# Patient Record
Sex: MALE | Race: WHITE | URBAN - METROPOLITAN AREA
[De-identification: names, ages, dates, MRNs, and addresses within clinical notes are randomized per-mention and may not be internally consistent; named-entity substitution may affect disease eponyms.]

---

## 2017-03-14 ENCOUNTER — OFFICE VISIT (OUTPATIENT)
Dept: FAMILY MEDICINE CLINIC | Age: 62
End: 2017-03-14

## 2017-03-14 ENCOUNTER — HOSPITAL ENCOUNTER (OUTPATIENT)
Dept: OTHER | Age: 62
Discharge: HOME | End: 2017-03-14
Attending: FAMILY MEDICINE | Admitting: FAMILY MEDICINE

## 2017-03-14 VITALS
BODY MASS INDEX: 29.92 KG/M2 | DIASTOLIC BLOOD PRESSURE: 70 MMHG | HEIGHT: 69 IN | OXYGEN SATURATION: 96 % | HEART RATE: 107 BPM | SYSTOLIC BLOOD PRESSURE: 120 MMHG | WEIGHT: 202 LBS

## 2017-03-14 DIAGNOSIS — Z72.0 TOBACCO ABUSE: ICD-10-CM

## 2017-03-14 DIAGNOSIS — I73.9 PVD (PERIPHERAL VASCULAR DISEASE) (HCC): ICD-10-CM

## 2017-03-14 DIAGNOSIS — K63.5 COLON POLYP: ICD-10-CM

## 2017-03-14 DIAGNOSIS — Z23 NEED FOR PROPHYLACTIC VACCINATION WITH STREPTOCOCCUS PNEUMONIAE (PNEUMOCOCCUS) AND INFLUENZA VACCINES: ICD-10-CM

## 2017-03-14 DIAGNOSIS — Z23 NEED FOR TDAP VACCINATION: ICD-10-CM

## 2017-03-14 DIAGNOSIS — I10 ESSENTIAL HYPERTENSION: ICD-10-CM

## 2017-03-14 DIAGNOSIS — Z00.00 PHYSICAL EXAM, ROUTINE: Primary | ICD-10-CM

## 2017-03-14 LAB
A/G RATIO: 1.6 (ref 1.1–2.2)
ALBUMIN SERPL-MCNC: 4.6 G/DL (ref 3.4–5)
ALP BLD-CCNC: 53 U/L (ref 40–129)
ALT SERPL-CCNC: 13 U/L (ref 10–40)
ANION GAP SERPL CALCULATED.3IONS-SCNC: 17 MMOL/L (ref 3–16)
AST SERPL-CCNC: 19 U/L (ref 15–37)
BILIRUB SERPL-MCNC: 0.7 MG/DL (ref 0–1)
BUN BLDV-MCNC: 16 MG/DL (ref 7–20)
CALCIUM SERPL-MCNC: 9.8 MG/DL (ref 8.3–10.6)
CHLORIDE BLD-SCNC: 95 MMOL/L (ref 99–110)
CHOLESTEROL, TOTAL: 153 MG/DL (ref 0–199)
CO2: 27 MMOL/L (ref 21–32)
CREAT SERPL-MCNC: 0.9 MG/DL (ref 0.8–1.3)
GFR AFRICAN AMERICAN: >60
GFR NON-AFRICAN AMERICAN: >60
GLOBULIN: 2.9 G/DL
GLUCOSE BLD-MCNC: 104 MG/DL (ref 70–99)
HDLC SERPL-MCNC: 66 MG/DL (ref 40–60)
LDL CHOLESTEROL CALCULATED: 62 MG/DL
POTASSIUM SERPL-SCNC: 5.2 MMOL/L (ref 3.5–5.1)
PROSTATE SPECIFIC ANTIGEN: 1.52 NG/ML (ref 0–4)
SODIUM BLD-SCNC: 139 MMOL/L (ref 136–145)
TOTAL PROTEIN: 7.5 G/DL (ref 6.4–8.2)
TRIGL SERPL-MCNC: 125 MG/DL (ref 0–150)
VLDLC SERPL CALC-MCNC: 25 MG/DL

## 2017-03-14 PROCEDURE — 90472 IMMUNIZATION ADMIN EACH ADD: CPT | Performed by: FAMILY MEDICINE

## 2017-03-14 PROCEDURE — 99396 PREV VISIT EST AGE 40-64: CPT | Performed by: FAMILY MEDICINE

## 2017-03-14 PROCEDURE — 90715 TDAP VACCINE 7 YRS/> IM: CPT | Performed by: FAMILY MEDICINE

## 2017-03-14 PROCEDURE — 90471 IMMUNIZATION ADMIN: CPT | Performed by: FAMILY MEDICINE

## 2017-03-14 PROCEDURE — 90732 PPSV23 VACC 2 YRS+ SUBQ/IM: CPT | Performed by: FAMILY MEDICINE

## 2017-03-14 RX ORDER — VARENICLINE TARTRATE 1 MG/1
1 TABLET, FILM COATED ORAL 2 TIMES DAILY
Qty: 60 TABLET | Refills: 4 | Status: SHIPPED | OUTPATIENT
Start: 2017-03-14 | End: 2018-03-19

## 2017-03-14 RX ORDER — HYDROCHLOROTHIAZIDE 25 MG/1
TABLET ORAL
Qty: 30 TABLET | Refills: 11 | Status: SHIPPED | OUTPATIENT
Start: 2017-03-14 | End: 2018-03-13 | Stop reason: SDUPTHER

## 2017-03-14 RX ORDER — ATORVASTATIN CALCIUM 20 MG/1
20 TABLET, FILM COATED ORAL DAILY
Qty: 30 TABLET | Refills: 11 | Status: SHIPPED | OUTPATIENT
Start: 2017-03-14 | End: 2018-03-13 | Stop reason: SDUPTHER

## 2017-03-14 RX ORDER — VARENICLINE TARTRATE 25 MG
KIT ORAL
Qty: 1 EACH | Refills: 0 | Status: SHIPPED | OUTPATIENT
Start: 2017-03-14 | End: 2018-03-19

## 2017-03-14 RX ORDER — LOSARTAN POTASSIUM 100 MG/1
TABLET ORAL
Qty: 30 TABLET | Refills: 11 | Status: SHIPPED | OUTPATIENT
Start: 2017-03-14 | End: 2018-03-13 | Stop reason: SDUPTHER

## 2017-04-07 ENCOUNTER — HOSPITAL ENCOUNTER (OUTPATIENT)
Dept: ENDOSCOPY | Age: 62
Discharge: HOME | End: 2017-04-07
Attending: INTERNAL MEDICINE | Admitting: INTERNAL MEDICINE

## 2018-02-14 DIAGNOSIS — I10 ESSENTIAL HYPERTENSION: ICD-10-CM

## 2018-02-14 DIAGNOSIS — I73.9 PVD (PERIPHERAL VASCULAR DISEASE) (HCC): ICD-10-CM

## 2018-02-14 RX ORDER — LOSARTAN POTASSIUM 100 MG/1
TABLET ORAL
Qty: 30 TABLET | Refills: 11 | OUTPATIENT
Start: 2018-02-14

## 2018-02-14 RX ORDER — ATORVASTATIN CALCIUM 20 MG/1
20 TABLET, FILM COATED ORAL DAILY
Qty: 30 TABLET | Refills: 11 | OUTPATIENT
Start: 2018-02-14

## 2018-02-14 RX ORDER — HYDROCHLOROTHIAZIDE 25 MG/1
TABLET ORAL
Qty: 30 TABLET | Refills: 11 | OUTPATIENT
Start: 2018-02-14

## 2018-03-13 DIAGNOSIS — I10 ESSENTIAL HYPERTENSION: ICD-10-CM

## 2018-03-13 DIAGNOSIS — I73.9 PVD (PERIPHERAL VASCULAR DISEASE) (HCC): ICD-10-CM

## 2018-03-13 RX ORDER — ATORVASTATIN CALCIUM 20 MG/1
20 TABLET, FILM COATED ORAL DAILY
Qty: 30 TABLET | Refills: 3 | Status: SHIPPED | OUTPATIENT
Start: 2018-03-13 | End: 2018-03-19 | Stop reason: SDUPTHER

## 2018-03-13 RX ORDER — HYDROCHLOROTHIAZIDE 25 MG/1
TABLET ORAL
Qty: 30 TABLET | Refills: 3 | Status: SHIPPED | OUTPATIENT
Start: 2018-03-13 | End: 2018-03-19 | Stop reason: SDUPTHER

## 2018-03-13 RX ORDER — LOSARTAN POTASSIUM 100 MG/1
TABLET ORAL
Qty: 30 TABLET | Refills: 3 | Status: SHIPPED | OUTPATIENT
Start: 2018-03-13 | End: 2018-03-19 | Stop reason: SDUPTHER

## 2018-03-19 ENCOUNTER — HOSPITAL ENCOUNTER (OUTPATIENT)
Dept: OTHER | Age: 63
Discharge: HOME | End: 2018-03-19
Attending: FAMILY MEDICINE | Admitting: FAMILY MEDICINE

## 2018-03-19 ENCOUNTER — PROCEDURE VISIT (OUTPATIENT)
Dept: FAMILY MEDICINE CLINIC | Age: 63
End: 2018-03-19

## 2018-03-19 VITALS
DIASTOLIC BLOOD PRESSURE: 72 MMHG | OXYGEN SATURATION: 94 % | HEIGHT: 70 IN | WEIGHT: 203.6 LBS | SYSTOLIC BLOOD PRESSURE: 114 MMHG | BODY MASS INDEX: 29.15 KG/M2 | HEART RATE: 96 BPM

## 2018-03-19 DIAGNOSIS — I10 ESSENTIAL HYPERTENSION: ICD-10-CM

## 2018-03-19 DIAGNOSIS — Z72.0 TOBACCO USE: Primary | ICD-10-CM

## 2018-03-19 DIAGNOSIS — Z12.5 SCREENING FOR PROSTATE CANCER: ICD-10-CM

## 2018-03-19 DIAGNOSIS — I73.9 PVD (PERIPHERAL VASCULAR DISEASE) (HCC): ICD-10-CM

## 2018-03-19 DIAGNOSIS — R73.01 ELEVATED FASTING BLOOD SUGAR: ICD-10-CM

## 2018-03-19 DIAGNOSIS — K63.5 POLYP OF COLON, UNSPECIFIED PART OF COLON, UNSPECIFIED TYPE: ICD-10-CM

## 2018-03-19 DIAGNOSIS — K21.9 GASTROESOPHAGEAL REFLUX DISEASE WITHOUT ESOPHAGITIS: ICD-10-CM

## 2018-03-19 LAB
A/G RATIO: 1.7 (ref 1.1–2.2)
ALBUMIN SERPL-MCNC: 4.3 G/DL (ref 3.4–5)
ALP BLD-CCNC: 50 U/L (ref 40–129)
ALT SERPL-CCNC: 15 U/L (ref 10–40)
ANION GAP SERPL CALCULATED.3IONS-SCNC: 20 MMOL/L (ref 3–16)
AST SERPL-CCNC: 16 U/L (ref 15–37)
BILIRUB SERPL-MCNC: 0.5 MG/DL (ref 0–1)
BUN BLDV-MCNC: 16 MG/DL (ref 7–20)
CALCIUM SERPL-MCNC: 9 MG/DL (ref 8.3–10.6)
CHLORIDE BLD-SCNC: 102 MMOL/L (ref 99–110)
CHOLESTEROL, FASTING: 130 MG/DL (ref 0–199)
CO2: 26 MMOL/L (ref 21–32)
CREAT SERPL-MCNC: 0.9 MG/DL (ref 0.8–1.3)
ESTIMATED AVERAGE GLUCOSE: 108.3 MG/DL
GFR AFRICAN AMERICAN: >60
GFR NON-AFRICAN AMERICAN: >60
GLOBULIN: 2.5 G/DL
GLUCOSE BLD-MCNC: 105 MG/DL (ref 70–99)
HBA1C MFR BLD: 5.4 %
HDLC SERPL-MCNC: 63 MG/DL (ref 40–60)
LDL CHOLESTEROL CALCULATED: 48 MG/DL
POTASSIUM SERPL-SCNC: 5.1 MMOL/L (ref 3.5–5.1)
PROSTATE SPECIFIC ANTIGEN: 1.12 NG/ML (ref 0–4)
SODIUM BLD-SCNC: 148 MMOL/L (ref 136–145)
TOTAL PROTEIN: 6.8 G/DL (ref 6.4–8.2)
TRIGLYCERIDE, FASTING: 97 MG/DL (ref 0–150)
VLDLC SERPL CALC-MCNC: 19 MG/DL

## 2018-03-19 PROCEDURE — 99396 PREV VISIT EST AGE 40-64: CPT | Performed by: FAMILY MEDICINE

## 2018-03-19 RX ORDER — ATORVASTATIN CALCIUM 20 MG/1
20 TABLET, FILM COATED ORAL DAILY
Qty: 90 TABLET | Refills: 3 | Status: SHIPPED | OUTPATIENT
Start: 2018-03-19 | End: 2019-04-09 | Stop reason: SDUPTHER

## 2018-03-19 RX ORDER — BUPROPION HYDROCHLORIDE 150 MG/1
150 TABLET, EXTENDED RELEASE ORAL 2 TIMES DAILY
Qty: 180 TABLET | Refills: 1 | Status: SHIPPED | OUTPATIENT
Start: 2018-03-19 | End: 2019-04-09 | Stop reason: ALTCHOICE

## 2018-03-19 RX ORDER — HYDROCHLOROTHIAZIDE 25 MG/1
TABLET ORAL
Qty: 90 TABLET | Refills: 3 | Status: SHIPPED | OUTPATIENT
Start: 2018-03-19 | End: 2019-04-09 | Stop reason: SDUPTHER

## 2018-03-19 RX ORDER — LOSARTAN POTASSIUM 100 MG/1
TABLET ORAL
Qty: 90 TABLET | Refills: 3 | Status: SHIPPED | OUTPATIENT
Start: 2018-03-19 | End: 2019-04-09 | Stop reason: SDUPTHER

## 2018-03-19 ASSESSMENT — PATIENT HEALTH QUESTIONNAIRE - PHQ9
1. LITTLE INTEREST OR PLEASURE IN DOING THINGS: 0
2. FEELING DOWN, DEPRESSED OR HOPELESS: 0
SUM OF ALL RESPONSES TO PHQ QUESTIONS 1-9: 0
SUM OF ALL RESPONSES TO PHQ9 QUESTIONS 1 & 2: 0

## 2018-03-19 NOTE — PROGRESS NOTES
Administered    Influenza Virus Vaccine 12/01/2013    Pneumococcal Polysaccharide ([id number]) 03/14/2017    Tdap (Boostrix, Adacel) 03/14/2017    Zoster Live (Zostavax) 02/13/2015       Allergies   Allergen Reactions    Chantix [Varenicline]      Pananoia     Outpatient Prescriptions Marked as Taking for the 3/19/18 encounter (Procedure visit) with Marguerite Sharif MD   Medication Sig Dispense Refill    losartan (COZAAR) 100 MG tablet TAKE 1 TABLET DAILY 30 tablet 3    atorvastatin (LIPITOR) 20 MG tablet TAKE 1 TABLET BY MOUTH DAILY 30 tablet 3    hydrochlorothiazide (HYDRODIURIL) 25 MG tablet TAKE 1 TABLET BY MOUTH DAILY. 30 tablet 3    esomeprazole (NEXIUM) 20 MG capsule Take 1 capsule by mouth daily 30 capsule 11    aspirin 81 MG tablet Take 81 mg by mouth daily. Past Medical History:   Diagnosis Date    Hypertension      Past Surgical History:   Procedure Laterality Date    BACK SURGERY  2013    Dr. Cliff Kee  posterior right ear 1980   [facility] Bilateral 2013    Dr. Jenny Jay; Iliac stents    TONSILLECTOMY       Family History   Problem Relation Age of Onset   [facility] Breast Cancer Mother     Prostate Cancer Father 70     Social History     Social History    Marital status:      Spouse name: N/A    Number of children: N/A    Years of education: N/A     Occupational History    Not on file. Social History Main Topics    Smoking status: Current Every Day Smoker     Packs/day: 0.75     Years: 40.00    Smokeless tobacco: Current User     Types: Chew    Alcohol use 8.4 oz/week     14 Shots of liquor per week      Comment: 2 drinks a day    Drug use: No    Sexual activity: Not on file     Other Topics Concern    Not on file     Social History Narrative    No narrative on file       Review of Systems:  A comprehensive review of systems was negative except for what was noted in the HPI.     Physical Exam:   Vitals:    03/19/18 0919 03/19/18 1001 BP: 138/78 114/72   Site: Left Arm    Position: Sitting    Cuff Size: Large Adult    Pulse: 96    SpO2: 94%    Weight: 203 lb 9.6 oz (92.4 kg)    Height: 5' 10\" (1.778 m)      Body mass index is 29.21 kg/m². Constitutional: He is oriented to person, place, and time. He appears well-developed and well-nourished. No distress. HEENT:   Head: Normocephalic and atraumatic. Right Ear: Tympanic membrane, external ear and ear canal normal.   Left Ear: Tympanic membrane, external ear and ear canal normal.   Nose: Nose normal.   Mouth/Throat: Oropharynx is clear and moist and mucous membranes are normal. No oropharyngeal exudate or posterior oropharyngeal erythema. He has no cervical adenopathy. Eyes: Conjunctivae and extraocular motions are normal. Pupils are equal, round, and reactive to light. Neck: Full passive range of motion without pain. Neck supple. No JVD present. Carotid bruit is not present. No mass and no thyromegaly present. Cardiovascular: Normal rate, regular rhythm, normal heart sounds and intact distal pulses. Exam reveals no gallop and no friction rub. No murmur heard. Pulmonary/Chest: Effort normal and breath sounds normal. No respiratory distress. He has no wheezes, rhonchi or rales. Abdominal: Soft, non-tender. Bowel sounds and aorta are normal. There is no organomegaly, mass or bruit. Musculoskeletal: Normal range of motion, no synovitis. He exhibits no edema. Neurological: He is alert and oriented to person, place, and time. He has normal reflexes. No cranial nerve deficit. Coordination normal.   Skin: Skin is warm, dry and intact. No suspicious lesions are noted. Psychiatric: He has a normal mood and affect.  His speech is normal and behavior is normal. Judgment, cognition and memory are normal.     Assessment/Plan:    Lisa Chisholm was seen today for annual exam.    Diagnoses and all orders for this visit:    Tobacco use    PVD [facility]peripheral vascular disease) ([facility]  -     atorvastatin

## 2019-04-09 ENCOUNTER — OFFICE VISIT (OUTPATIENT)
Dept: FAMILY MEDICINE CLINIC | Age: 64
End: 2019-04-09
Payer: COMMERCIAL

## 2019-04-09 VITALS
BODY MASS INDEX: 28.98 KG/M2 | HEIGHT: 70 IN | DIASTOLIC BLOOD PRESSURE: 62 MMHG | HEART RATE: 92 BPM | OXYGEN SATURATION: 100 % | SYSTOLIC BLOOD PRESSURE: 108 MMHG | WEIGHT: 202.4 LBS

## 2019-04-09 DIAGNOSIS — R73.9 HYPERGLYCEMIA: ICD-10-CM

## 2019-04-09 DIAGNOSIS — I10 ESSENTIAL HYPERTENSION: ICD-10-CM

## 2019-04-09 DIAGNOSIS — Z23 NEED FOR PROPHYLACTIC VACCINATION AND INOCULATION AGAINST VARICELLA: ICD-10-CM

## 2019-04-09 DIAGNOSIS — Z12.5 SCREENING FOR PROSTATE CANCER: ICD-10-CM

## 2019-04-09 DIAGNOSIS — Z00.00 PHYSICAL EXAM, ROUTINE: Primary | ICD-10-CM

## 2019-04-09 DIAGNOSIS — Z11.4 SCREENING FOR HUMAN IMMUNODEFICIENCY VIRUS WITHOUT PRESENCE OF RISK FACTORS: ICD-10-CM

## 2019-04-09 DIAGNOSIS — H61.21 IMPACTED CERUMEN OF RIGHT EAR: ICD-10-CM

## 2019-04-09 DIAGNOSIS — Z11.59 ENCOUNTER FOR HEPATITIS C SCREENING TEST FOR LOW RISK PATIENT: ICD-10-CM

## 2019-04-09 DIAGNOSIS — I73.9 PVD (PERIPHERAL VASCULAR DISEASE) (HCC): ICD-10-CM

## 2019-04-09 PROCEDURE — 99396 PREV VISIT EST AGE 40-64: CPT | Performed by: FAMILY MEDICINE

## 2019-04-09 RX ORDER — BUPROPION HYDROCHLORIDE 150 MG/1
150 TABLET, EXTENDED RELEASE ORAL 2 TIMES DAILY
Qty: 180 TABLET | Refills: 3 | Status: CANCELLED | OUTPATIENT
Start: 2019-04-09

## 2019-04-09 RX ORDER — ATORVASTATIN CALCIUM 20 MG/1
20 TABLET, FILM COATED ORAL DAILY
Qty: 90 TABLET | Refills: 3 | Status: SHIPPED | OUTPATIENT
Start: 2019-04-09 | End: 2020-03-04 | Stop reason: SDUPTHER

## 2019-04-09 RX ORDER — HYDROCHLOROTHIAZIDE 25 MG/1
TABLET ORAL
Qty: 90 TABLET | Refills: 3 | Status: SHIPPED | OUTPATIENT
Start: 2019-04-09 | End: 2020-03-04 | Stop reason: SDUPTHER

## 2019-04-09 RX ORDER — LOSARTAN POTASSIUM 100 MG/1
TABLET ORAL
Qty: 90 TABLET | Refills: 3 | Status: SHIPPED | OUTPATIENT
Start: 2019-04-09 | End: 2020-03-04 | Stop reason: SDUPTHER

## 2019-04-09 ASSESSMENT — PATIENT HEALTH QUESTIONNAIRE - PHQ9
SUM OF ALL RESPONSES TO PHQ9 QUESTIONS 1 & 2: 0
2. FEELING DOWN, DEPRESSED OR HOPELESS: 0
1. LITTLE INTEREST OR PLEASURE IN DOING THINGS: 0
SUM OF ALL RESPONSES TO PHQ QUESTIONS 1-9: 0
SUM OF ALL RESPONSES TO PHQ QUESTIONS 1-9: 0

## 2019-04-09 NOTE — PROGRESS NOTES
History and Physical      Segundo Lagos  YOB: 1955    Date of Service:  4/9/2019    Chief Complaint:   Segundo Lagos is a 58 y.o. male who presents for complete physical examination. HPI:     Feeling well overall. Patient has a history of hypertension. He has been compliant with hydrochlorothiazide and Cozaar. He has not had any chest pain, shortness breath, headache, or palpitations. He is sleeping well at night. History of peripheral vascular disease status post stenting. Denies leg pain. Compliant with antihypertensives, Lipitor, and aspirin. History of hyperglycemia. A1c's have been fine in the past.    Quit smoking in October! No abdominal pain, N/V/D, or blood in stool.     Wt Readings from Last 3 Encounters:   04/09/19 202 lb 6.4 oz (91.8 kg)   03/19/18 203 lb 9.6 oz (92.4 kg)   03/14/17 202 lb (91.6 kg)     BP Readings from Last 3 Encounters:   04/09/19 108/62   03/19/18 114/72   03/14/17 120/70       Patient Active Problem List   Diagnosis    Tobacco abuse    Radiculopathy, lumbar region    Hypertension    Peripheral artery disease ([facility]    PVD (peripheral vascular disease) ([facility]    Colon polyp       Preventive Care:  Health Maintenance   Topic Date Due    Hepatitis C screen  1955    HIV screen  08/11/1969    Low dose CT lung screening  08/11/2009    Shingles Vaccine (2 of 3) 04/10/2015    Potassium monitoring  03/19/2019    Creatinine monitoring  03/19/2019    Flu vaccine (Season Ended) 09/01/2019    Diabetes screen  03/19/2021    Colon cancer screen colonoscopy  04/07/2022    Lipid screen  03/19/2023    DTaP/Tdap/Td vaccine (2 - Td) 03/14/2027    Pneumococcal 0-64 years Vaccine  Completed        Lipid panel:    Lab Results   Component Value Date    CHOL 153 03/14/2017    TRIG 125 03/14/2017    HDL 63 (H) 03/19/2018    LDLCALC 48 03/19/2018       Immunization History   Administered Date(s) Administered    Influenza Virus Vaccine 12/01/2013    Pneumococcal Polysaccharide ([id number]) 2017    Tdap (Boostrix, Adacel) 2017    Zoster Live (Zostavax) 2015       Allergies   Allergen Reactions    Chantix [Varenicline]      Pananoia     Outpatient Medications Marked as Taking for the 19 encounter (Office Visit) with Teo Suarez MD   Medication Sig Dispense Refill    atorvastatin (LIPITOR) 20 MG tablet Take 1 tablet by mouth daily 90 tablet 3    esomeprazole (NEXIUM) 20 MG delayed release capsule Take 1 capsule by mouth daily 90 capsule 3    hydrochlorothiazide (HYDRODIURIL) 25 MG tablet TAKE 1 TABLET BY MOUTH DAILY. 90 tablet 3    losartan (COZAAR) 100 MG tablet TAKE 1 TABLET DAILY 90 tablet 3    aspirin 81 MG tablet Take 81 mg by mouth daily. Past Medical History:   Diagnosis Date    Hypertension      Past Surgical History:   Procedure Laterality Date    BACK SURGERY      Dr. Liz Hands  posterior right ear    [facility] Bilateral     Dr. Elvira Hernandez; Iliac stents    TONSILLECTOMY       Family History   Problem Relation Age of Onset   [facility] Breast [facility] Mother     Prostate Cancer Father 70     Social History     Socioeconomic History    Marital status:      Spouse name: Not on file    Number of children: Not on file    Years of education: Not on file    Highest education level: Not on file   Occupational History    Not on file   Social Needs    Financial resource strain: Not on file    Food insecurity:     Worry: Not on file     Inability: Not on file    Transportation needs:     Medical: Not on file     Non-medical: Not on file   Tobacco Use    Smoking status: Former Smoker     Packs/day: 0.75     Years: 45.00     Pack years: 33.75     Last attempt to quit: 2018     Years since quittin.6    Smokeless tobacco: Current User     Types: Chew   Substance and Sexual Activity    Alcohol use:  Yes     Alcohol/week: 8.4 oz     Types: 14 Shots of liquor per week heart sounds and intact distal pulses. Exam reveals no gallop and no friction rub. No murmur heard. Pulmonary/Chest: Effort normal and breath sounds normal. No respiratory distress. He has no wheezes, rhonchi or rales. Abdominal: Soft, non-tender. Bowel sounds and aorta are normal. There is no organomegaly, mass or bruit. Musculoskeletal: Normal range of motion, no synovitis. He exhibits no edema. Neurological: He is alert and oriented to person, place, and time. He has normal reflexes. No cranial nerve deficit. Coordination normal.   Skin: Skin is warm, dry and intact. No suspicious lesions are noted. Psychiatric: He has a normal mood and affect. His speech is normal and behavior is normal. Judgment, cognition and memory are normal.     Assessment/Plan:    Moise Hinojosa was seen today for annual exam.    Diagnoses and all orders for this visit:    Physical exam, routine    Essential hypertension  -     losartan (COZAAR) 100 MG tablet; TAKE 1 TABLET DAILY  -     hydrochlorothiazide (HYDRODIURIL) 25 MG tablet; TAKE 1 TABLET BY MOUTH DAILY. -     COMPREHENSIVE METABOLIC PANEL; Future  Blood pressure is well controlled. Continue current medications. Check CMP. Encounter for hepatitis C screening test for low risk patient  -     HEPATITIS C ANTIBODY; Future    Screening for human immunodeficiency virus without presence of risk factors  -     HIV-1 AND HIV-2 ANTIBODIES; Future    PVD (peripheral vascular disease) (HCC)  -     atorvastatin (LIPITOR) 20 MG tablet; Take 1 tablet by mouth daily  -     Lipid, Fasting; Future  Continue statin, blood pressure control, and aspirin. Need for prophylactic vaccination and inoculation against varicella  -     zoster recombinant adjuvanted vaccine [facility]) 50 MCG/0.5ML SUSR injection; Inject 0.5 mLs into the muscle once for 1 dose 50 MCG IM then repeat 2-6 months.     Hyperglycemia  -     HEMOGLOBIN A1C; Future    Screening for prostate cancer  -     Psa screening; Future    Impacted cerumen of right ear  Right ear irrigated today successfully. UTD on colonoscopy through 2022.

## 2019-04-10 LAB
A/G RATIO: 1.7 (CALC) (ref 1–2.5)
ALBUMIN SERPL-MCNC: 4.5 G/DL (ref 3.6–5.1)
ALP BLD-CCNC: 55 U/L (ref 40–115)
ALT SERPL-CCNC: 12 U/L (ref 9–46)
AST SERPL-CCNC: 18 U/L (ref 10–35)
BILIRUB SERPL-MCNC: 0.7 MG/DL (ref 0.2–1.2)
BUN / CREAT RATIO: ABNORMAL (CALC) (ref 6–22)
BUN BLDV-MCNC: 25 MG/DL (ref 7–25)
CALCIUM SERPL-MCNC: 9.2 MG/DL (ref 8.6–10.3)
CHLORIDE BLD-SCNC: 98 MMOL/L (ref 98–110)
CHOLESTEROL, TOTAL: 144 MG/DL
CHOLESTEROL/HDL RATIO: 2.1 (CALC)
CHOLESTEROL: 74 MG/DL (CALC)
CO2: 26 MMOL/L (ref 20–32)
COMMENT: NORMAL
CREAT SERPL-MCNC: 1.25 MG/DL (ref 0.7–1.25)
GFR AFRICAN AMERICAN: 70 ML/MIN/1.73M2
GFR, ESTIMATED: 60 ML/MIN/1.73M2
GLOBULIN: 2.6 G/DL (CALC) (ref 1.9–3.7)
GLUCOSE BLD-MCNC: 109 MG/DL (ref 65–99)
HBA1C MFR BLD: 5.7 % OF TOTAL HGB
HDLC SERPL-MCNC: 70 MG/DL
HEPATITIS C ANTIBODY: NORMAL
HEPATITIS C VIRUS AB SIGNAL/CU: 0.01
HIV 1+2 AB+HIV1P24 AG, EIA: NORMAL
LDL CHOLESTEROL CALCULATED: 55 MG/DL (CALC)
POTASSIUM SERPL-SCNC: 4.1 MMOL/L (ref 3.5–5.3)
PROSTATE SPECIFIC ANTIGEN: 1.1 NG/ML
SODIUM BLD-SCNC: 137 MMOL/L (ref 135–146)
TOTAL PROTEIN: 7.1 G/DL (ref 6.1–8.1)
TRIGL SERPL-MCNC: 103 MG/DL

## 2019-07-11 DIAGNOSIS — K21.9 GASTROESOPHAGEAL REFLUX DISEASE WITHOUT ESOPHAGITIS: ICD-10-CM

## 2020-03-04 RX ORDER — LOSARTAN POTASSIUM 100 MG/1
TABLET ORAL
Qty: 90 TABLET | Refills: 0 | Status: SHIPPED | OUTPATIENT
Start: 2020-03-04 | End: 2022-08-18 | Stop reason: SDUPTHER

## 2020-03-04 RX ORDER — ATORVASTATIN CALCIUM 20 MG/1
20 TABLET, FILM COATED ORAL DAILY
Qty: 90 TABLET | Refills: 0 | Status: SHIPPED | OUTPATIENT
Start: 2020-03-04 | End: 2022-08-18 | Stop reason: SDUPTHER

## 2020-03-04 RX ORDER — HYDROCHLOROTHIAZIDE 25 MG/1
TABLET ORAL
Qty: 90 TABLET | Refills: 0 | Status: SHIPPED | OUTPATIENT
Start: 2020-03-04 | End: 2022-08-18 | Stop reason: SDUPTHER

## 2020-03-04 NOTE — TELEPHONE ENCOUNTER
Medication:   Requested Prescriptions     Pending Prescriptions Disp Refills    losartan (COZAAR) 100 MG tablet 90 tablet 3     Sig: TAKE 1 TABLET DAILY    hydroCHLOROthiazide (HYDRODIURIL) 25 MG tablet 90 tablet 3     Sig: TAKE 1 TABLET BY MOUTH DAILY.  atorvastatin (LIPITOR) 20 MG tablet 90 tablet 3     Sig: Take 1 tablet by mouth daily     Last Filled:  4/9/19  Patient Phone Number: [phone] (home) [phone] (work)    Last appt: 4/9/2019   Next appt: Visit date not found    Last Lipid:   Lab Results   Component Value Date    CHOL 144 04/09/2019    CHOL 74 04/09/2019    TRIG 103 04/09/2019    HDL 70 04/09/2019    LDLCALC 55 04/09/2019       Last OARRS: No flowsheet data found.     [facility] [facility] [address] [phone] (Phone)  [phone] (Fax)

## 2022-08-18 ENCOUNTER — OFFICE VISIT (OUTPATIENT)
Dept: INTERNAL MEDICINE CLINIC | Age: 67
End: 2022-08-18
Payer: COMMERCIAL

## 2022-08-18 VITALS
HEIGHT: 70 IN | HEART RATE: 79 BPM | DIASTOLIC BLOOD PRESSURE: 69 MMHG | WEIGHT: 180 LBS | SYSTOLIC BLOOD PRESSURE: 138 MMHG | BODY MASS INDEX: 25.77 KG/M2 | OXYGEN SATURATION: 99 %

## 2022-08-18 DIAGNOSIS — I10 PRIMARY HYPERTENSION: ICD-10-CM

## 2022-08-18 DIAGNOSIS — I10 PRIMARY HYPERTENSION: Primary | ICD-10-CM

## 2022-08-18 DIAGNOSIS — Z12.5 PROSTATE CANCER SCREENING: ICD-10-CM

## 2022-08-18 DIAGNOSIS — E78.2 MIXED HYPERLIPIDEMIA: ICD-10-CM

## 2022-08-18 DIAGNOSIS — Z87.891 PERSONAL HISTORY OF TOBACCO USE: ICD-10-CM

## 2022-08-18 DIAGNOSIS — R73.01 IMPAIRED FASTING BLOOD SUGAR: ICD-10-CM

## 2022-08-18 DIAGNOSIS — I73.9 PVD (PERIPHERAL VASCULAR DISEASE) (HCC): ICD-10-CM

## 2022-08-18 DIAGNOSIS — Z76.89 ENCOUNTER TO ESTABLISH CARE WITH NEW DOCTOR: ICD-10-CM

## 2022-08-18 DIAGNOSIS — K21.9 GASTROESOPHAGEAL REFLUX DISEASE WITHOUT ESOPHAGITIS: ICD-10-CM

## 2022-08-18 PROBLEM — E55.9 VITAMIN D DEFICIENCY: Status: ACTIVE | Noted: 2021-07-12

## 2022-08-18 LAB
A/G RATIO: 1.8 (ref 1.1–2.2)
ALBUMIN SERPL-MCNC: 4.3 G/DL (ref 3.4–5)
ALP BLD-CCNC: 53 U/L (ref 40–129)
ALT SERPL-CCNC: 12 U/L (ref 10–40)
ANION GAP SERPL CALCULATED.3IONS-SCNC: 11 MMOL/L (ref 3–16)
AST SERPL-CCNC: 23 U/L (ref 15–37)
BILIRUB SERPL-MCNC: 0.4 MG/DL (ref 0–1)
BUN BLDV-MCNC: 26 MG/DL (ref 7–20)
CALCIUM SERPL-MCNC: 9.1 MG/DL (ref 8.3–10.6)
CHLORIDE BLD-SCNC: 94 MMOL/L (ref 99–110)
CHOLESTEROL, TOTAL: 150 MG/DL (ref 0–199)
CO2: 27 MMOL/L (ref 21–32)
CREAT SERPL-MCNC: 1.1 MG/DL (ref 0.8–1.3)
GFR AFRICAN AMERICAN: >60
GFR NON-AFRICAN AMERICAN: >60
GLUCOSE BLD-MCNC: 104 MG/DL (ref 70–99)
HCT VFR BLD CALC: 37.1 % (ref 40.5–52.5)
HDLC SERPL-MCNC: 74 MG/DL (ref 40–60)
HEMOGLOBIN: 12.6 G/DL (ref 13.5–17.5)
LDL CHOLESTEROL CALCULATED: 60 MG/DL
MCH RBC QN AUTO: 34.8 PG (ref 26–34)
MCHC RBC AUTO-ENTMCNC: 34.1 G/DL (ref 31–36)
MCV RBC AUTO: 102 FL (ref 80–100)
PDW BLD-RTO: 13 % (ref 12.4–15.4)
PLATELET # BLD: 249 K/UL (ref 135–450)
PMV BLD AUTO: 9.3 FL (ref 5–10.5)
POTASSIUM SERPL-SCNC: 4.8 MMOL/L (ref 3.5–5.1)
PROSTATE SPECIFIC ANTIGEN: 1.92 NG/ML (ref 0–4)
RBC # BLD: 3.63 M/UL (ref 4.2–5.9)
SODIUM BLD-SCNC: 132 MMOL/L (ref 136–145)
TOTAL PROTEIN: 6.7 G/DL (ref 6.4–8.2)
TRIGL SERPL-MCNC: 80 MG/DL (ref 0–150)
TSH REFLEX FT4: 0.64 UIU/ML (ref 0.27–4.2)
VLDLC SERPL CALC-MCNC: 16 MG/DL
WBC # BLD: 6.8 K/UL (ref 4–11)

## 2022-08-18 PROCEDURE — G0296 VISIT TO DETERM LDCT ELIG: HCPCS | Performed by: INTERNAL MEDICINE

## 2022-08-18 PROCEDURE — 1123F ACP DISCUSS/DSCN MKR DOCD: CPT | Performed by: INTERNAL MEDICINE

## 2022-08-18 PROCEDURE — 99204 OFFICE O/P NEW MOD 45 MIN: CPT | Performed by: INTERNAL MEDICINE

## 2022-08-18 RX ORDER — ATORVASTATIN CALCIUM 20 MG/1
20 TABLET, FILM COATED ORAL DAILY
Qty: 90 TABLET | Refills: 1 | Status: SHIPPED | OUTPATIENT
Start: 2022-08-18 | End: 2022-08-22 | Stop reason: SDUPTHER

## 2022-08-18 RX ORDER — HYDROCHLOROTHIAZIDE 25 MG/1
TABLET ORAL
Qty: 90 TABLET | Refills: 1 | Status: SHIPPED | OUTPATIENT
Start: 2022-08-18 | End: 2022-08-22 | Stop reason: SDUPTHER

## 2022-08-18 RX ORDER — OMEPRAZOLE 20 MG/1
20 CAPSULE, DELAYED RELEASE ORAL
Qty: 90 CAPSULE | Refills: 1 | Status: SHIPPED | OUTPATIENT
Start: 2022-08-18 | End: 2022-08-22 | Stop reason: SDUPTHER

## 2022-08-18 RX ORDER — LOSARTAN POTASSIUM 50 MG/1
TABLET ORAL
Qty: 90 TABLET | Refills: 1 | Status: SHIPPED | OUTPATIENT
Start: 2022-08-18 | End: 2022-08-22 | Stop reason: SDUPTHER

## 2022-08-18 SDOH — ECONOMIC STABILITY: FOOD INSECURITY: WITHIN THE PAST 12 MONTHS, THE FOOD YOU BOUGHT JUST DIDN'T LAST AND YOU DIDN'T HAVE MONEY TO GET MORE.: NEVER TRUE

## 2022-08-18 SDOH — ECONOMIC STABILITY: FOOD INSECURITY: WITHIN THE PAST 12 MONTHS, YOU WORRIED THAT YOUR FOOD WOULD RUN OUT BEFORE YOU GOT MONEY TO BUY MORE.: NEVER TRUE

## 2022-08-18 ASSESSMENT — ENCOUNTER SYMPTOMS
SORE THROAT: 0
BACK PAIN: 0
ABDOMINAL PAIN: 0
COUGH: 0
WHEEZING: 0
COLOR CHANGE: 0
CHEST TIGHTNESS: 0
NAUSEA: 0
VOMITING: 0
CONSTIPATION: 0
SHORTNESS OF BREATH: 0

## 2022-08-18 ASSESSMENT — PATIENT HEALTH QUESTIONNAIRE - PHQ9
SUM OF ALL RESPONSES TO PHQ QUESTIONS 1-9: 0
SUM OF ALL RESPONSES TO PHQ9 QUESTIONS 1 & 2: 0
2. FEELING DOWN, DEPRESSED OR HOPELESS: 0
SUM OF ALL RESPONSES TO PHQ QUESTIONS 1-9: 0
1. LITTLE INTEREST OR PLEASURE IN DOING THINGS: 0

## 2022-08-18 ASSESSMENT — SOCIAL DETERMINANTS OF HEALTH (SDOH): HOW HARD IS IT FOR YOU TO PAY FOR THE VERY BASICS LIKE FOOD, HOUSING, MEDICAL CARE, AND HEATING?: NOT HARD AT ALL

## 2022-08-18 NOTE — ASSESSMENT & PLAN NOTE
Counseled regarding need to discontinue tobacco dip use, explained increased risk for mouth and oral cavity cancers, gum and gingival disease among other side effects of tobacco.  Discussed with patient recommendations for CT scan of the lung screen

## 2022-08-18 NOTE — ASSESSMENT & PLAN NOTE
Symptoms stable and well-controlled on current dose of omeprazole 20 mg, he used to be on Nexium that was no longer covered by insurance.   Force recommendations for antireflux diet, tobacco use cessation and GERD lifestyle modification changes

## 2022-08-18 NOTE — ASSESSMENT & PLAN NOTE
Blood sugar readings were borderline in the past, reinforced recommendations for healthy low-carb diet regular exercise and keeping BMI under 25

## 2022-08-18 NOTE — PROGRESS NOTES
ASSESSMENT/PLAN:  1. Primary hypertension  Assessment & Plan:   Blood pressure stable and well-controlled on current combination of losartan with hydrochlorothiazide, will change dose from 100-50 since patient has been taking half of the tablet, update labs, reinforced recommendations for salt restriction, smoking cessation, active lifestyle and low-salt diet  Orders:  -     CBC; Future  -     Comprehensive Metabolic Panel; Future  -     hydroCHLOROthiazide (HYDRODIURIL) 25 MG tablet; TAKE 1 TABLET BY MOUTH DAILY. , Disp-90 tablet, R-1Patient must establish with a new provider for future refills. Normal  -     losartan (COZAAR) 50 MG tablet; TAKE 1 TABLET DAILY, Disp-90 tablet, R-1Patient must establish with a new provider for future refills. Normal  2. PVD (peripheral vascular disease) ([address]  Assessment & Plan:   Stable, continue low-dose aspirin and statin therapy he no longer smokes cigarettes however still uses tobacco dip  Orders:  -     atorvastatin (LIPITOR) 20 MG tablet; Take 1 tablet by mouth daily, Disp-90 tablet, R-1Patient must establish with a new provider for future refills. Normal  3. Mixed hyperlipidemia  Assessment & Plan:   Update labs, continue statin therapy along with diet and exercise, adjust dose if needed  Orders:  -     Lipid Panel; Future  -     Comprehensive Metabolic Panel; Future  -     TSH with Reflex to FT4; Future  4. Personal history of tobacco use  Assessment & Plan:   Counseled regarding need to discontinue tobacco dip use, explained increased risk for mouth and oral cavity cancers, gum and gingival disease among other side effects of tobacco.  Discussed with patient recommendations for CT scan of the lung screen  Orders:  -     WI VISIT TO DISCUSS LUNG CA SCREEN W LDCT  -     CT Lung Screen (Annual); Future  5.  Impaired fasting blood sugar  Assessment & Plan:   Blood sugar readings were borderline in the past, reinforced recommendations for healthy low-carb diet regular exercise and keeping BMI under 25  Orders:  -     Hemoglobin A1C; Future  -     CBC; Future  -     Comprehensive Metabolic Panel; Future  6. Prostate cancer screening  Assessment & Plan:   Denies any urinary symptoms, previous PSA within normal  Orders:  -     PSA, Prostatic Specific Antigen; Future  7. Gastroesophageal reflux disease without esophagitis  Assessment & Plan:   Symptoms stable and well-controlled on current dose of omeprazole 20 mg, he used to be on Nexium that was no longer covered by insurance. Force recommendations for antireflux diet, tobacco use cessation and GERD lifestyle modification changes  Orders:  -     omeprazole (PRILOSEC) 20 MG delayed release capsule; Take 1 capsule by mouth every morning (before breakfast), Disp-90 capsule, R-1Normal  8. Encounter to establish care with new doctor  Assessment & Plan:   Age-related health maintenance and immunization recommendations reviewed and discussed with patient, recommendation to update Prevnar 20 pneumonia vaccine made to patient, he prefers to complete that through his pharmacy  Need to update colonoscopy discussed with patient and he will reach out to his gastroenterologist  Depression screen is negative  Follow-up in 6 months for wellness visit    Return in about 6 months (around 2/18/2023) for AWV. SUBJECTIVE  HPI:   Patient here to establish new patient office visits, previous physician at the [facility] moved to private practice and no longer accepting Medicare. He is a 57-year-old male with known history of hypertension, hyperlipidemia and acid reflux, his chronic medical conditions are stable on current medications,  states he had to cut losartan dose in half because was getting dizzy.   He has past medical history of lumbar radiculopathy status postsurgery, this did not relieve the pain and discomfort in his legs which turned out to be secondary to peripheral vascular disease, he has stents placed in femoral arteries and currently asymptomatic and doing well  To be a longtime smoker of cigarettes and smoked for over 45 years then he switched to tobacco dip and still uses that daily. He is retired, he remains active by doing yard work and working around [employer]  He is due for recall colonoscopy that was done 5 years ago and planning to contact his gastroenterologist to schedule  He had abdominal aortic aneurysm screen completed last year  Denies any personal or family history of prostate cancer, denies any urinary symptoms      Review of Systems   Constitutional:  Negative for activity change, appetite change, fatigue and unexpected weight change. HENT:  Negative for congestion, hearing loss, mouth sores and sore throat. Eyes:  Negative for visual disturbance. Respiratory:  Negative for cough, chest tightness, shortness of breath and wheezing. Cardiovascular:  Negative for chest pain, palpitations and leg swelling. Gastrointestinal:  Negative for abdominal pain, constipation, nausea and vomiting. Endocrine: Negative for cold intolerance, heat intolerance and polyphagia. Genitourinary:  Negative for difficulty urinating, dysuria, frequency, hematuria and urgency. Musculoskeletal:  Positive for arthralgias. Negative for back pain, gait problem and joint swelling. Skin:  Negative for color change and wound. Allergic/Immunologic: Negative for environmental allergies and immunocompromised state. Neurological:  Negative for dizziness, syncope, light-headedness and headaches. Hematological:  Does not bruise/bleed easily. Psychiatric/Behavioral:  Negative for behavioral problems, dysphoric mood and sleep disturbance. The patient is not nervous/anxious. OBJECTIVE:    /69   Pulse 79   Ht 5' 10\" (1.778 m)   Wt 180 lb (81.6 kg)   SpO2 99%   BMI 25.83 kg/m²    Physical Exam  Constitutional:       Appearance: Normal appearance. He is normal weight. HENT:      Head: Normocephalic.       Right Ear: Tympanic membrane and ear canal normal.      Left Ear: Tympanic membrane and ear canal normal.      Nose: Nose normal.      Mouth/Throat:      Mouth: Mucous membranes are moist.      Pharynx: Oropharynx is clear. Eyes:      Extraocular Movements: Extraocular movements intact. Conjunctiva/sclera: Conjunctivae normal.      Pupils: Pupils are equal, round, and reactive to light. Cardiovascular:      Rate and Rhythm: Normal rate and regular rhythm. Pulses: Normal pulses. Heart sounds: Normal heart sounds. No murmur heard. Pulmonary:      Effort: Pulmonary effort is normal. No respiratory distress. Breath sounds: Normal breath sounds. No wheezing. Abdominal:      General: Bowel sounds are normal.      Palpations: Abdomen is soft. There is no mass. Tenderness: There is no abdominal tenderness. Musculoskeletal:         General: No swelling, deformity or signs of injury. Normal range of motion. Cervical back: Normal range of motion and neck supple. Right lower leg: No edema. Left lower leg: No edema. Skin:     General: Skin is warm and dry. Neurological:      General: No focal deficit present. Mental Status: He is alert and oriented to person, place, and time. Mental status is at baseline. Cranial Nerves: No cranial nerve deficit. Psychiatric:         Mood and Affect: Mood normal.         Behavior: Behavior normal.         Thought Content: Thought content normal.         Electronically signed by Tyler Lui MD on 8/18/2022 at 9:41 AM.    This dictation was generated by voice recognition computer software. Although all attempts are made to edit the dictation for accuracy, there may be errors in the transcription that are not intended.   Low Dose CT (LDCT) Lung Screening criteria met:     Age 50-77(Medicare) or 50-80 (USPSTF)   Pack year smoking >20   Still smoking or less than 15 year since quit   No sign or symptoms of lung cancer   > 11 months since last LDCT Risks and benefits of lung cancer screening with LDCT scans discussed:    Significance of positive screen - False-positive LDCT results often occur. 95% of all positive results do not lead to a diagnosis of cancer. Usually further imaging can resolve most false-positive results; however, some patients may require invasive procedures. Over diagnosis risk - 10% to 12% of screen-detected lung cancer cases are over diagnosed--that is, the cancer would not have been detected in the patient's lifetime without the screening. Need for follow up screens annually to continue lung cancer screening effectiveness     Risks associated with radiation from annual LDCT- Radiation exposure is about the same as for a mammogram, which is about 1/3 of the annual background radiation exposure from everyday life. Starting screening at age 54 is not likely to increase cancer risk from radiation exposure. Patients with comorbidities resulting in life expectancy of < 10 years, or that would preclude treatment of an abnormality identified on CT, should not be screened due to lack of benefit.     To obtain maximal benefit from this screening, smoking cessation and long-term abstinence from smoking is critical

## 2022-08-18 NOTE — ASSESSMENT & PLAN NOTE
Age-related health maintenance and immunization recommendations reviewed and discussed with patient, recommendation to update Prevnar 20 pneumonia vaccine made to patient, he prefers to complete that through his pharmacy  Need to update colonoscopy discussed with patient and he will reach out to his gastroenterologist  Depression screen is negative  Follow-up in 6 months for wellness visit

## 2022-08-18 NOTE — ASSESSMENT & PLAN NOTE
Blood pressure stable and well-controlled on current combination of losartan with hydrochlorothiazide, will change dose from 100-50 since patient has been taking half of the tablet, update labs, reinforced recommendations for salt restriction, smoking cessation, active lifestyle and low-salt diet

## 2022-08-18 NOTE — ASSESSMENT & PLAN NOTE
Stable, continue low-dose aspirin and statin therapy he no longer smokes cigarettes however still uses tobacco dip

## 2022-08-19 LAB
ESTIMATED AVERAGE GLUCOSE: 105.4 MG/DL
HBA1C MFR BLD: 5.3 %

## 2022-08-22 DIAGNOSIS — I73.9 PVD (PERIPHERAL VASCULAR DISEASE) (HCC): ICD-10-CM

## 2022-08-22 DIAGNOSIS — I10 PRIMARY HYPERTENSION: ICD-10-CM

## 2022-08-22 DIAGNOSIS — K21.9 GASTROESOPHAGEAL REFLUX DISEASE WITHOUT ESOPHAGITIS: ICD-10-CM

## 2022-08-22 RX ORDER — HYDROCHLOROTHIAZIDE 25 MG/1
TABLET ORAL
Qty: 90 TABLET | Refills: 1 | Status: SHIPPED | OUTPATIENT
Start: 2022-08-22

## 2022-08-22 RX ORDER — ATORVASTATIN CALCIUM 20 MG/1
20 TABLET, FILM COATED ORAL DAILY
Qty: 90 TABLET | Refills: 1 | Status: SHIPPED | OUTPATIENT
Start: 2022-08-22

## 2022-08-22 RX ORDER — OMEPRAZOLE 20 MG/1
20 CAPSULE, DELAYED RELEASE ORAL
Qty: 90 CAPSULE | Refills: 1 | Status: SHIPPED | OUTPATIENT
Start: 2022-08-22

## 2022-08-22 RX ORDER — LOSARTAN POTASSIUM 50 MG/1
TABLET ORAL
Qty: 90 TABLET | Refills: 1 | Status: SHIPPED | OUTPATIENT
Start: 2022-08-22

## 2022-08-23 DIAGNOSIS — I10 PRIMARY HYPERTENSION: ICD-10-CM

## 2022-08-23 DIAGNOSIS — I73.9 PVD (PERIPHERAL VASCULAR DISEASE) (HCC): ICD-10-CM

## 2022-08-23 RX ORDER — HYDROCHLOROTHIAZIDE 25 MG/1
TABLET ORAL
Qty: 90 TABLET | Refills: 1 | OUTPATIENT
Start: 2022-08-23

## 2022-08-23 RX ORDER — ATORVASTATIN CALCIUM 20 MG/1
20 TABLET, FILM COATED ORAL DAILY
Qty: 90 TABLET | Refills: 1 | OUTPATIENT
Start: 2022-08-23

## 2022-08-23 RX ORDER — LOSARTAN POTASSIUM 50 MG/1
TABLET ORAL
Qty: 90 TABLET | Refills: 1 | OUTPATIENT
Start: 2022-08-23

## 2022-09-17 PROBLEM — Z12.5 PROSTATE CANCER SCREENING: Status: RESOLVED | Noted: 2022-08-18 | Resolved: 2022-09-17

## 2022-10-31 NOTE — PROGRESS NOTES
Patient __X_ reached   _____not reached-preop instructions left on voice ietn____[phone]_________      DATE__11/7/22______ TIME____1200____ARRIVAL___1030  FEC______      Nothing to eat or drink after midnight the night before,except for what the prep instructions call for. If you do not have the instructions or do not understand them please contact your doctors office. Follow any instructions your doctors office has given you including what medications to take the AM of your procedure and which ones to hold. You may use your inhalers - bring rescue inhalers with you DOS. If you take a long acting insulin the carol prior please cut the dose in half and take no diabetic medications that AM.Follow specific doctors office instructions regarding blood thinners and if they want you to hold and for how long. If you are on a blood thinner and have no instructions please contact the office and ask. Dress comfortably,bring your insurance card,picture ID,and a complete list of medications, including supplements. You must have a responsible adult to stay with you during the procedure,drive you home and stay with you. [facility] phone number [phone] for any questions. OTHER INSTRUCTIONS(if applicable)_________________________________________________________        VISITOR POLICY(subject to change)    Current visitor policy is 2 visitors per patient. No children allowed. Mask at discretion of facility. Visiting hours are 8a-8p. Overnight visitors will be at the discretion of the nurse. All policies are subject to change.

## 2022-11-07 ENCOUNTER — ANESTHESIA (OUTPATIENT)
Dept: ENDOSCOPY | Age: 67
End: 2022-11-07
Payer: COMMERCIAL

## 2022-11-07 ENCOUNTER — ANESTHESIA EVENT (OUTPATIENT)
Dept: ENDOSCOPY | Age: 67
End: 2022-11-07
Payer: COMMERCIAL

## 2022-11-07 ENCOUNTER — HOSPITAL ENCOUNTER (OUTPATIENT)
Age: 67
Setting detail: OUTPATIENT SURGERY
Discharge: HOME HEALTH | End: 2022-11-07
Attending: INTERNAL MEDICINE | Admitting: INTERNAL MEDICINE
Payer: COMMERCIAL

## 2022-11-07 VITALS
HEART RATE: 59 BPM | RESPIRATION RATE: 16 BRPM | WEIGHT: 180 LBS | BODY MASS INDEX: 25.2 KG/M2 | SYSTOLIC BLOOD PRESSURE: 129 MMHG | HEIGHT: 71 IN | OXYGEN SATURATION: 100 % | TEMPERATURE: 97.2 F | DIASTOLIC BLOOD PRESSURE: 75 MMHG

## 2022-11-07 PROCEDURE — 3700000000 HC ANESTHESIA ATTENDED CARE: Performed by: INTERNAL MEDICINE

## 2022-11-07 PROCEDURE — 2500000003 HC RX 250 WO HCPCS: Performed by: NURSE ANESTHETIST, CERTIFIED REGISTERED

## 2022-11-07 PROCEDURE — 3609027000 HC COLONOSCOPY: Performed by: INTERNAL MEDICINE

## 2022-11-07 PROCEDURE — 7100000010 HC PHASE II RECOVERY - FIRST 15 MIN: Performed by: INTERNAL MEDICINE

## 2022-11-07 PROCEDURE — 7100000011 HC PHASE II RECOVERY - ADDTL 15 MIN: Performed by: INTERNAL MEDICINE

## 2022-11-07 PROCEDURE — 6360000002 HC RX W HCPCS: Performed by: NURSE ANESTHETIST, CERTIFIED REGISTERED

## 2022-11-07 PROCEDURE — 3700000001 HC ADD 15 MINUTES (ANESTHESIA): Performed by: INTERNAL MEDICINE

## 2022-11-07 PROCEDURE — 2580000003 HC RX 258: Performed by: ANESTHESIOLOGY

## 2022-11-07 PROCEDURE — 2709999900 HC NON-CHARGEABLE SUPPLY: Performed by: INTERNAL MEDICINE

## 2022-11-07 RX ORDER — LIDOCAINE HYDROCHLORIDE 20 MG/ML
INJECTION, SOLUTION INFILTRATION; PERINEURAL PRN
Status: DISCONTINUED | OUTPATIENT
Start: 2022-11-07 | End: 2022-11-07 | Stop reason: SDUPTHER

## 2022-11-07 RX ORDER — PROPOFOL 10 MG/ML
INJECTION, EMULSION INTRAVENOUS PRN
Status: DISCONTINUED | OUTPATIENT
Start: 2022-11-07 | End: 2022-11-07 | Stop reason: SDUPTHER

## 2022-11-07 RX ORDER — SODIUM CHLORIDE 9 MG/ML
INJECTION, SOLUTION INTRAVENOUS CONTINUOUS
Status: DISCONTINUED | OUTPATIENT
Start: 2022-11-07 | End: 2022-11-07 | Stop reason: HOSPADM

## 2022-11-07 RX ORDER — MULTIVIT-MIN/IRON/FOLIC ACID/K 18-600-40
CAPSULE ORAL DAILY
COMMUNITY

## 2022-11-07 RX ADMIN — PROPOFOL 50 MG: 10 INJECTION, EMULSION INTRAVENOUS at 11:35

## 2022-11-07 RX ADMIN — LIDOCAINE HYDROCHLORIDE 100 MG: 20 INJECTION, SOLUTION INFILTRATION; PERINEURAL at 11:27

## 2022-11-07 RX ADMIN — PROPOFOL 50 MG: 10 INJECTION, EMULSION INTRAVENOUS at 11:29

## 2022-11-07 RX ADMIN — SODIUM CHLORIDE: 9 INJECTION, SOLUTION INTRAVENOUS at 11:21

## 2022-11-07 RX ADMIN — PROPOFOL 100 MG: 10 INJECTION, EMULSION INTRAVENOUS at 11:27

## 2022-11-07 ASSESSMENT — PAIN SCALES - GENERAL: PAINLEVEL_OUTOF10: 0

## 2022-11-07 ASSESSMENT — PAIN - FUNCTIONAL ASSESSMENT: PAIN_FUNCTIONAL_ASSESSMENT: NONE - DENIES PAIN

## 2022-11-07 NOTE — ANESTHESIA PRE PROCEDURE
Department of Anesthesiology  Preprocedure Note       Name:  Anthony Veloz   Age:  75 y.o.  :  1955                                          MRN:  [id number]         Date:  2022      Surgeon: Marina Maharaj):  Rigo Ayala MD    Procedure: Procedure(s):  COLONOSCOPY DIAGNOSTIC    Medications prior to admission:   Prior to Admission medications    Medication Sig Start Date End Date Taking? Authorizing Provider   atorvastatin (LIPITOR) 20 MG tablet Take 1 tablet by mouth daily 22   Amirah Engle MD   hydroCHLOROthiazide (HYDRODIURIL) 25 MG tablet TAKE 1 TABLET BY MOUTH DAILY. 22   Amirah Engle MD   losartan (COZAAR) 50 MG tablet TAKE 1 TABLET DAILY 22   Amirah Engle MD   omeprazole (PRILOSEC) 20 MG delayed release capsule Take 1 capsule by mouth every morning (before breakfast) 22   Amirah Engle MD   aspirin 81 MG tablet Take 81 mg by mouth daily. Historical Provider, MD       Current medications:    No current facility-administered medications for this encounter. Allergies:     Allergies   Allergen Reactions    Chantix [Varenicline]      Pananoia       Problem List:    Patient Active Problem List   Diagnosis Code    Personal history of tobacco use Z87.891    Radiculopathy, lumbar region M54.16    Hypertension I10    PVD (peripheral vascular disease) ([facility] [address] I73.9    Colon polyp K63.5    Mixed hyperlipidemia E78.2    Degeneration of cervical intervertebral disc M50.30    Degeneration of lumbar or lumbosacral intervertebral disc M51.37    Low back pain M54.50    Vitamin D deficiency E55.9    Impaired fasting blood sugar R73.01    Gastroesophageal reflux disease without esophagitis K21.9    Encounter to establish care with new doctor Z76.89       Past Medical History:        Diagnosis Date    Hypertension        Past Surgical History:        Procedure Laterality Date    BACK SURGERY      Dr. Yara Hodges  posterior right ear     OTHER SURGICAL HISTORY Bilateral     Dr. Gonsales Solid; Iliac stents    TONSILLECTOMY         Social History:    Social History     Tobacco Use    Smoking status: Former     Packs/day: 0.75     Years: 45.00     Pack years: 33.75     Types: Cigarettes     Quit date: 2018     Years since quittin.2    Smokeless tobacco: Current     Types: Chew   Substance Use Topics    Alcohol use: Yes     Alcohol/week: 14.0 standard drinks     Types: 14 Shots of liquor per week     Comment: 2 drinks a day                                Ready to quit: Not Answered  Counseling given: Not Answered      Vital Signs (Current):   Vitals:    10/31/22 1419   Weight: 180 lb (81.6 kg)   Height: 5' 11\" (1.803 m)                                              BP Readings from Last 3 Encounters:   22 138/69   19 108/62   18 114/72       NPO Status:                                                                                 BMI:   Wt Readings from Last 3 Encounters:   10/31/22 180 lb (81.6 kg)   22 180 lb (81.6 kg)   19 202 lb 6.4 oz (91.8 kg)     Body mass index is 25.1 kg/m².     CBC:   Lab Results   Component Value Date/Time    WBC 6.8 2022 11:02 AM    RBC 3.63 2022 11:02 AM    HGB 12.6 2022 11:02 AM    HCT 37.1 2022 11:02 AM    .0 2022 11:02 AM    RDW 13.0 2022 11:02 AM     2022 11:02 AM       CMP:   Lab Results   Component Value Date/Time     2022 11:02 AM    K 4.8 2022 11:02 AM    CL 94 2022 11:02 AM    CO2 27 2022 11:02 AM    BUN 26 2022 11:02 AM    CREATININE 1.1 2022 11:02 AM    GFRAA >60 2022 11:02 AM    AGRATIO 1.8 2022 11:02 AM    LABGLOM >60 2022 11:02 AM    LABGLOM 71 2015 08:55 AM    GLUCOSE 104 2022 11:02 AM    PROT 6.7 2022 11:02 AM    CALCIUM 9.1 2022 11:02 AM    BILITOT 0.4 2022 11:02 AM    ALKPHOS 53 2022 11:02 AM    AST 23 2022 11:02 AM ALT 12 08/18/2022 11:02 AM       POC Tests: No results for input(s): POCGLU, POCNA, POCK, POCCL, POCBUN, POCHEMO, POCHCT in the last 72 hours. Coags:   Lab Results   Component Value Date/Time    PROTIME 10.2 04/01/2013 01:34 PM    INR 0.89 04/01/2013 01:34 PM    APTT 26.1 04/01/2013 01:34 PM       HCG (If Applicable): No results found for: PREGTESTUR, PREGSERUM, HCG, HCGQUANT     ABGs: No results found for: PHART, PO2ART, RZY1BZN, TXG6PRY, BEART, O2NUJBMM     Type & Screen (If Applicable):  No results found for: LABABO, LABRH    Drug/Infectious Status (If Applicable):  Lab Results   Component Value Date/Time    HEPCAB NON-REACTIVE 04/09/2019 12:32 PM       COVID-19 Screening (If Applicable): No results found for: COVID19        Anesthesia Evaluation  Patient summary reviewed and Nursing notes reviewed  Airway: Mallampati: II          Dental:          Pulmonary:                              Cardiovascular:    (+) hypertension:,                   Neuro/Psych:   (+) neuromuscular disease:,             GI/Hepatic/Renal:   (+) GERD:,           Endo/Other:                     Abdominal:             Vascular:           Other Findings:           Anesthesia Plan      MAC     ASA 3                                   Saad Neri MD   11/7/2022

## 2022-11-07 NOTE — H&P
Gastroenterology Note                 Pre-operative History and Physical    Patient: Alyse Klinefelter  : 1955  CSN:     History Obtained From:   Patient or guardian. HISTORY OF PRESENT ILLNESS:    The patient is a 75 y.o. male here for Endoscopy. Past Medical History:    Past Medical History:   Diagnosis Date    Colon polyp     GERD (gastroesophageal reflux disease)     Hyperlipidemia     Hypertension     PVD (peripheral vascular disease) ([facility] [address]      Past Surgical History:    Past Surgical History:   Procedure Laterality Date    BACK SURGERY  2013    Dr. Liana Sue  posterior right ear     OTHER SURGICAL HISTORY Bilateral 2013    Dr. Sharyle Myron; Iliac stents    TONSILLECTOMY       Medications Prior to Admission:   No current facility-administered medications on file prior to encounter. Current Outpatient Medications on File Prior to Encounter   Medication Sig Dispense Refill    Cholecalciferol (VITAMIN D) 50 MCG ( UT) CAPS capsule Take by mouth daily      atorvastatin (LIPITOR) 20 MG tablet Take 1 tablet by mouth daily 90 tablet 1    hydroCHLOROthiazide (HYDRODIURIL) 25 MG tablet TAKE 1 TABLET BY MOUTH DAILY. 90 tablet 1    losartan (COZAAR) 50 MG tablet TAKE 1 TABLET DAILY 90 tablet 1    omeprazole (PRILOSEC) 20 MG delayed release capsule Take 1 capsule by mouth every morning (before breakfast) 90 capsule 1    aspirin 81 MG tablet Take 81 mg by mouth daily. Allergies:  Chantix [varenicline]      Social History:   Social History     Tobacco Use    Smoking status: Former     Packs/day: 0.75     Years: 45.00     Pack years: 33.75     Types: Cigarettes     Quit date: 2018     Years since quittin.2    Smokeless tobacco: Current     Types: Chew   Substance Use Topics    Alcohol use:  Yes     Alcohol/week: 14.0 standard drinks     Types: 14 Shots of liquor per week     Comment: 2 drinks every other day     Family History: Family History   Problem Relation Age of Onset    Breast Cancer Mother     Prostate Cancer Father 70       PHYSICAL EXAM:      BP (!) 151/74   Pulse 64   Temp (!) 96.5 °F (35.8 °C) (Temporal)   Resp 14   Ht 5' 11\" (1.803 m)   Wt 180 lb (81.6 kg)   SpO2 100%   BMI 25.10 kg/m²  I        Heart:  RRR, normal s1s2    Lungs:  CTA and normal effort    Abdomen:   Soft, nt nd. ASSESSMENT AND PLAN:    1. Patient is a 75 y.o. male here for endoscopy with MAC sedation. 2.  Procedure options, risks and benefits reviewed with patient and/or guardian. They express understanding.

## 2022-11-07 NOTE — DISCHARGE INSTRUCTIONS
COLONSCOPY DISCHARGE INSTRUCTIONS    You may experience some lightheadedness for the next several hours. Plan on quiet relaxation for the rest of today. Nap for four hours following procedure if possible. A responsible adult needs to stay with you today. Eat bland food and avoid anything greasy or spicy initially-progress to your normal diet gradually. Diet restrictions as instructed. You may resume home medications as instructed. It is not unusual to experience some mild cramping or gas pains, and you may not have a bowel movement for several days. If you had a polyp removed, avoid strenuous activity for 48 hours. Avoid the use of aspirin or related compounds for one week, unless otherwise instructed by your physician. You may notice a small amount of blood in your next few bowel movements, but if a large amount passes, call your physician. If you have any of the following problems, notify your physician or return to the hospital emergency room : fever, chills, excessive bleeding, excessive vomiting, difficulty swallowing, uncontrolled pain, increased abdominal distention, shortness of breath or any other problems. Call your doctor at [phone] if you have any concerns. If you had any biopsies or polyps call for results in 5-7 business days. See your physician's report for details about your procedure and recommendations. ANESTHESIA DISCHARGE INSTRUCTIONS    Wear your seatbelt home. You are under the influence of drugs-do not drink alcohol, drive ,operate machinery,or make any important decisions or sign any legal documentsfor 24 hours. You may resume normal activities tomorrow. A responsible adult needs to be with you for 24 hours. You may experience lightheadedness,dizziness,or sleepiness following surgery. Rest at home today- increase activity as tolerated. It is recommended to take a four hour nap after procedure.   Progress slowly to a regular diet unless your physician has instructed you otherwise. Avoid spicy and greasy food on first meal.  Drink plenty of water. If nausea becomes a problem call your physician. Call your doctor if concerns arise.

## 2022-11-07 NOTE — ANESTHESIA POSTPROCEDURE EVALUATION
Department of Anesthesiology  Postprocedure Note    Patient: Evangelina Frank  MRN: [id number]  YOB: 1955  Date of evaluation: 11/7/2022      Procedure Summary     Date: 11/07/22 Room / Location: [address] [place] [address]    Anesthesia Start: 9141 Anesthesia Stop: 2690    Procedure: COLONOSCOPY DIAGNOSTIC Diagnosis:       Colon cancer screening      History of colon polyps      (Colon cancer screening [Z12.11])      (History of colon polyps [Z86.010])    Surgeons: Winfield Curling, MD Responsible Provider: Robin Liao MD    Anesthesia Type: MAC ASA Status: 3          Anesthesia Type: No value filed.     Eitan Phase I: Eitan Score: 10    Eitan Phase II: Eitan Score: 9      Anesthesia Post Evaluation    Patient location during evaluation: PACU  Patient participation: complete - patient participated  Level of consciousness: awake and alert  Airway patency: patent  Nausea & Vomiting: no vomiting and no nausea  Complications: no  Cardiovascular status: hemodynamically stable  Respiratory status: acceptable  Hydration status: stable

## 2022-11-15 LAB
BUN BLDV-MCNC: 22 MG/DL (ref 7–20)
CREAT SERPL-MCNC: 1.2 MG/DL (ref 0.8–1.3)
GFR SERPL CREATININE-BSD FRML MDRD: >60 ML/MIN/{1.73_M2}

## 2022-11-22 ENCOUNTER — HOSPITAL ENCOUNTER (OUTPATIENT)
Dept: CT IMAGING | Age: 67
Discharge: HOME | End: 2022-11-22
Payer: COMMERCIAL

## 2022-11-22 DIAGNOSIS — K63.9 CECAL LESION: ICD-10-CM

## 2022-11-22 PROCEDURE — 74177 CT ABD & PELVIS W/CONTRAST: CPT

## 2022-11-22 PROCEDURE — 6360000004 HC RX CONTRAST MEDICATION: Performed by: INTERNAL MEDICINE

## 2022-11-22 RX ADMIN — IOPAMIDOL 75 ML: 755 INJECTION, SOLUTION INTRAVENOUS at 16:01

## 2022-11-22 RX ADMIN — DIATRIZOATE MEGLUMINE AND DIATRIZOATE SODIUM 20 ML: 660; 100 LIQUID ORAL; RECTAL at 16:01

## 2022-12-01 ENCOUNTER — OFFICE VISIT (OUTPATIENT)
Dept: SURGERY | Age: 67
End: 2022-12-01

## 2022-12-01 VITALS — BODY MASS INDEX: 23.99 KG/M2 | DIASTOLIC BLOOD PRESSURE: 65 MMHG | SYSTOLIC BLOOD PRESSURE: 158 MMHG | WEIGHT: 172 LBS

## 2022-12-01 DIAGNOSIS — K63.89 CECUM MASS: Primary | ICD-10-CM

## 2022-12-01 ASSESSMENT — ENCOUNTER SYMPTOMS
RESPIRATORY NEGATIVE: 1
ALLERGIC/IMMUNOLOGIC NEGATIVE: 1
GASTROINTESTINAL NEGATIVE: 1
EYES NEGATIVE: 1

## 2022-12-01 NOTE — PROGRESS NOTES
[facility] GENERAL AND LAPAROSCOPIC SURGERY                       PATIENT NAME: Laura Vilchis        TODAY'S DATE: 12/1/2022    Reason for Consult: Mass    Requesting Physician / PCP:  Dr. Danae Franklin / Dr. Aly Noguera:              The patient is a 75 y.o. male who presents with a mass noted on colonoscopy. Had submucosal or external lesion noted at cecal base on most recent colonoscopy. Last scope 5 years ago was fine. No pain or bleeding in BM. No weight loss. Normal appetite. Has had CT showing mass in the area. No prior abdominal surgery or GI disorders. Past Medical History:        Diagnosis Date    Colon polyp     GERD (gastroesophageal reflux disease)     Hyperlipidemia     Hypertension     PVD (peripheral vascular disease) [facility])        Past Surgical History:        Procedure Laterality Date    BACK SURGERY  01/01/2013    Dr. Bedoya Or    COLONOSCOPY      COLONOSCOPY N/A 11/7/2022    COLONOSCOPY DIAGNOSTIC performed by Michael Gonzalez MD at [address]  posterior right ear 1980    OTHER SURGICAL HISTORY Bilateral 01/01/2013    Dr. Shreyas Sue; Iliac stents    TONSILLECTOMY         Current Medications:   No current facility-administered medications for this visit. Prior to Admission medications    Medication Sig Start Date End Date Taking? Authorizing Provider   Cholecalciferol (VITAMIN D) 50 MCG (2000 UT) CAPS capsule Take by mouth daily   Yes Historical Provider, MD   atorvastatin (LIPITOR) 20 MG tablet Take 1 tablet by mouth daily 8/22/22  Yes Lexa Mercado MD   hydroCHLOROthiazide (HYDRODIURIL) 25 MG tablet TAKE 1 TABLET BY MOUTH DAILY. 8/22/22  Yes Lexa Mercado MD   losartan (COZAAR) 50 MG tablet TAKE 1 TABLET DAILY 8/22/22  Yes Lexa Mercado MD   omeprazole (PRILOSEC) 20 MG delayed release capsule Take 1 capsule by mouth every morning (before breakfast) 8/22/22  Yes Lexa Mercado MD   aspirin 81 MG tablet Take 81 mg by mouth daily.    Yes Historical Provider, MD        Allergies:  Chantix [varenicline]    Social History:    reports that he quit smoking about 4 years ago. His smoking use included cigarettes. He has a 33.75 pack-year smoking history. His smokeless tobacco use includes chew. He reports current alcohol use of about 14.0 standard drinks per week. He reports that he does not use drugs. Family History:        Problem Relation Age of Onset    Breast Cancer Mother     Prostate Cancer Father 70       REVIEW OF SYSTEMS:  Review of Systems   Constitutional: Negative. HENT: Negative. Eyes: Negative. Respiratory: Negative. Cardiovascular: Negative. Gastrointestinal: Negative. Endocrine: Negative. Genitourinary: Negative. Musculoskeletal: Negative. Skin: Negative. Allergic/Immunologic: Negative. Neurological: Negative. Hematological: Negative. Psychiatric/Behavioral: Negative.        PHYSICAL EXAM:  VITALS:  BP (!) 158/65   Wt 172 lb (78 kg)   BMI 23.99 kg/m²   CONSTITUTIONAL:  alert, no apparent distress and normal weight  EYES:  sclera clear  ENT:  normocepalic, without obvious abnormality  NECK:  supple, symmetrical, trachea midline and no carotid bruits  LUNGS:  clear to auscultation  CARDIOVASCULAR:  regular rate and rhythm and no murmur noted  ABDOMEN:  no scars, normal bowel sounds, soft, non-distended, non-tender, voluntary guarding absent, no masses palpated, no hepatosplenomegally and hernia absent  MUSCULOSKELETAL:  0+ pitting edema lower extremities  NEUROLOGIC:  Mental Status Exam:  Level of Alertness:   awake  Orientation:   person, place, time  SKIN:  no bruising or bleeding, normal skin color, texture, turgor, and no redness, warmth, or swelling     Latest Reference Range & Units 8/18/22 11:02 11/15/22 10:07   Sodium 136 - 145 mmol/L 132 (L)    Potassium 3.5 - 5.1 mmol/L 4.8    Chloride 99 - 110 mmol/L 94 (L)    CO2 21 - 32 mmol/L 27    BUN,BUNPL 7 - 20 mg/dL 26 (H) 22 (H)   Creatinine 0.8 - 1.3 mg/dL 1.1 1.2   Anion Gap 3 - 16  11    Est, Glom Filt Rate >60   >60   GFR Non- >60  >60    GFR  >60  >60    Glucose, Random 70 - 99 mg/dL 104 (H)    CALCIUM, SERUM, 677674 8.3 - 10.6 mg/dL 9.1    Total Protein 6.4 - 8.2 g/dL 6.7    CHOLESTEROL, TOTAL, 547492 0 - 199 mg/dL 150    HDL Cholesterol 40 - 60 mg/dL 74 (H)    LDL Calculated <100 mg/dL 60    Triglycerides 0 - 150 mg/dL 80    VLDL Cholesterol Calculated Not Established mg/dL 16    Albumin 3.4 - 5.0 g/dL 4.3    Albumin/Globulin Ratio 1.1 - 2.2  1.8    Alk Phos 40 - 129 U/L 53    ALT 10 - 40 U/L 12    AST 15 - 37 U/L 23    Bilirubin 0.0 - 1.0 mg/dL 0.4    Hemoglobin A1C See comment % 5.3    eAG (mg/dL) mg/dL 105.4    TSH Reflex FT4 0.27 - 4.20 uIU/mL 0.64    WBC 4.0 - 11.0 K/uL 6.8    RBC 4.20 - 5.90 M/uL 3.63 (L)    Hemoglobin Quant 13.5 - 17.5 g/dL 12.6 (L)    Hematocrit 40.5 - 52.5 % 37.1 (L)    MCV 80.0 - 100.0 fL 102.0 (H)    MCH 26.0 - 34.0 pg 34.8 (H)    MCHC 31.0 - 36.0 g/dL 34.1    MPV 5.0 - 10.5 fL 9.3    RDW 12.4 - 15.4 % 13.0    Platelet Count 687 - 450 K/uL 249    (L): Data is abnormally low  (H): Data is abnormally high    Radiology Review:    EXAMINATION:   CT OF THE ABDOMEN AND PELVIS WITH CONTRAST 11/22/2022 3:57 pm       TECHNIQUE:   CT of the abdomen and pelvis was performed with the administration of   intravenous contrast. Multiplanar reformatted images are provided for review. Automated exposure control, iterative reconstruction, and/or weight based   adjustment of the mA/kV was utilized to reduce the radiation dose to as low   as reasonably achievable. COMPARISON:   03/22/2016       HISTORY:   ORDERING SYSTEM PROVIDED HISTORY: Cecal lesion   TECHNOLOGIST PROVIDED HISTORY:   Additional Contrast?->Oral   STAT Creatinine as needed:->No   Reason for Exam: Dx: Cecal lesion K63.9 (ICD-10-CM)       FINDINGS:   Lower Chest: There are mild bibasilar emphysematous changes.   There is no consolidation or effusion. Organs: The liver, pancreas, spleen, kidneys and adrenals are unremarkable   aside from a nonobstructing 2 mm left intrarenal calculus. GI/Bowel: There is no bowel dilatation, wall thickening or obstruction. The   appendix is fluid-filled and mildly dilated, measuring 8 mm. There is an   apparent 1.3 cm low-attenuation mass at the base of the appendix within the   cecal lumen. The remainder the bowel is unremarkable. Pelvis: The bladder and prostate are unremarkable. Peritoneum/Retroperitoneum: There is no free air, free fluid or   intraperitoneal inflammatory change. There is no adenopathy. Bones/Soft Tissues: There is no acute fracture or aggressive osseous lesion. Moderate atherosclerotic changes are present throughout the aortoiliac system. Impression   Nonspecific 13 mm cecal mass with associated appendiceal enlargement. The   differential includes carcinoma and mucocele. IMPRESSION/RECOMMENDATIONS:    Cecal, possible appendiceal mass, etiology uncertain    Recommend laparoscopy and resection  Site possibly amenable to appendectomy with base of cecum included.  If not, then will do ileocecectomy  DW pt all Q answered, understands and agrees to proceed    Thank you,      Julio Cervantes MD

## 2022-12-02 NOTE — PROGRESS NOTES
Name_______________________________________Printed:____________________  Date and time of surgery__12/9/2022___1115___________________Arrival Time:___0945_main____________   1. The instructions given regarding when and if a patient needs to stop oral intake prior to surgery varies. Follow the specific instructions you were given                  _x__Nothing to eat or to drink after Midnight the night before.                   ____Carbo loading or ERAS instructions will be given to select patients-if you have been given those instructions -please do the following                           The evening before your surgery after dinner before midnight drink 40 ounces of gatorade. If you are diabetic use sugar free. The morning of surgery drink 40 ounces of water. This needs to be finished 3 hours prior to your surgery start time. 2. Take the following pills with a small sip of water on the morning of surgery_omeprazole__________________________________________________                  Do not take blood pressure medications ending in pril or sartan the carol prior to surgery or the morning of surgery. Dr Wheeler Main patient are not to take any medications the AM of surgery. 3. Aspirin, Ibuprofen, Advil, Naproxen, Vitamin E and other Anti-inflammatory products and supplements should be stopped for 5 -7days before surgery or as directed by your physician. 4. Check with your Doctor regarding stopping Plavix, Coumadin,Eliquis, Lovenox,Effient,Pradaxa,Xarelto, Fragmin or other blood thinners and follow their instructions. 5. Do not smoke, and do not drink any alcoholic beverages 24 hours prior to surgery. This includes NA Beer. Refrain from the usage of any recreational drugs. 6. You may brush your teeth and gargle the morning of surgery. DO NOT SWALLOW WATER   7. You MUST make arrangements for a responsible adult to stay on site while you are here and take you home after your surgery.  You will not be allowed to leave alone or drive yourself home. It is strongly suggested someone stay with you the first 24 hrs. Your surgery will be cancelled if you do not have a ride home. 8. A parent/legal guardian must accompany a child scheduled for surgery and plan to stay at the hospital until the child is discharged. Please do not bring other children with you. 9. Please wear simple, loose fitting clothing to the hospital.  Zachariah Hi not bring valuables (money, credit cards, checkbooks, etc.) Do not wear any makeup (including no eye makeup) or nail polish on your fingers or toes. 10. DO NOT wear any jewelry or piercings on day of surgery. All body piercing jewelry must be removed. 11. If you have ___dentures, they will be removed before going to the OR; we will provide you a container. If you wear ___contact lenses or _x__glasses, they will be removed; please bring a case for them. 12. Please see your family doctor/pediatrician for a history & physical and/or concerning medications. Bring any test results/reports from your physician's office. PCP__________________Phone___________H&P Appt. Date________             13 If you  have a Living Will and Durable Power of  for Healthcare, please bring in a copy. 15. Notify your Surgeon if you develop any illness between now and surgery  time, cough, cold, fever, sore throat, nausea, vomiting, etc.  Please notify your surgeon if you experience dizziness, shortness of breath or blurred vision between now & the time of your surgery             15. DO NOT shave your operative site 96 hours prior to surgery. For face & neck surgery, men may use an electric razor 48 hours prior to surgery. 16. Shower the night before or morning of surgery using an antibacterial soap or as you have been instructed. 17. To provide excellent care visitors will be limited to one in the room at any given time.              18.  Please bring picture ID and insurance card. 19.  Visit our web site for additional information:  [email or web address]              20.During flu season no children under the age of 15 are permitted in the hospital for the safety of all patients. 21. If you take a long acting insulin in the evening only  take half of your usual  dose the night  before your procedure              22. If you use a c-pap please bring DOS if staying overnight,             23.For your convenience [facility] has a pharmacy on site to fill your prescriptions. 24. If you use oxygen and have a portable tank please bring it  with you the DOS             25. Bring a complete list of all your medications with name and dose include any supplements. 26. Other__________________________________________   *Please call pre admission testing if you any further questions   Darren Rodriguez   [address] [place] [address]. [place]  [phone]   [address] [place] [address]       VISITOR POLICY(subject to change)    Current policy is 2 visitors per patient. No children. Mask is  at the discretion of the facility. Visiting hours are 8a-8p. Overnight visitors will be at the discretion of the nurse. All policies subject to change. All above information reviewed with patient in person or by phone. Patient verbalizes understanding. All questions and concerns addressed.                                                                                                  Patient/Rep_patient___________________                                                                                                                                    PRE OP INSTRUCTIONS

## 2022-12-05 ENCOUNTER — HOSPITAL ENCOUNTER (OUTPATIENT)
Age: 67
Discharge: HOME | End: 2022-12-05
Payer: COMMERCIAL

## 2022-12-05 ENCOUNTER — TELEPHONE (OUTPATIENT)
Dept: SURGERY | Age: 67
End: 2022-12-05

## 2022-12-05 DIAGNOSIS — Z01.818 PREOP TESTING: ICD-10-CM

## 2022-12-05 LAB
ABO/RH: NORMAL
ANION GAP SERPL CALCULATED.3IONS-SCNC: 15 MMOL/L (ref 3–16)
ANTIBODY SCREEN: NORMAL
BUN BLDV-MCNC: 25 MG/DL (ref 7–20)
CALCIUM SERPL-MCNC: 9 MG/DL (ref 8.3–10.6)
CHLORIDE BLD-SCNC: 98 MMOL/L (ref 99–110)
CO2: 26 MMOL/L (ref 21–32)
CREAT SERPL-MCNC: 1.2 MG/DL (ref 0.8–1.3)
GFR SERPL CREATININE-BSD FRML MDRD: >60 ML/MIN/{1.73_M2}
GLUCOSE BLD-MCNC: 105 MG/DL (ref 70–99)
HCT VFR BLD CALC: 37.4 % (ref 40.5–52.5)
HEMOGLOBIN: 12.5 G/DL (ref 13.5–17.5)
MCH RBC QN AUTO: 33.4 PG (ref 26–34)
MCHC RBC AUTO-ENTMCNC: 33.4 G/DL (ref 31–36)
MCV RBC AUTO: 99.9 FL (ref 80–100)
PDW BLD-RTO: 13 % (ref 12.4–15.4)
PLATELET # BLD: 261 K/UL (ref 135–450)
PMV BLD AUTO: 9.7 FL (ref 5–10.5)
POTASSIUM SERPL-SCNC: 5 MMOL/L (ref 3.5–5.1)
RBC # BLD: 3.74 M/UL (ref 4.2–5.9)
SODIUM BLD-SCNC: 139 MMOL/L (ref 136–145)
WBC # BLD: 4.6 K/UL (ref 4–11)

## 2022-12-05 PROCEDURE — 86900 BLOOD TYPING SEROLOGIC ABO: CPT

## 2022-12-05 PROCEDURE — 86850 RBC ANTIBODY SCREEN: CPT

## 2022-12-05 PROCEDURE — 80048 BASIC METABOLIC PNL TOTAL CA: CPT

## 2022-12-05 PROCEDURE — 36415 COLL VENOUS BLD VENIPUNCTURE: CPT

## 2022-12-05 PROCEDURE — 86901 BLOOD TYPING SEROLOGIC RH(D): CPT

## 2022-12-05 PROCEDURE — 85027 COMPLETE CBC AUTOMATED: CPT

## 2022-12-05 NOTE — TELEPHONE ENCOUNTER
PATIENT SCHEDULED FOR SURGERY ON 12-9-22. P.A. T ASK HIM ABOUT BOWEL PREP. PATIENT WAS GIVEN BOWEL PREP INSTRUCTIONS AND ADVISED YOU WOULD CALL HIM TO LET HIM KNOW IF HE NEEDS TO DO THE PREP PRIOR TO SURGERY.  [phone]

## 2022-12-06 NOTE — TELEPHONE ENCOUNTER
I called and informed pt that he will have to have a bowel prep. Will email it to him ([email or web address])

## 2022-12-09 ENCOUNTER — ANESTHESIA (OUTPATIENT)
Dept: OPERATING ROOM | Age: 67
End: 2022-12-09
Payer: COMMERCIAL

## 2022-12-09 ENCOUNTER — HOSPITAL ENCOUNTER (OUTPATIENT)
Age: 67
Setting detail: SURGERY ADMIT
Discharge: HOME | End: 2022-12-09
Attending: SURGERY | Admitting: SURGERY
Payer: COMMERCIAL

## 2022-12-09 ENCOUNTER — ANESTHESIA EVENT (OUTPATIENT)
Dept: OPERATING ROOM | Age: 67
End: 2022-12-09
Payer: COMMERCIAL

## 2022-12-09 VITALS
TEMPERATURE: 97.3 F | WEIGHT: 168 LBS | HEIGHT: 71 IN | SYSTOLIC BLOOD PRESSURE: 140 MMHG | RESPIRATION RATE: 16 BRPM | HEART RATE: 65 BPM | DIASTOLIC BLOOD PRESSURE: 79 MMHG | BODY MASS INDEX: 23.52 KG/M2 | OXYGEN SATURATION: 95 %

## 2022-12-09 DIAGNOSIS — K63.89 CECUM MASS: ICD-10-CM

## 2022-12-09 DIAGNOSIS — K38.8 MASS OF APPENDIX: ICD-10-CM

## 2022-12-09 DIAGNOSIS — Z01.818 PREOP TESTING: Primary | ICD-10-CM

## 2022-12-09 LAB
ABO/RH: NORMAL
ANTIBODY SCREEN: NORMAL

## 2022-12-09 PROCEDURE — 2500000003 HC RX 250 WO HCPCS: Performed by: SURGERY

## 2022-12-09 PROCEDURE — 7100000000 HC PACU RECOVERY - FIRST 15 MIN: Performed by: SURGERY

## 2022-12-09 PROCEDURE — 7100000011 HC PHASE II RECOVERY - ADDTL 15 MIN: Performed by: SURGERY

## 2022-12-09 PROCEDURE — A4217 STERILE WATER/SALINE, 500 ML: HCPCS | Performed by: SURGERY

## 2022-12-09 PROCEDURE — 2720000010 HC SURG SUPPLY STERILE: Performed by: SURGERY

## 2022-12-09 PROCEDURE — 3700000000 HC ANESTHESIA ATTENDED CARE: Performed by: SURGERY

## 2022-12-09 PROCEDURE — 2580000003 HC RX 258: Performed by: SURGERY

## 2022-12-09 PROCEDURE — 86901 BLOOD TYPING SEROLOGIC RH(D): CPT

## 2022-12-09 PROCEDURE — 86900 BLOOD TYPING SEROLOGIC ABO: CPT

## 2022-12-09 PROCEDURE — 88304 TISSUE EXAM BY PATHOLOGIST: CPT

## 2022-12-09 PROCEDURE — 6360000002 HC RX W HCPCS: Performed by: NURSE ANESTHETIST, CERTIFIED REGISTERED

## 2022-12-09 PROCEDURE — 2500000003 HC RX 250 WO HCPCS: Performed by: NURSE ANESTHETIST, CERTIFIED REGISTERED

## 2022-12-09 PROCEDURE — 2709999900 HC NON-CHARGEABLE SUPPLY: Performed by: SURGERY

## 2022-12-09 PROCEDURE — 7100000010 HC PHASE II RECOVERY - FIRST 15 MIN: Performed by: SURGERY

## 2022-12-09 PROCEDURE — 3600000014 HC SURGERY LEVEL 4 ADDTL 15MIN: Performed by: SURGERY

## 2022-12-09 PROCEDURE — 6370000000 HC RX 637 (ALT 250 FOR IP): Performed by: ANESTHESIOLOGY

## 2022-12-09 PROCEDURE — 7100000001 HC PACU RECOVERY - ADDTL 15 MIN: Performed by: SURGERY

## 2022-12-09 PROCEDURE — 86850 RBC ANTIBODY SCREEN: CPT

## 2022-12-09 PROCEDURE — 3600000004 HC SURGERY LEVEL 4 BASE: Performed by: SURGERY

## 2022-12-09 PROCEDURE — 6360000002 HC RX W HCPCS: Performed by: SURGERY

## 2022-12-09 PROCEDURE — 88313 SPECIAL STAINS GROUP 2: CPT

## 2022-12-09 PROCEDURE — 6370000000 HC RX 637 (ALT 250 FOR IP): Performed by: SURGERY

## 2022-12-09 PROCEDURE — 44970 LAPAROSCOPY APPENDECTOMY: CPT | Performed by: SURGERY

## 2022-12-09 PROCEDURE — 36415 COLL VENOUS BLD VENIPUNCTURE: CPT

## 2022-12-09 PROCEDURE — 3700000001 HC ADD 15 MINUTES (ANESTHESIA): Performed by: SURGERY

## 2022-12-09 RX ORDER — SUCCINYLCHOLINE/SOD CL,ISO/PF 100 MG/5ML
SYRINGE (ML) INTRAVENOUS PRN
Status: DISCONTINUED | OUTPATIENT
Start: 2022-12-09 | End: 2022-12-09 | Stop reason: SDUPTHER

## 2022-12-09 RX ORDER — MAGNESIUM HYDROXIDE 1200 MG/15ML
LIQUID ORAL CONTINUOUS PRN
Status: DISCONTINUED | OUTPATIENT
Start: 2022-12-09 | End: 2022-12-09 | Stop reason: HOSPADM

## 2022-12-09 RX ORDER — KETAMINE HYDROCHLORIDE 10 MG/ML
INJECTION, SOLUTION INTRAMUSCULAR; INTRAVENOUS PRN
Status: DISCONTINUED | OUTPATIENT
Start: 2022-12-09 | End: 2022-12-09 | Stop reason: SDUPTHER

## 2022-12-09 RX ORDER — SODIUM CHLORIDE 9 MG/ML
INJECTION, SOLUTION INTRAVENOUS PRN
Status: DISCONTINUED | OUTPATIENT
Start: 2022-12-09 | End: 2022-12-09 | Stop reason: HOSPADM

## 2022-12-09 RX ORDER — DEXMEDETOMIDINE HYDROCHLORIDE 100 UG/ML
INJECTION, SOLUTION INTRAVENOUS PRN
Status: DISCONTINUED | OUTPATIENT
Start: 2022-12-09 | End: 2022-12-09 | Stop reason: SDUPTHER

## 2022-12-09 RX ORDER — SODIUM CHLORIDE 0.9 % (FLUSH) 0.9 %
5-40 SYRINGE (ML) INJECTION PRN
Status: DISCONTINUED | OUTPATIENT
Start: 2022-12-09 | End: 2022-12-09 | Stop reason: HOSPADM

## 2022-12-09 RX ORDER — VECURONIUM BROMIDE 1 MG/ML
INJECTION, POWDER, LYOPHILIZED, FOR SOLUTION INTRAVENOUS PRN
Status: DISCONTINUED | OUTPATIENT
Start: 2022-12-09 | End: 2022-12-09 | Stop reason: SDUPTHER

## 2022-12-09 RX ORDER — HYDROMORPHONE HCL 110MG/55ML
0.5 PATIENT CONTROLLED ANALGESIA SYRINGE INTRAVENOUS EVERY 5 MIN PRN
Status: DISCONTINUED | OUTPATIENT
Start: 2022-12-09 | End: 2022-12-09 | Stop reason: HOSPADM

## 2022-12-09 RX ORDER — LIDOCAINE HYDROCHLORIDE 20 MG/ML
INJECTION, SOLUTION EPIDURAL; INFILTRATION; INTRACAUDAL; PERINEURAL PRN
Status: DISCONTINUED | OUTPATIENT
Start: 2022-12-09 | End: 2022-12-09 | Stop reason: SDUPTHER

## 2022-12-09 RX ORDER — FENTANYL CITRATE 50 UG/ML
INJECTION, SOLUTION INTRAMUSCULAR; INTRAVENOUS PRN
Status: DISCONTINUED | OUTPATIENT
Start: 2022-12-09 | End: 2022-12-09 | Stop reason: SDUPTHER

## 2022-12-09 RX ORDER — DEXAMETHASONE SODIUM PHOSPHATE 4 MG/ML
INJECTION, SOLUTION INTRA-ARTICULAR; INTRALESIONAL; INTRAMUSCULAR; INTRAVENOUS; SOFT TISSUE PRN
Status: DISCONTINUED | OUTPATIENT
Start: 2022-12-09 | End: 2022-12-09 | Stop reason: SDUPTHER

## 2022-12-09 RX ORDER — MEPERIDINE HYDROCHLORIDE 25 MG/ML
12.5 INJECTION INTRAMUSCULAR; INTRAVENOUS; SUBCUTANEOUS EVERY 5 MIN PRN
Status: DISCONTINUED | OUTPATIENT
Start: 2022-12-09 | End: 2022-12-09 | Stop reason: HOSPADM

## 2022-12-09 RX ORDER — METRONIDAZOLE 500 MG/100ML
500 INJECTION, SOLUTION INTRAVENOUS EVERY 8 HOURS
Status: DISCONTINUED | OUTPATIENT
Start: 2022-12-09 | End: 2022-12-09 | Stop reason: HOSPADM

## 2022-12-09 RX ORDER — BUPIVACAINE HYDROCHLORIDE AND EPINEPHRINE 5; 5 MG/ML; UG/ML
INJECTION, SOLUTION EPIDURAL; INTRACAUDAL; PERINEURAL
Status: COMPLETED | OUTPATIENT
Start: 2022-12-09 | End: 2022-12-09

## 2022-12-09 RX ORDER — OXYCODONE HYDROCHLORIDE 5 MG/1
5 TABLET ORAL ONCE
Status: COMPLETED | OUTPATIENT
Start: 2022-12-09 | End: 2022-12-09

## 2022-12-09 RX ORDER — SODIUM CHLORIDE, SODIUM LACTATE, POTASSIUM CHLORIDE, CALCIUM CHLORIDE 600; 310; 30; 20 MG/100ML; MG/100ML; MG/100ML; MG/100ML
INJECTION, SOLUTION INTRAVENOUS CONTINUOUS
Status: DISCONTINUED | OUTPATIENT
Start: 2022-12-09 | End: 2022-12-09 | Stop reason: HOSPADM

## 2022-12-09 RX ORDER — SODIUM CHLORIDE 0.9 % (FLUSH) 0.9 %
5-40 SYRINGE (ML) INJECTION EVERY 12 HOURS SCHEDULED
Status: DISCONTINUED | OUTPATIENT
Start: 2022-12-09 | End: 2022-12-09 | Stop reason: HOSPADM

## 2022-12-09 RX ORDER — GLYCOPYRROLATE 0.2 MG/ML
INJECTION INTRAMUSCULAR; INTRAVENOUS PRN
Status: DISCONTINUED | OUTPATIENT
Start: 2022-12-09 | End: 2022-12-09 | Stop reason: SDUPTHER

## 2022-12-09 RX ORDER — PROPOFOL 10 MG/ML
INJECTION, EMULSION INTRAVENOUS PRN
Status: DISCONTINUED | OUTPATIENT
Start: 2022-12-09 | End: 2022-12-09 | Stop reason: SDUPTHER

## 2022-12-09 RX ORDER — CIPROFLOXACIN 2 MG/ML
400 INJECTION, SOLUTION INTRAVENOUS EVERY 12 HOURS
Status: DISCONTINUED | OUTPATIENT
Start: 2022-12-09 | End: 2022-12-09 | Stop reason: HOSPADM

## 2022-12-09 RX ORDER — ONDANSETRON 2 MG/ML
INJECTION INTRAMUSCULAR; INTRAVENOUS PRN
Status: DISCONTINUED | OUTPATIENT
Start: 2022-12-09 | End: 2022-12-09 | Stop reason: SDUPTHER

## 2022-12-09 RX ORDER — HYDROCODONE BITARTRATE AND ACETAMINOPHEN 5; 325 MG/1; MG/1
1 TABLET ORAL EVERY 4 HOURS PRN
Qty: 15 TABLET | Refills: 0 | Status: SHIPPED | OUTPATIENT
Start: 2022-12-09 | End: 2022-12-16

## 2022-12-09 RX ORDER — ONDANSETRON 2 MG/ML
4 INJECTION INTRAMUSCULAR; INTRAVENOUS
Status: DISCONTINUED | OUTPATIENT
Start: 2022-12-09 | End: 2022-12-09 | Stop reason: HOSPADM

## 2022-12-09 RX ADMIN — OXYCODONE 5 MG: 5 TABLET ORAL at 13:42

## 2022-12-09 RX ADMIN — FENTANYL CITRATE 50 MCG: 50 INJECTION, SOLUTION INTRAMUSCULAR; INTRAVENOUS at 12:47

## 2022-12-09 RX ADMIN — CIPROFLOXACIN 400 MG: 2 INJECTION, SOLUTION INTRAVENOUS at 11:47

## 2022-12-09 RX ADMIN — KETAMINE HYDROCHLORIDE 20 MG: 10 INJECTION, SOLUTION INTRAMUSCULAR; INTRAVENOUS at 12:34

## 2022-12-09 RX ADMIN — Medication 100 MG: at 11:39

## 2022-12-09 RX ADMIN — DEXMEDETOMIDINE HYDROCHLORIDE 10 MCG: 100 INJECTION, SOLUTION INTRAVENOUS at 12:27

## 2022-12-09 RX ADMIN — PROPOFOL 140 MG: 10 INJECTION, EMULSION INTRAVENOUS at 11:38

## 2022-12-09 RX ADMIN — ONDANSETRON 4 MG: 2 INJECTION INTRAMUSCULAR; INTRAVENOUS at 11:52

## 2022-12-09 RX ADMIN — METRONIDAZOLE 500 MG: 500 INJECTION, SOLUTION INTRAVENOUS at 11:36

## 2022-12-09 RX ADMIN — DEXAMETHASONE SODIUM PHOSPHATE 8 MG: 4 INJECTION, SOLUTION INTRAMUSCULAR; INTRAVENOUS at 11:52

## 2022-12-09 RX ADMIN — VECURONIUM BROMIDE 10 MG: 1 INJECTION, POWDER, LYOPHILIZED, FOR SOLUTION INTRAVENOUS at 11:52

## 2022-12-09 RX ADMIN — GLYCOPYRROLATE 0.2 MG: 0.2 INJECTION, SOLUTION INTRAMUSCULAR; INTRAVENOUS at 11:35

## 2022-12-09 RX ADMIN — KETAMINE HYDROCHLORIDE 10 MG: 10 INJECTION, SOLUTION INTRAMUSCULAR; INTRAVENOUS at 12:21

## 2022-12-09 RX ADMIN — FENTANYL CITRATE 50 MCG: 50 INJECTION, SOLUTION INTRAMUSCULAR; INTRAVENOUS at 11:38

## 2022-12-09 RX ADMIN — KETAMINE HYDROCHLORIDE 20 MG: 10 INJECTION, SOLUTION INTRAMUSCULAR; INTRAVENOUS at 12:08

## 2022-12-09 RX ADMIN — SODIUM CHLORIDE, POTASSIUM CHLORIDE, SODIUM LACTATE AND CALCIUM CHLORIDE: 600; 310; 30; 20 INJECTION, SOLUTION INTRAVENOUS at 10:29

## 2022-12-09 RX ADMIN — LIDOCAINE HYDROCHLORIDE 100 MG: 20 INJECTION, SOLUTION EPIDURAL; INFILTRATION; INTRACAUDAL; PERINEURAL at 11:38

## 2022-12-09 RX ADMIN — DEXMEDETOMIDINE HYDROCHLORIDE 10 MCG: 100 INJECTION, SOLUTION INTRAVENOUS at 11:57

## 2022-12-09 RX ADMIN — PROPOFOL 60 MG: 10 INJECTION, EMULSION INTRAVENOUS at 12:04

## 2022-12-09 RX ADMIN — SUGAMMADEX 200 MG: 100 INJECTION, SOLUTION INTRAVENOUS at 12:36

## 2022-12-09 ASSESSMENT — PAIN SCALES - GENERAL: PAINLEVEL_OUTOF10: 3

## 2022-12-09 ASSESSMENT — PAIN - FUNCTIONAL ASSESSMENT: PAIN_FUNCTIONAL_ASSESSMENT: NONE - DENIES PAIN

## 2022-12-09 NOTE — DISCHARGE INSTRUCTIONS
Uziel Garner M.D.    [phone] [address] [facility] [address] [facility] [address], [address]      POST-OPERATIVE INSTRUCTIONS FOR APPENDIX SURGERY    Call the office to schedule your post-operative appointment with your surgeon for two (2) weeks. You will have surgical glue closing your incisions. Your incisions are waterproof. You may shower. Wash incisions gently, and pat them dry. Do not rub your incisions. General guidelines for activity:   Avoid strenuous activity or lifting anything heavier than 15 pounds. It is OK to be up  walking around, and walking up and down stairs. Do what is comfortable: stop and rest when you feel tired. Drink plenty of fluids and stay on a bland diet for 2-3 days after surgery. Do not drive for three days and while taking your narcotic pain medicine. Watch for signs of infection:  Excessive warmth or bright redness around your incisions  Leakage of bloody or cloudy fluid from you incisions  Fever over 100.5    During the laparoscopic procedure that you had, gas is pumped into the abdominal cavity. You may feel abdominal, shoulder, or rib pain for a few days due to this gas. You will have pain medicine ordered. Take as directed    If you experience constipation:  Increase your water intake  Increase your activity, walking is best.  A stool softener or mild laxative may be necessary if you still have not had a bowel movement ; call the office for further instructions. ANESTHESIA DISCHARGE INSTRUCTIONS    Wear your seatbelt home. You are under the influence of drugs-do not drink alcohol, drive, operate machinery, make any important decisions or sign any legal documents for 24 hours. Children should not ride bikes, [place] or play on gym sets for 24 hours after surgery.   A responsible adult needs to be with you for 24 hours. You may experience lightheadedness, dizziness, or sleepiness following surgery. Rest at home today- increase activity as tolerated. Progress slowly to a regular diet unless your physician has instructed you otherwise. Drink plenty of water. If persistent nausea and vomiting becomes a problem, call your physician. Coughing, sore throat and muscle aches are other side effects of anesthesia, and should improve with time. Do not drive or operate machinery while taking narcotics. Females of childbearing potential and on hormonal birth control, should use two forms of contraception following procedure if given a medication called sugammadex and/or emend. Additional contraception should be used for 7 days for sugammadex and/or 28 days for emend. These medications have a potential to reduce the effectiveness of hormonal birth control.

## 2022-12-09 NOTE — BRIEF OP NOTE
Brief Postoperative Note      Patient: Pritesh Jules  YOB: 1955  MRN: [id number]    Date of Procedure: 12/9/2022    Pre-Op Diagnosis: K38.9 APPENDIX MASS  K63.89 CECAL MASS    Post-Op Diagnosis: Same       Procedure(s):  LAPAROSCOPIC APPENDECTOMY    Surgeon(s):  Alessandro Flood MD    Assistant:  Surgical Assistant: Lissett Santos    Anesthesia: General    Estimated Blood Loss (mL): Minimal    Complications: None    Specimens:   ID Type Source Tests Collected by Time Destination   A : A) APENDIX Tissue Appendix SURGICAL PATHOLOGY Alessandro Flood MD 12/9/2022 1234        Implants:  * No implants in log *      Drains: * No LDAs found *    Findings: Thickening and mass at distal appendix densely adherent into lateral aspect of cecum. Was removed. Cecum appeared normal otherwise. Pathology pending.     Electronically signed by Alessandro Flood MD on 12/9/2022 at 12:39 PM

## 2022-12-09 NOTE — ANESTHESIA PRE PROCEDURE
Department of Anesthesiology  Preprocedure Note       Name:  Jarret Wilson   Age:  75 y.o.  :  1955                                          MRN:  [id number]         Date:  2022      Surgeon: Cassie Nielsen):  Maryanne Portillo MD    Procedure: Procedure(s):  LAPAROSCOPIC APPENDECTOMY  POSSIBLE LAPAROSCOPIC ILEOCECECTOMY    Medications prior to admission:   Prior to Admission medications    Medication Sig Start Date End Date Taking? Authorizing Provider   Cholecalciferol (VITAMIN D) 50 MCG (2000) CAPS capsule Take by mouth daily    Historical Provider, MD   atorvastatin (LIPITOR) 20 MG tablet Take 1 tablet by mouth daily 22   Lucas Bailey MD   hydroCHLOROthiazide (HYDRODIURIL) 25 MG tablet TAKE 1 TABLET BY MOUTH DAILY. 22   Lucas Bailey MD   losartan (COZAAR) 50 MG tablet TAKE 1 TABLET DAILY  Patient taking differently: 100 mg TAKE 1 TABLET DAILY 22   Lucas Bailey MD   omeprazole (PRILOSEC) 20 MG delayed release capsule Take 1 capsule by mouth every morning (before breakfast) 22   Lucas Bailey MD   aspirin 81 MG tablet Take 81 mg by mouth daily. Historical Provider, MD       Current medications:    No current outpatient medications on file. No current facility-administered medications for this visit. Allergies:     Allergies   Allergen Reactions    Chantix [Varenicline]      Pananoia       Problem List:    Patient Active Problem List   Diagnosis Code    Personal history of tobacco use Z87.891    Radiculopathy, lumbar region M54.16    Hypertension I10    PVD (peripheral vascular disease) ([facility] I73.9    Colon polyp K63.5    Mixed hyperlipidemia E78.2    Degeneration of cervical intervertebral disc M50.30    Degeneration of lumbar or lumbosacral intervertebral disc M51.37    Low back pain M54.50    Vitamin D deficiency E55.9    Impaired fasting blood sugar R73.01    Gastroesophageal reflux disease without esophagitis K21.9    Encounter to establish care with new doctor Z76.89       Past Medical History:        Diagnosis Date    Colon polyp     GERD (gastroesophageal reflux disease)     Hyperlipidemia     Hypertension     PVD (peripheral vascular disease) ([address]        Past Surgical History:        Procedure Laterality Date    BACK SURGERY  2013    Dr. Andrea García COLONOSCOPY N/A 2022    COLONOSCOPY DIAGNOSTIC performed by Guy Dunn MD at [address]  posterior right ear 1980    OTHER SURGICAL HISTORY Bilateral 2013    Dr. Blanc First; Iliac stents    TONSILLECTOMY         Social History:    Social History     Tobacco Use    Smoking status: Former     Packs/day: 0.75     Years: 45.00     Pack years: 33.75     Types: Cigarettes     Quit date: 2018     Years since quittin.3    Smokeless tobacco: Current     Types: Chew   Substance Use Topics    Alcohol use: Yes     Alcohol/week: 14.0 standard drinks     Types: 14 Shots of liquor per week     Comment: 2 drinks every other day                                Ready to quit: Not Answered  Counseling given: Not Answered      Vital Signs (Current): There were no vitals filed for this visit.                                            BP Readings from Last 3 Encounters:   22 (!) 158/65   22 129/75   22 138/69       NPO Status:                                                                                 BMI:   Wt Readings from Last 3 Encounters:   22 180 lb (81.6 kg)   22 172 lb (78 kg)   22 180 lb (81.6 kg)     There is no height or weight on file to calculate BMI.    CBC:   Lab Results   Component Value Date/Time    WBC 4.6 2022 09:44 AM    RBC 3.74 2022 09:44 AM    HGB 12.5 2022 09:44 AM    HCT 37.4 2022 09:44 AM    MCV 99.9 2022 09:44 AM    RDW 13.0 2022 09:44 AM     2022 09:44 AM       CMP:   Lab Results   Component Value Date/Time     12/05/2022 09:44 AM    K 5.0 12/05/2022 09:44 AM    CL 98 12/05/2022 09:44 AM    CO2 26 12/05/2022 09:44 AM    BUN 25 12/05/2022 09:44 AM    CREATININE 1.2 12/05/2022 09:44 AM    GFRAA >60 08/18/2022 11:02 AM    AGRATIO 1.8 08/18/2022 11:02 AM    LABGLOM >60 12/05/2022 09:44 AM    GLUCOSE 105 12/05/2022 09:44 AM    PROT 6.7 08/18/2022 11:02 AM    CALCIUM 9.0 12/05/2022 09:44 AM    BILITOT 0.4 08/18/2022 11:02 AM    ALKPHOS 53 08/18/2022 11:02 AM    AST 23 08/18/2022 11:02 AM    ALT 12 08/18/2022 11:02 AM       POC Tests: No results for input(s): POCGLU, POCNA, POCK, POCCL, POCBUN, POCHEMO, POCHCT in the last 72 hours. Coags:   Lab Results   Component Value Date/Time    PROTIME 10.2 04/01/2013 01:34 PM    INR 0.89 04/01/2013 01:34 PM    APTT 26.1 04/01/2013 01:34 PM       HCG (If Applicable): No results found for: PREGTESTUR, PREGSERUM, HCG, HCGQUANT     ABGs: No results found for: PHART, PO2ART, NYZ1LSI, WDD3YKQ, BEART, V5WZFNKL     Type & Screen (If Applicable):  No results found for: LABABO, LABRH    Drug/Infectious Status (If Applicable):  Lab Results   Component Value Date/Time    HEPCAB NON-REACTIVE 04/09/2019 12:32 PM       COVID-19 Screening (If Applicable): No results found for: COVID19        Anesthesia Evaluation  Patient summary reviewed and Nursing notes reviewed  Airway: Mallampati: II          Dental:          Pulmonary:                              Cardiovascular:    (+) hypertension:,                   Neuro/Psych:   (+) neuromuscular disease:,             GI/Hepatic/Renal:   (+) GERD:,           Endo/Other:                     Abdominal:             Vascular:           Other Findings:             Anesthesia Plan      general     ASA 3                                   Marita Avalos MD   12/9/2022

## 2022-12-09 NOTE — PROGRESS NOTES
Pt arrived to PACU from OR, VSS, pt arouses to voice. Laparoscopic incision sites x3 are CDI, ice applied. Will continue to monitor.

## 2022-12-09 NOTE — ANESTHESIA POSTPROCEDURE EVALUATION
Department of Anesthesiology  Postprocedure Note    Patient: Jose Leggett  MRN: [id number]  YOB: 1955  Date of evaluation: 12/9/2022      Procedure Summary     Date: 12/09/22 Room / Location: [address]    Anesthesia Start: 4440 Anesthesia Stop:     Procedure: LAPAROSCOPIC APPENDECTOMY (Abdomen) Diagnosis:       Mass of appendix      Cecum mass      (K38.9 APPENDIX MASS)      (K63.89 CECAL MASS)    Surgeons: Ana Lamb MD Responsible Provider: Altagracia Segovia MD    Anesthesia Type: general ASA Status: 3          Anesthesia Type: No value filed.     Eitan Phase I: Eitan Score: 10    Eitan Phase II:        Anesthesia Post Evaluation    Patient location during evaluation: PACU  Patient participation: complete - patient participated  Level of consciousness: awake  Airway patency: patent  Nausea & Vomiting: no vomiting and no nausea  Complications: no  Cardiovascular status: hemodynamically stable  Respiratory status: acceptable  Hydration status: stable  Multimodal analgesia pain management approach

## 2022-12-10 NOTE — OP NOTE
[facility]                     [address] [facility], [address] [place] [address]                                OPERATIVE REPORT    PATIENT NAME: Jet Reich                  :        1955  MED REC NO:   [id number]                          ROOM:  ACCOUNT NO:   [de-identified]                           ADMIT DATE: 2022  PROVIDER:     Noa Rodriguez MD    DATE OF PROCEDURE:  2022    PREOPERATIVE DIAGNOSIS:  Appendiceal mass versus cecal mass. POSTOPERATIVE DIAGNOSES:  Appendiceal mass versus cecal mass with  appendiceal mass noted. PROCEDURE:  Laparoscopic appendectomy. SURGEON:  Noa Rodriguez MD    ANESTHESIA:  General plus local.    ESTIMATED BLOOD LOSS:  Minimal.    COMPLICATIONS:  None. DETAILS OF SURGERY:  The patient is a 67-year-old gentleman presenting  for surgery today. He had a screening colonoscopy noting a submucosal  or extrinsic compression in the cecum without an internal mass mucosal  lesion noted at that site. The patient had a followup CT scan showing a  mass of the cecum or possibly the appendix in this region. The patient  presents for laparoscopic evaluation and treatment today. FINDINGS:  In terms of findings at surgery, the appendix was normal at  the base but then turned laterally and was densely adhered with  surrounding thickened fat and thickening of the appendix itself in the  distal half. This was stuck into the lateral aspect of the cecum and  densely adhered in this area. I felt this did represent the abnormality  noted on imaging and the prior colonoscopy. The appendix was removed  without incident and sent for pathology. I felt the cecum was normal,  otherwise. Pathology is pending at this time. ADDITIONAL DETAILS OF SURGERY:  The patient was brought to the operating  room, placed on the operative table in supine position. Compression  boots were placed. General anesthetic was administered. The abdomen  was prepped and draped sterilely and time-out was done. A  supraumbilical 5-mm direct entry view port was placed and the abdominal  cavity was insufflated 15 mmHg pressure. Lower abdominal 5-mm port and  left lateral abdominal 12-mm port were placed under direct vision. The  intra-abdominal cavity was examined in the right lower quadrant. The  ileum, cecum and appendix were all identified and visualized. The right  side of the patient was elevated. The appendix was noted to be densely  adherent into the lateral aspect of the cecum, going up the lateral  aspect of the right colon and I felt this was the abnormal area noted on  imaging. The appendix-cecal base was dissected gradually and staple  transected with the [place] 60-mm stapler with the gold load. We then  used this area of the appendix as a handle and elevated this up off the  cecum and  this area gently, taking the mesentery down with the  LigaSure [place] and teasing the tissue planes apart, removing the  appendix and the fatty tissue around the appendix and mesoappendix with  it without incident. This was placed in a specimen retrieval sac and  removed out of the left lateral abdominal port site within the bag. The  pericecal area was inspected. There was slight ooze from the  mobilization of the right colon and this was coagulated with the  LigaSure and Surgicel gauze was placed in this site. The staple line  appeared hemostatic and intact. The instruments and ports were removed. Port sites appeared hemostatic. Fascia at the 12-mm site was closed  with an 0 Vicryl figure-of-eight suture. Skin at all sites closed with  4-0 Monocryl suture. Dermabond glue was placed. The patient was taken  to recovery room in stable condition postop.         Matty Arce MD    D: 12/09/2022 12:57:29       T: 12/09/2022 13:01:51     [email or web address]  Job#: [id number]     Doc#: [id number]    CC:  Dr. Candelaria Cordero, Primary Care Doctor Hortencia Oliveros MD

## 2022-12-22 ENCOUNTER — OFFICE VISIT (OUTPATIENT)
Dept: SURGERY | Age: 67
End: 2022-12-22

## 2022-12-22 VITALS — WEIGHT: 175 LBS | BODY MASS INDEX: 24.41 KG/M2 | SYSTOLIC BLOOD PRESSURE: 136 MMHG | DIASTOLIC BLOOD PRESSURE: 69 MMHG

## 2022-12-22 DIAGNOSIS — K63.89 CECUM MASS: Primary | ICD-10-CM

## 2022-12-22 PROCEDURE — 99024 POSTOP FOLLOW-UP VISIT: CPT | Performed by: SURGERY

## 2022-12-22 NOTE — PROGRESS NOTES
[facility] GENERAL AND LAPAROSCOPIC SURGERY          PATIENT NAME: Cynthia Balderas     TODAY'S DATE: 12/22/2022    SUBJECTIVE:    Pt doing well post op, no issues at home with recovery. OBJECTIVE:  VITALS:  Wt 175 lb (79.4 kg)   BMI 24.41 kg/m²     CONSTITUTIONAL:  awake and alert  LUNGS:  clear to auscultation  ABDOMEN:  normal bowel sounds, soft, non-distended, non-tender, incisions are healed     Data:    PATHOLOGY  FINAL DIAGNOSIS:     Appendix, appendectomy:   - Appendix with mucosal acute inflammation and focal az-appendiceal   stromal fibrosis- See Comment.   - Negative for malignancy. COMMENT: The specimen was entirely submitted for microscopic examination.        JINMI/JINMI     ASSESSMENT AND PLAN:  S/P Lap appy - doing well  Path reviewed  Pt had some pain over a couple days last summer, likely had missed appendicitis  Path and lap view of anatomy should explain scope and CT findings  No further treatment at this time needed  Have recommended to pt that he follow up with Dr. Sahil Rizzo for a deborah scope in a year  See me farrah Bernal MD

## 2023-01-15 DIAGNOSIS — I10 PRIMARY HYPERTENSION: ICD-10-CM

## 2023-01-15 DIAGNOSIS — K21.9 GASTROESOPHAGEAL REFLUX DISEASE WITHOUT ESOPHAGITIS: ICD-10-CM

## 2023-01-16 RX ORDER — OMEPRAZOLE 20 MG/1
CAPSULE, DELAYED RELEASE ORAL
Qty: 90 CAPSULE | Refills: 1 | Status: SHIPPED | OUTPATIENT
Start: 2023-01-16 | End: 2023-02-17 | Stop reason: SDUPTHER

## 2023-01-16 RX ORDER — HYDROCHLOROTHIAZIDE 25 MG/1
TABLET ORAL
Qty: 90 TABLET | Refills: 1 | Status: SHIPPED | OUTPATIENT
Start: 2023-01-16 | End: 2023-02-17 | Stop reason: SDUPTHER

## 2023-02-06 DIAGNOSIS — I10 PRIMARY HYPERTENSION: ICD-10-CM

## 2023-02-06 DIAGNOSIS — I73.9 PVD (PERIPHERAL VASCULAR DISEASE) (HCC): ICD-10-CM

## 2023-02-06 RX ORDER — ATORVASTATIN CALCIUM 20 MG/1
TABLET, FILM COATED ORAL
Qty: 90 TABLET | Refills: 1 | OUTPATIENT
Start: 2023-02-06

## 2023-02-06 RX ORDER — LOSARTAN POTASSIUM 50 MG/1
TABLET ORAL
Qty: 90 TABLET | Refills: 1 | OUTPATIENT
Start: 2023-02-06

## 2023-02-06 NOTE — TELEPHONE ENCOUNTER
Patient states that he can wait until his appt for refills on both medications. They are on automatic refill, which is why the pharmacy sent them over.

## 2023-02-17 ENCOUNTER — OFFICE VISIT (OUTPATIENT)
Dept: INTERNAL MEDICINE CLINIC | Age: 68
End: 2023-02-17

## 2023-02-17 VITALS
WEIGHT: 180 LBS | SYSTOLIC BLOOD PRESSURE: 138 MMHG | DIASTOLIC BLOOD PRESSURE: 60 MMHG | HEIGHT: 71 IN | BODY MASS INDEX: 25.2 KG/M2 | HEART RATE: 84 BPM | OXYGEN SATURATION: 98 %

## 2023-02-17 DIAGNOSIS — E78.2 MIXED HYPERLIPIDEMIA: ICD-10-CM

## 2023-02-17 DIAGNOSIS — Z12.5 PROSTATE CANCER SCREENING: ICD-10-CM

## 2023-02-17 DIAGNOSIS — Z00.00 MEDICARE ANNUAL WELLNESS VISIT, SUBSEQUENT: Primary | ICD-10-CM

## 2023-02-17 DIAGNOSIS — N28.9 ABNORMAL KIDNEY FUNCTION: ICD-10-CM

## 2023-02-17 DIAGNOSIS — M54.12 CERVICAL RADICULOPATHY: ICD-10-CM

## 2023-02-17 DIAGNOSIS — R73.01 IMPAIRED FASTING BLOOD SUGAR: ICD-10-CM

## 2023-02-17 DIAGNOSIS — I10 PRIMARY HYPERTENSION: Primary | ICD-10-CM

## 2023-02-17 DIAGNOSIS — I10 PRIMARY HYPERTENSION: ICD-10-CM

## 2023-02-17 DIAGNOSIS — K21.9 GASTROESOPHAGEAL REFLUX DISEASE WITHOUT ESOPHAGITIS: ICD-10-CM

## 2023-02-17 DIAGNOSIS — I73.9 PVD (PERIPHERAL VASCULAR DISEASE) (HCC): ICD-10-CM

## 2023-02-17 PROBLEM — Z76.89 ENCOUNTER TO ESTABLISH CARE WITH NEW DOCTOR: Status: RESOLVED | Noted: 2022-08-18 | Resolved: 2023-02-17

## 2023-02-17 LAB
A/G RATIO: 1.7 (ref 1.1–2.2)
ALBUMIN SERPL-MCNC: 4.1 G/DL (ref 3.4–5)
ALP BLD-CCNC: 63 U/L (ref 40–129)
ALT SERPL-CCNC: 11 U/L (ref 10–40)
ANION GAP SERPL CALCULATED.3IONS-SCNC: 14 MMOL/L (ref 3–16)
AST SERPL-CCNC: 24 U/L (ref 15–37)
BILIRUB SERPL-MCNC: 0.4 MG/DL (ref 0–1)
BUN BLDV-MCNC: 27 MG/DL (ref 7–20)
CALCIUM SERPL-MCNC: 9.1 MG/DL (ref 8.3–10.6)
CHLORIDE BLD-SCNC: 99 MMOL/L (ref 99–110)
CHOLESTEROL, TOTAL: 151 MG/DL (ref 0–199)
CO2: 25 MMOL/L (ref 21–32)
CREAT SERPL-MCNC: 1.5 MG/DL (ref 0.8–1.3)
GFR SERPL CREATININE-BSD FRML MDRD: 50 ML/MIN/{1.73_M2}
GLUCOSE BLD-MCNC: 100 MG/DL (ref 70–99)
HCT VFR BLD CALC: 37.1 % (ref 40.5–52.5)
HDLC SERPL-MCNC: 71 MG/DL (ref 40–60)
HEMOGLOBIN: 12.3 G/DL (ref 13.5–17.5)
LDL CHOLESTEROL CALCULATED: 65 MG/DL
MCH RBC QN AUTO: 32.8 PG (ref 26–34)
MCHC RBC AUTO-ENTMCNC: 33.3 G/DL (ref 31–36)
MCV RBC AUTO: 98.5 FL (ref 80–100)
PDW BLD-RTO: 13.8 % (ref 12.4–15.4)
PLATELET # BLD: 243 K/UL (ref 135–450)
PMV BLD AUTO: 9.1 FL (ref 5–10.5)
POTASSIUM SERPL-SCNC: 4 MMOL/L (ref 3.5–5.1)
PROSTATE SPECIFIC ANTIGEN: 2.29 NG/ML (ref 0–4)
RBC # BLD: 3.76 M/UL (ref 4.2–5.9)
SODIUM BLD-SCNC: 138 MMOL/L (ref 136–145)
TOTAL PROTEIN: 6.5 G/DL (ref 6.4–8.2)
TRIGL SERPL-MCNC: 74 MG/DL (ref 0–150)
VLDLC SERPL CALC-MCNC: 15 MG/DL
WBC # BLD: 6.7 K/UL (ref 4–11)

## 2023-02-17 RX ORDER — ATORVASTATIN CALCIUM 20 MG/1
20 TABLET, FILM COATED ORAL DAILY
Qty: 90 TABLET | Refills: 1 | Status: SHIPPED | OUTPATIENT
Start: 2023-02-17

## 2023-02-17 RX ORDER — LOSARTAN POTASSIUM 50 MG/1
TABLET ORAL
Qty: 90 TABLET | Refills: 1 | Status: SHIPPED | OUTPATIENT
Start: 2023-02-17

## 2023-02-17 RX ORDER — OMEPRAZOLE 20 MG/1
CAPSULE, DELAYED RELEASE ORAL
Qty: 90 CAPSULE | Refills: 1 | Status: SHIPPED | OUTPATIENT
Start: 2023-02-17

## 2023-02-17 RX ORDER — HYDROCHLOROTHIAZIDE 25 MG/1
TABLET ORAL
Qty: 90 TABLET | Refills: 1 | Status: SHIPPED | OUTPATIENT
Start: 2023-02-17

## 2023-02-17 SDOH — ECONOMIC STABILITY: FOOD INSECURITY: WITHIN THE PAST 12 MONTHS, THE FOOD YOU BOUGHT JUST DIDN'T LAST AND YOU DIDN'T HAVE MONEY TO GET MORE.: NEVER TRUE

## 2023-02-17 SDOH — ECONOMIC STABILITY: HOUSING INSECURITY
IN THE LAST 12 MONTHS, WAS THERE A TIME WHEN YOU DID NOT HAVE A STEADY PLACE TO SLEEP OR SLEPT IN A SHELTER (INCLUDING NOW)?: NO

## 2023-02-17 SDOH — ECONOMIC STABILITY: FOOD INSECURITY: WITHIN THE PAST 12 MONTHS, YOU WORRIED THAT YOUR FOOD WOULD RUN OUT BEFORE YOU GOT MONEY TO BUY MORE.: NEVER TRUE

## 2023-02-17 SDOH — ECONOMIC STABILITY: INCOME INSECURITY: HOW HARD IS IT FOR YOU TO PAY FOR THE VERY BASICS LIKE FOOD, HOUSING, MEDICAL CARE, AND HEATING?: NOT HARD AT ALL

## 2023-02-17 ASSESSMENT — LIFESTYLE VARIABLES
HOW OFTEN DURING THE LAST YEAR HAVE YOU HAD A FEELING OF GUILT OR REMORSE AFTER DRINKING: 0
HOW OFTEN DO YOU HAVE A DRINK CONTAINING ALCOHOL: 4 OR MORE TIMES A WEEK
HOW OFTEN DURING THE LAST YEAR HAVE YOU BEEN UNABLE TO REMEMBER WHAT HAPPENED THE NIGHT BEFORE BECAUSE YOU HAD BEEN DRINKING: 0
HOW OFTEN DURING THE LAST YEAR HAVE YOU FOUND THAT YOU WERE NOT ABLE TO STOP DRINKING ONCE YOU HAD STARTED: 0
HOW MANY STANDARD DRINKS CONTAINING ALCOHOL DO YOU HAVE ON A TYPICAL DAY: 3 OR 4
HAS A RELATIVE, FRIEND, DOCTOR, OR ANOTHER HEALTH PROFESSIONAL EXPRESSED CONCERN ABOUT YOUR DRINKING OR SUGGESTED YOU CUT DOWN: 0
HOW OFTEN DURING THE LAST YEAR HAVE YOU NEEDED AN ALCOHOLIC DRINK FIRST THING IN THE MORNING TO GET YOURSELF GOING AFTER A NIGHT OF HEAVY DRINKING: 0
HOW OFTEN DURING THE LAST YEAR HAVE YOU FAILED TO DO WHAT WAS NORMALLY EXPECTED FROM YOU BECAUSE OF DRINKING: 0
HAVE YOU OR SOMEONE ELSE BEEN INJURED AS A RESULT OF YOUR DRINKING: 0

## 2023-02-17 ASSESSMENT — ENCOUNTER SYMPTOMS
SHORTNESS OF BREATH: 0
COLOR CHANGE: 0
VOMITING: 0
BACK PAIN: 0
CONSTIPATION: 0
CHEST TIGHTNESS: 0
ABDOMINAL PAIN: 0
WHEEZING: 0
NAUSEA: 0
SORE THROAT: 0
COUGH: 0

## 2023-02-17 ASSESSMENT — PATIENT HEALTH QUESTIONNAIRE - PHQ9
SUM OF ALL RESPONSES TO PHQ QUESTIONS 1-9: 0
2. FEELING DOWN, DEPRESSED OR HOPELESS: 0
SUM OF ALL RESPONSES TO PHQ QUESTIONS 1-9: 0

## 2023-02-17 NOTE — ASSESSMENT & PLAN NOTE
S/p femoral artery stents in the past, currently asymptomatic, encouraged to continue daily walks, continue statin and aspirin, he no longer smokes but continues to use tobacco dip, encouraged regarding need for complete abstinence from tobacco products

## 2023-02-17 NOTE — ASSESSMENT & PLAN NOTE
Blood pressure stable and well-controlled on current combination of losartan and hydrochlorothiazide, has been doing okay after reducing losartan dose with no more dizzy spells, update labs, reinforced recommendations for low-salt low-fat diet, active lifestyle and discontinue use of tobacco products

## 2023-02-17 NOTE — ASSESSMENT & PLAN NOTE
Update labs and make recommendations accordingly, reinforced recommendation to adhere to low-carb diet with active lifestyle

## 2023-02-17 NOTE — PROGRESS NOTES
Medicare Annual Wellness Visit  Name: Kyler Barnhart Date: 2023   MRN: [id number] Sex: Male   Age: 75 y.o. Ethnicity: Non- / Non    : 1955 Race: White (non-)      Inocente Zhu is here for Medicare AWV     Screenings for behavioral, psychosocial and functional/safety risks, and cognitive dysfunction are all negative except as indicated below. These results, as well as other patient data from the [address] form, are documented in Flowsheets linked to this Encounter. Allergies   Allergen Reactions    Chantix [Varenicline]      Pananoia       Prior to Visit Medications    Medication Sig Taking? Authorizing Provider   omeprazole (PRILOSEC) 20 MG delayed release capsule TAKE 1 Jesse Pathak MD   losartan (COZAAR) 50 MG tablet TAKE 1 TABLET DAILY Yes Tyler Lui MD   hydroCHLOROthiazide (HYDRODIURIL) 25 MG tablet TAKE 1 TABLET DAILY Yes Tyler Lui MD   atorvastatin (LIPITOR) 20 MG tablet Take 1 tablet by mouth daily Yes Tyler Lui MD   Cholecalciferol (VITAMIN D) 50 MCG (2000) CAPS capsule Take by mouth daily Yes Historical Provider, MD   aspirin 81 MG tablet Take 81 mg by mouth daily.  Yes Historical Provider, MD       Past Medical History:   Diagnosis Date    Colon polyp     GERD (gastroesophageal reflux disease)     Hyperlipidemia     Hypertension     PVD (peripheral vascular disease) ([facility] [address]      Past Surgical History:   Procedure Laterality Date    BACK SURGERY  2013    Dr. Lucas Rivera    COLONOSCOPY      COLONOSCOPY N/A 2022    COLONOSCOPY DIAGNOSTIC performed by Keith Perez MD at [address]  [facility]    LAPAROSCOPIC APPENDECTOMY performed by Violeta Padilla MD at [address] Bilateral 2013    Dr. Roxana Link; Iliac stents    TONSILLECTOMY         Family History Problem Relation Age of Onset    Breast Cancer Mother     Prostate Cancer Father 70       CareTeam (Including outside providers/suppliers regularly involved in providing care):   Patient Care Team:  Carla Orellana MD as PCP - General (Internal Medicine)  Carla Orellana MD as PCP - Empaneled Provider  Rhonda Foster MD as Surgeon (General Surgery)    Wt Readings from Last 3 Encounters:   02/17/23 180 lb (81.6 kg)   12/22/22 175 lb (79.4 kg)   12/09/22 168 lb (76.2 kg)     Vitals:    02/17/23 0843   BP: 138/60   Pulse: 84   SpO2: 98%   Weight: 180 lb (81.6 kg)   Height: 5' 11\" (1.803 m)     Body mass index is 25.1 kg/m². Based upon direct observation of the patient, evaluation of cognition reveals recent and remote memory intact. Review of Systems   Constitutional:  Negative for activity change, appetite change and fatigue. HENT:  Negative for congestion, hearing loss, mouth sores and sore throat. Respiratory:  Negative for cough, chest tightness, shortness of breath and wheezing. Cardiovascular:  Negative for chest pain, palpitations and leg swelling. Gastrointestinal:  Negative for abdominal pain, constipation, nausea and vomiting. Genitourinary:  Negative for difficulty urinating, dysuria, frequency, hematuria and urgency. Musculoskeletal:  Positive for arthralgias, neck pain and neck stiffness. Negative for back pain, gait problem and joint swelling. Skin:  Negative for color change and wound. Allergic/Immunologic: Negative for environmental allergies and immunocompromised state. Neurological:  Positive for numbness. Negative for dizziness, light-headedness and headaches. Psychiatric/Behavioral:  Negative for behavioral problems, dysphoric mood and sleep disturbance. The patient is not nervous/anxious. Physical Exam  Constitutional:       General: He is not in acute distress. Appearance: Normal appearance. He is normal weight. He is not toxic-appearing.    HENT: Head: Normocephalic. Right Ear: Tympanic membrane, ear canal and external ear normal.      Left Ear: Tympanic membrane, ear canal and external ear normal.      Mouth/Throat:      Mouth: Mucous membranes are moist.      Pharynx: Oropharynx is clear. Eyes:      Conjunctiva/sclera: Conjunctivae normal.   Cardiovascular:      Rate and Rhythm: Normal rate and regular rhythm. Heart sounds: Normal heart sounds. No murmur heard. Pulmonary:      Effort: Pulmonary effort is normal. No respiratory distress. Breath sounds: No wheezing. Abdominal:      Palpations: Abdomen is soft. Tenderness: There is no abdominal tenderness. There is no rebound. Musculoskeletal:         General: Normal range of motion. Cervical back: Neck supple. Tenderness present. Right lower leg: No edema. Left lower leg: No edema. Lymphadenopathy:      Cervical: No cervical adenopathy. Skin:     General: Skin is warm and dry. Neurological:      General: No focal deficit present. Mental Status: He is alert. Cranial Nerves: No cranial nerve deficit. Motor: No weakness. Gait: Gait normal.   Psychiatric:         Mood and Affect: Mood normal.        Patient's complete Health Risk Assessment and screening values have been reviewed and are found in Flowsheets. The following problems were reviewed today and where indicated follow up appointments were made and/or referrals ordered.     Positive Risk Factor Screenings with Interventions:         Substance History:  Social History       Tobacco History       Smoking Status  Former Quit Date  8/1/2018 Smoking Frequency  0.75 packs/day for 45.00 years (33.75 pk-yrs) Smoking Tobacco Type  Cigarettes quit in 8/1/2018      Smokeless Tobacco Use  Current Smokeless Tobacco Type  Chew              Alcohol History       Alcohol Use Status  Yes Drinks/Week  14 Shots of liquor per week Amount  14.0 standard drinks of alcohol/wk Comment  2 drinks every other day Drug Use       Drug Use Status  No              Sexual Activity       Sexually Active  Not Asked                   Alcohol Screening:       A score of 8 or more is associated with harmful or hazardous drinking. A score of 13 or more in women, and 15 or more in men, is likely to indicate alcohol dependence. Substance Abuse Interventions:  Uses nicotine chew, counseled    General Health and ACP:  General  In general, how would you say your health is?: Good  In the past 7 days, have you experienced any of the following: New or Increased Pain, New or Increased Fatigue, Loneliness, Social Isolation, Stress or Anger?: (!) Yes  Select all that apply: (!) New or Increased Pain  Do you get the social and emotional support that you need?: Yes  Do you have a Living Will?: Yes    Advance Directives       Power of  Living Will ACP-Advance Directive ACP-Power of     Not on File Filed on 08/17/13 Filed Not on File        General Health Risk Interventions:  Pain issues: physical therapy referral ordered      Safety:  Do you have working smoke detectors?: (!) No  Do you have either shower bars, grab bars, non-slip mats or non-slip surfaces in your shower or bathtub?: (!) No  Do you always fasten your seatbelt when you are in a car?: (!) No  Interventions:  noconcerns       Personalized Preventive Plan   Current Health Maintenance Status  Immunization History   Administered Date(s) Administered    COVID-19, MODERNA BLUE border, Primary or Immunocompromised, (age 12y+), IM, 100 mcg/0.5mL 03/10/2021, 04/07/2021, 11/03/2021    COVID-19, PFIZER Bivalent BOOSTER, DO NOT Dilute, (age 12y+), IM, 30 mcg/0.3 mL 09/23/2022    Influenza Virus Vaccine 12/01/2013    Influenza, FLUAD, (age 72 y+), Adjuvanted, 0.5mL 10/11/2021    Pneumococcal Conjugate 13-valent ([id number]) 03/09/2020    Pneumococcal Polysaccharide ([id number]) 03/14/2017    Pneumococcal conjugate PCV20, PF (Prevnar 20) 08/18/2022    Tdap (Boostrix, Adacel) 03/14/2017, 08/22/2022    Zoster Live (Zostavax) 02/13/2015    Zoster Recombinant (Shingrix) 07/12/2021      Health Maintenance   Topic Date Due    Low dose CT lung screening  Never done    Shingles vaccine (3 of 3) 09/06/2021    Flu vaccine (1) 08/01/2022    Annual Wellness Visit (AWV)  Never done    A1C test (Diabetic or Prediabetic)  08/18/2023    Lipids  08/18/2023    Depression Screen  02/17/2024    Colorectal Cancer Screen  11/07/2027    DTaP/Tdap/Td vaccine (3 - Td or Tdap) 08/22/2032    Pneumococcal 65+ years Vaccine  Completed    COVID-19 Vaccine  Completed    AAA screen  Completed    Hepatitis C screen  Completed    Hepatitis A vaccine  Aged Out    Hib vaccine  Aged Out    Meningococcal (ACWY) vaccine  Aged Out     Recommendations for [email or web address] Due: see orders and patient instructions/AVS.    1. Medicare annual wellness visit, subsequent  2. Cervical radiculopathy  Assessment & Plan:   Patient does have known history of degenerative joint disease of cervical spine with last MRI done in 2012, he has been experiencing radiculopathy symptoms on the right side for the past week or so, denies acute injury, will do a trial of physical therapy and traction since this helped in the past with similar flareup however if new or worsening symptoms or no improvement with PT then will need to have updated MRI. Can continue as needed NSAIDs for pain and discomfort  Orders:  -     [facility]  3. Gastroesophageal reflux disease without esophagitis  Assessment & Plan:   Symptoms stable and well-controlled on current dose of omeprazole, continue same along with antireflux diet and GERD lifestyle modification changes  Orders:  -     omeprazole (PRILOSEC) 20 MG delayed release capsule; TAKE 1 CAPSULE EVERY       MORNING BEFORE BREAKFAST, Disp-90 capsule, R-1Normal  4.  Primary hypertension  Assessment & Plan:   Blood pressure stable and well-controlled on current combination of losartan and hydrochlorothiazide, has been doing okay after reducing losartan dose with no more dizzy spells, update labs, reinforced recommendations for low-salt low-fat diet, active lifestyle and discontinue use of tobacco products  Orders:  -     losartan (COZAAR) 50 MG tablet; TAKE 1 TABLET DAILY, Disp-90 tablet, R-1Patient must establish with a new provider for future refills. Normal  -     hydroCHLOROthiazide (HYDRODIURIL) 25 MG tablet; TAKE 1 TABLET DAILY, Disp-90 tablet, R-1Normal  -     Lipid Panel; Future  -     Comprehensive Metabolic Panel; Future  -     CBC; Future  5. PVD (peripheral vascular disease) ([facility] [address]  Assessment & Plan:   S/p femoral artery stents in the past, currently asymptomatic, encouraged to continue daily walks, continue statin and aspirin, he no longer smokes but continues to use tobacco dip, encouraged regarding need for complete abstinence from tobacco products  Orders:  -     atorvastatin (LIPITOR) 20 MG tablet; Take 1 tablet by mouth daily, Disp-90 tablet, R-1Patient must establish with a new provider for future refills. Normal  6. Impaired fasting blood sugar  Assessment & Plan:   Update labs and make recommendations accordingly, reinforced recommendation to adhere to low-carb diet with active lifestyle  Orders:  -     Hemoglobin A1C; Future  -     Comprehensive Metabolic Panel; Future  7. Mixed hyperlipidemia  Assessment & Plan: We will update labs and adjust statin dose accordingly otherwise continue same, reinforced recommendations for diet and exercise  Orders:  -     atorvastatin (LIPITOR) 20 MG tablet; Take 1 tablet by mouth daily, Disp-90 tablet, R-1Patient must establish with a new provider for future refills. Normal  -     Lipid Panel; Future  -     Comprehensive Metabolic Panel; Future  8. Prostate cancer screening  -     PSA, Prostatic Specific Antigen; Future      Return in about 3 months (around 5/17/2023).     Recommended screening schedule for the next 5-10 years is provided to the patient in written form: see Patient Instructions/AVS.    Electronically signed by Naldo Benson MD on 2/17/2023 at 9:55 AM.

## 2023-02-17 NOTE — ASSESSMENT & PLAN NOTE
Symptoms stable and well-controlled on current dose of omeprazole, continue same along with antireflux diet and GERD lifestyle modification changes

## 2023-02-17 NOTE — ASSESSMENT & PLAN NOTE
We will update labs and adjust statin dose accordingly otherwise continue same, reinforced recommendations for diet and exercise

## 2023-02-18 LAB
ESTIMATED AVERAGE GLUCOSE: 102.5 MG/DL
HBA1C MFR BLD: 5.2 %

## 2023-02-23 ENCOUNTER — HOSPITAL ENCOUNTER (OUTPATIENT)
Dept: PHYSICAL THERAPY | Age: 68
Setting detail: THERAPIES SERIES
Discharge: HOME | End: 2023-02-23
Payer: COMMERCIAL

## 2023-02-23 DIAGNOSIS — N28.9 ABNORMAL KIDNEY FUNCTION: ICD-10-CM

## 2023-02-23 DIAGNOSIS — I10 PRIMARY HYPERTENSION: ICD-10-CM

## 2023-02-23 LAB
ANION GAP SERPL CALCULATED.3IONS-SCNC: 14 MMOL/L (ref 3–16)
BUN BLDV-MCNC: 20 MG/DL (ref 7–20)
CALCIUM SERPL-MCNC: 9.7 MG/DL (ref 8.3–10.6)
CHLORIDE BLD-SCNC: 101 MMOL/L (ref 99–110)
CO2: 26 MMOL/L (ref 21–32)
CREAT SERPL-MCNC: 1.1 MG/DL (ref 0.8–1.3)
GFR SERPL CREATININE-BSD FRML MDRD: >60 ML/MIN/{1.73_M2}
GLUCOSE BLD-MCNC: 118 MG/DL (ref 70–99)
POTASSIUM SERPL-SCNC: 5.4 MMOL/L (ref 3.5–5.1)
SODIUM BLD-SCNC: 141 MMOL/L (ref 136–145)

## 2023-02-23 PROCEDURE — 97162 PT EVAL MOD COMPLEX 30 MIN: CPT

## 2023-02-23 PROCEDURE — 97012 MECHANICAL TRACTION THERAPY: CPT

## 2023-02-23 PROCEDURE — 97110 THERAPEUTIC EXERCISES: CPT

## 2023-02-23 PROCEDURE — 97140 MANUAL THERAPY 1/> REGIONS: CPT

## 2023-02-23 NOTE — FLOWSHEET NOTE
[address] [place] [address] [place]  Phone: [phone]   Fax: [phone]    Physical Therapy Daily Treatment Note    Date:  2023     Patient Name:  Chilo Velásquez    :  1955  MRN: [id number]  Medical Diagnosis:  Cervical radiculopathy [M54.12]  Treatment Diagnosis: dec cervical ROM and hypomobility, impaired posture and DCF control with (+) s/s for cervical radiculopathy     Insurance/Certification information:  PT Insurance Information: [employer] - $40 copay, no auth req  Physician Information:  Shruthi Carbajal MD    Plan of care signed (Y/N): []  Yes [x]  No     Date of Patient follow up with Physician:      Progress Report: []  Yes  [x]  No     Date Range for reporting period:  Beginnin2023  Ending:     Progress report due (10 Rx/or 30 days whichever is less): visit #10 or  (date)     Recertification due (POC duration/ or 90 days whichever is less): visit #12 or 23 (date)     Visit # Insurance Allowable Auth required?  Date Range   1 [place] []  Yes  [x]  No        Latex Allergy:  [x]NO      []YES  Preferred Language for Healthcare:   [x]English       []other:    Functional Scale:       Date assessed:  NDI: raw score = 7; dysfunction = 14%  23    Pain level:  5/10     SUBJECTIVE:  See eval    OBJECTIVE: See eval  Observation:   Test measurements:      RESTRICTIONS/PRECAUTIONS: HTN, OA, low back surgery in  (\"cleaned it out\"), iliac stents    Exercises/Interventions:   Therapeutic Exercise (15299) Resistance / level Sets/sec Reps Notes   UBE              Doorway pec stretch       Janessa-scapular strengthening                                   HEP review and education on sleep positioning  13'     Therapeutic Activities (97093)                                                 NMR re-education (72738)       CT progressions                                   Manual Intervention (25258)       Cerv mobs/manip: downglides B  4'     Thoracic mobs/manip CT manip       Rib mobilizations       STM: cervical paraspinals  2'     Gentle cervical traction  4'                              Modalities:     Patient education:  -pt educated on diagnosis, prognosis and expectations for rehab  -all pt questions were answered    Home Exercise Program:  Access Code: [email or web address]  URL: [email or web address]  Date: 02/23/2023  Prepared by: Audria Lennox    Exercises  Seated Scapular Retraction - 3 x daily - 7 x weekly - 1 sets - 10 reps - 3-5 hold  Chin Tuck - 3 x daily - 7 x weekly - 1 sets - 10 reps - 5-10 hold  Median Nerve Flossing - Tray - 2-3 x daily - 7 x weekly - 1 sets - 10 reps    Patient Education  Sleep Positions    Therapeutic Exercise and NMR EXR  [x] (83537) Provided verbal/tactile cueing for activities related to strengthening, flexibility, endurance, ROM  for improvements in cervical, postural, scapular, scapulothoracic and UE control with self care, reaching, carrying, lifting, house/yardwork, driving/computer work.    [] (31711) Provided verbal/tactile cueing for activities related to improving balance, coordination, kinesthetic sense, posture, motor skill, proprioception  to assist with cervical, scapular, scapulothoracic and UE control with self care, reaching, carrying, lifting, house/yardwork, driving/computer work.  [] (69118) Therapist is in constant attendance of 2 or more patients providing skilled therapy interventions, but not providing any significant amount of measurable one-on-one time to either patient, for improvements in cervical, scapular, scapulothoracic and UE control with self care, reaching, carrying, lifting, house/yardwork, driving, computer work.      Therapeutic Activities:    [] (31466 or 17379) Provided verbal/tactile cueing for activities related to improving balance, coordination, kinesthetic sense, posture, motor skill, proprioception and motor activation to allow for proper function of cervical, scapular, scapulothoracic and UE control with self care, carrying, lifting, driving/computer work.     Home Exercise Program:    [x] (58718) Reviewed/Progressed HEP activities related to strengthening, flexibility, endurance, ROM of cervical, scapular, scapulothoracic and UE control with self care, reaching, carrying, lifting, house/yardwork, driving/computer work  [] (55715) Reviewed/Progressed HEP activities related to improving balance, coordination, kinesthetic sense, posture, motor skill, proprioception of cervical, scapular, scapulothoracic and UE control with self care, reaching, carrying, lifting, house/yardwork, driving/computer work      Manual Treatments:  PROM / STM / Oscillations-Mobs:  G-I, II, III, IV (PA's, Inf., Post.)  [x] (27551) Provided manual therapy to mobilize soft tissue/joints of cervical/CT, scapular GHJ and UE for the purpose of decreasing headache, modulating pain, promoting relaxation,  increasing ROM, reducing/eliminating soft tissue swelling/inflammation/restriction, improving soft tissue extensibility and allowing for proper ROM for normal function with self care, reaching, carrying, lifting, house/yardwork, driving/computer work    Charges:  Timed Code Treatment Minutes: 24   Total Treatment Minutes: 54       [] EVAL - LOW (18290)   [x] EVAL - MOD (26300)  [] EVAL - HIGH (32848)  [] RE-EVAL (12570)  [x] TE(07133) x 1      [] Ionto  [] NMR (73556) x       [] Vaso  [x] Manual (73479) x  1     [] Ultrasound  [] TA x        [x] Mech Traction (07634)  [] Aquatic Therapy x     [] ES (un) (00683):   [] Home Management Training x  [] ES(attended) (97032)   [] Dry Needling 1-2 muscles (20560):  [] Dry Needling 3+ muscles (205601  [] Group:      [] Other:     GOALS:  Patient stated goal: \"pain relief\"  [] Progressing: [] Met: [] Not Met: [] Adjusted    Therapist goals for Patient:   Short Term Goals: To be achieved in: 2 weeks  1. Independent in HEP and progression per patient tolerance, in order to prevent re-injury.   []  Progressing: [] Met: [] Not Met: [] Adjusted  2. Patient will have a decrease in pain to facilitate improvement in movement, function, and ADLs as indicated by Functional Deficits. [] Progressing: [] Met: [] Not Met: [] Adjusted    Long Term Goals: To be achieved in: 6 weeks  1. Pt will improve NDI by 10 points to reduce disability and progress towards PLOF. [] Progressing: [] Met: [] Not Met: [] Adjusted  2. Patient will demonstrate increased AROM to [facility] of cervical/thoracic spine to allow for proper joint functioning as indicated by patients Functional Deficits. [] Progressing: [] Met: [] Not Met: [] Adjusted  3. Patient will demonstrate an increase in postural awareness and control and activation of  Deep cervical stabilizers to allow for proper functional mobility as indicated by patients Functional Deficits. [] Progressing: [] Met: [] Not Met: [] Adjusted  4. Patient will return to functional activities including reaching [place], sleeping without increased symptoms or restriction. [] Progressing: [] Met: [] Not Met: [] Adjusted  5. Patient will have negative ULNT and distraction testing to demonstrate resolution of cervical radiculopathy symptoms. [] Progressing: [] Met: [] Not Met: [] Adjusted     Overall Progression Towards Functional goals/ Treatment Progress Update:  [] Patient is progressing as expected towards functional goals listed. [] Progression is slowed due to complexities/Impairments listed. [] Progression has been slowed due to co-morbidities.   [x] Plan just implemented, too soon to assess goals progression <30days   [] Goals require adjustment due to lack of progress  [] Patient is not progressing as expected and requires additional follow up with physician  [] Other    Persisting Functional Limitations/Impairments:  []Sitting []Standing   []Walking []Squatting/bending    []Stairs []ADL's    []Transfers [x]Reaching  []Housework []Lifting  []Driving []Job related tasks  []Sports/Recreation  [x]Sleeping  [x]Other: yardwork    ASSESSMENT:  See eval    Treatment/Activity Tolerance:  [x] Patient able to complete tx  [] Patient limited by fatique  [] Patient limited by pain   [] Patient limited by other medical complications  [] Other:     Prognosis: [x] Good [] Fair  [] Poor    Patient Requires Follow-up: [x] Yes  [] No    PLAN: See eval. PT 2x / week for 6 weeks. [] Continue per plan of care [] Alter current plan (see comments)  [x] Plan of care initiated [] Hold pending MD visit [] Discharge    Electronically signed by: Rue Hamman, PT, DPT, OMT-C      Note: If patient does not return for scheduled/ recommended follow up visits, this note will serve as a discharge from care along with most recent update on progress.

## 2023-02-23 NOTE — PLAN OF CARE
[id number] [address], [facility] [address]  Phone: [phone]   Fax:     [phone]                                                       Physical Therapy Certification    Dear Shruthi Carbajal MD  ,    We had the pleasure of evaluating the following patient for physical therapy services at [address] [place] [address]. A summary of our findings can be found in the initial assessment below. This includes our plan of care. If you have any questions or concerns regarding these findings, please do not hesitate to contact me at the office phone number checked above. Thank you for the referral.       Physician Signature:_______________________________Date:__________________  By signing above (or electronic signature), therapists plan is approved by physician      Patient: Chilo Velásquez   : 1955   MRN: [id number]  Referring Physician: Shruthi Carbajal MD        Evaluation Date: 2023      Medical Diagnosis Information:  Cervical radiculopathy [M54.12]   PT diagnosis: dec cervical ROM and hypomobility, impaired posture and DCF control with (+) s/s for cervical radiculopathy                                         Insurance information: PT Insurance Information: [employer] - $40 copay, no auth req    Precautions/ Contra-indications: HTN, OA, low back surgery in  (\"cleaned it out\"), iliac stents  Latex Allergy:  [x]NO      []YES  Preferred Language for Healthcare:   [x]English       []Other:    C-SSRS Triggered by Intake questionnaire (Past 2 wk assessment ):   [x] No, Questionnaire did not trigger screening.   [] Yes, Patient intake triggered C-SSRS Screening     [] Completed, no further action required. [] Completed, PCP notified via Epic    SUBJECTIVE: Patient with hx of similar neck pain in . Pt woke up with this pain a couple weeks ago.  Pt reports pain that starts at base of his neck, spreads laterally to R shoulder, down lateral shoulder to elbow, and into R thumb. Pt had lost strength in his hand previously, but that injury was more severe. Feels like he gained that strength back since. Pain worse when looking down at cell phone, denies pain when looking over shoulder to check blind spot. N/T when reaching R arm overhead. Pt is able to do everything he wants, but might cause extra pain. Cushioned chair is more comfortable than a kitchen chair. Pt sleeps on side, this has been bothersome since neck pain started. Fear avoidance: I should not do physical activities that (might) make my pain worse   [] True   [x] False     Relevant Medical History: HTN, OA, low back surgery in 2012 (\"cleaned it out\"), iliac stents    Functional Scale:       Date assessed:  NDI: raw score = 7; dysfunction = 14%  2/23/23    Pain Scale: 5/10  Easing factors: advil  Provocative factors: reaching R arm OH, looking down at phone     Type: [x]Constant   []Intermittent  [x]Radiating []Localized []other:     Numbness/Tingling: down lateral R arm into R thumb    Occupation/School: retired     Living Status/Prior Level of Function: Prior to this injury / incident, pt was independent with ADLs and IADLs, enjoys walking, working outside, babysitting grandkids (11 month old). OBJECTIVE:   Palpation: no overt TTP    Functional Mobility/Transfers: indep, slow, guarded transfers    Posture: mild FHP    Bandages/Dressings/Incisions: none    Gait: (include devices/WB status):  WNL     Dermatomes Normal Abnormal Comments   Top of head (C1)      Posterior occipital region (C2)      Side of neck (C3) x     Top of shoulder (C4) x     Lateral deltoid (C5) x     Tip of thumb (C6) x     Distal middle finger (C7) x     Distal fifth finger (C8) x     Medial forearm (T1) x     Lower extremity          Reflexes Normal Abnormal Comments   C5-6 Biceps x     C5-6 Brachioradialis x     C7-8 Triceps x     Lopezs      S1-2 Seated achilles      S1-2 Prone knee bend      L3-4 Patellar tendon      Clonus      Babinski          CERV ROM     Cervical Flexion 60    Cervical Extension 40*    Cervical SB 20 R, 26 L    Cervical rotation 42 R*, 65 L         ROM Left Right   Shoulder Flex Grossly WFL Grossly [facility] [place]   Shoulder Abd     Shoulder ER     Shoulder IR               Strength / Myotomes Left Right   Cervical Flexion (C1-2) Grossly 5/5  Grossly 5/5 except:   Cervical Side-bending (C3)     Shoulder Shrug (C4)     Shoulder Flex     Shoulder Scap     Shoulder Abduction (C5)  4+   Shoulder ER  4+   Shoulder IR     Biceps (C6)     Triceps (C7)     Wrist Extension (C6)     Wrist Flexion (C7)           Thumb Abduction (C8)     Finger Abduction (T1)       Lower extremity myotomes:   [x]Normal     []Abnormal     Joint mobility:    []Normal    [x]Hypo   []Hyper    Orthopaedic Special Tests  Positive  Negative  NT Comments    Hautard's        Rhomberg       Sharps-Wilfrido       Cervical Torsion / Body Rotation        C2 Kick       Modified Shear       Compression  x     Distraction  x      ULNT x                             [x] Patient history, allergies, meds reviewed. Medical chart reviewed. See intake form. Review Of Systems (ROS):  [x]Performed Review of systems (Integumentary, CardioPulmonary, Neurological) by intake and observation. Intake form has been scanned into medical record. Patient has been instructed to contact their primary care physician regarding ROS issues if not already being addressed at this time.       Co-morbidities/Complexities (which will affect course of rehabilitation):   []None        []Hx of COVID   Arthritic conditions   []Rheumatoid arthritis (M05.9)  [x]Osteoarthritis (M19.91)  []Gout   Cardiovascular conditions   [x]Hypertension (I10)  []Hyperlipidemia (E78.5)  []Angina pectoris (I20)  []Atherosclerosis (I70)  []Pacemaker  [x]Hx of CABG/stent/  cardiac surgeries   Musculoskeletal conditions   []Disc pathology   []Congenital spine pathologies   []Osteoporosis (M81.8)  []Osteopenia (M85.8)  []Scoliosis       Endocrine conditions   []Hypothyroid (E03.9)  []Hyperthyroid Gastrointestinal conditions   []Constipation (E94.92)   Metabolic conditions   []Morbid obesity (E66.01)  []Diabetes type 1(E10.65) or 2 (E11.65)   []Neuropathy (G60.9)     Cardio/Pulmonary conditions   []Asthma (J45)  []Coughing   []COPD (J44.9)  []CHF  []A-fib   Psychological Disorders  []Anxiety (F41.9)  []Depression (F32.9)   []Other:   Developmental Disorders  []Autism (F84.0)  []CP (G80)  []Down Syndrome (Q90.9)  []Developmental delay     Neurological conditions  []Prior Stroke (I69.30)  []Parkinson's (G20)  []Encephalopathy (G93.40)  []MS (G35)  []Post-polio (G14)  []SCI  []TBI  []ALS Other conditions  []Fibromyalgia (M79.7)  []Vertigo  []Syncope  []Kidney Failure  []Cancer      []currently undergoing                treatment  []Pregnancy  []Incontinence   Prior surgeries  []involved limb  [x]previous spinal surgery  [] section birth  []hysterectomy  []bowel / bladder surgery  []other relevant surgeries   []Other:              Barriers to/and or personal factors that will affect rehab potential:              [x]Age  []Sex   []Smoker              []Motivation/Lack of Motivation                        [x]Co-Morbidities              []Cognitive Function, education/learning barriers              []Environmental, home barriers              []profession/work barriers  [x]past PT/medical experience  []other:  Justification:     Falls Risk Assessment (30 days):   [x] Falls Risk assessed and no intervention required. [] Falls Risk assessed and Patient requires intervention due to being higher risk   TUG score (>12s at risk):     [] Falls education provided, including         ASSESSMENT: Luke Pineda is a 75 y.o. male presenting to physical therapy with R sided neck pain radiating into R lateral UE, onset ~ 2 weeks ago.  Pt demonstrates dec cervical ROM and hypomobility, impaired posture and DCF control with (+) s/s for cervical radiculopathy. Pt would benefit from skilled PT to return to PLOF and dec pain with sleeping and reaching OH. Functional Impairments:     [x]Noted cervical/thoracic/GHJ joint hypomobility   []Noted cervical/thoracic/GHJ joint hypermobility   [x]Decreased cervical/UE functional ROM   []Noted Headache pain aggravated by neck movements with/without dizziness   []Abnormal reflexes/sensation/myotomal/dermatomal deficits   []Decreased DCF control or ability to hold head up   [x]Decreased RC/scapular/core strength and neuromuscular control    [x]Decreased UE functional strength   []other:      Functional Activity Limitations (from functional questionnaire and intake)   [x]Reduced ability to tolerate prolonged functional positions   []Reduced ability or difficulty with changes of positions or transfers between positions   []Reduced ability to maintain good posture and demonstrate good body mechanics with sitting, bending, and lifting   [] Reduced ability or tolerance with driving and/or computer work   []Reduced ability to perform lifting, reaching, carrying tasks   []Reduced ability to concentrate   [x]Reduced ability to sleep    []Reduced ability to tolerate any impact through UE or spine   []Reduced ability to ambulate prolonged functional periods/distances   []other:    Participation Restrictions   [x]Reduced participation in self care activities   []Reduced participation in home management activities   []Reduced participation in work activities   [x]Reduced participation in social activities. []Reduced participation in sport/recreational activities.     Classification/Subgrouping:   []signs/symptoms consistent with neck pain with mobility deficits     []signs/symptoms consistent with neck pain with movement coordinated impairments    [x]signs/symptoms consistent with neck pain with radiating pain    []signs/symptoms consistent with neck pain with headaches (cervicogenic) []Signs/symptoms consistent with nerve root involvement including myotome & dermatome dysfunction   []sign/symptoms consistent with facet dysfunction of cervical and thoracic spine    []signs/symptoms consistent suggesting central cord compression/UMN syndromes   [x]signs/symptoms consistent with discogenic cervical pain   []signs/symptoms consistent with rib dysfunction   []signs/symptoms consistent with postural dysfunction   []signs/symptoms consistent with shoulder pathology    []signs/symptoms consistent with post-surgical status including decreased ROM, strength and function.    []signs/symptoms consistent with pathology which may benefit from Dry Needling   []signs/symptoms which may limit the use of advanced manual therapy techniques: (Hypertension, recent trauma, intolerance to end range positions, prior TIA, visual issues, UE myotomes loss )     Prognosis/Rehab Potential:      []Excellent   [x]Good    []Fair   []Poor    Tolerance of evaluation/treatment:    []Excellent   [x]Good    []Fair   []Poor    Physical Therapy Evaluation Complexity Justification  [x] A history of present problem with:  [] no personal factors and/or comorbidities that impact the plan of care;  []1-2 personal factors and/or comorbidities that impact the plan of care  [x]3 personal factors and/or comorbidities that impact the plan of care  [x] An examination of body systems using standardized tests and measures addressing any of the following: body structures and functions (impairments), activity limitations, and/or participation restrictions;:  [] a total of 1-2 or more elements   [] a total of 3 or more elements   [x] a total of 4 or more elements   [x] A clinical presentation with:  [] stable and/or uncomplicated characteristics   [x] evolving clinical presentation with changing characteristics  [] unstable and unpredictable characteristics;   [x] Clinical decision making of [] low, [x] moderate, [] high complexity using standardized patient assessment instrument and/or measurable assessment of functional outcome. [] EVAL (LOW) 42308 (typically 20 minutes face-to-face)  [x] EVAL (MOD) 44224 (typically 30 minutes face-to-face)  [] EVAL (HIGH) 60162 (typically 45 minutes face-to-face)  [] RE-EVAL     PLAN:   Frequency/Duration:  2 days per week for 6 Weeks:  Interventions:  [x]  Therapeutic exercise including: strength training, ROM, for cervical spine,scapula, core and Upper extremity, including postural re-education. [x]  NMR activation and proprioception for Deep cervical flexors, periscapular and RC muscles and Core, including postural re-education. [x]  Manual therapy as indicated for C/T spine, ribs, Soft tissue to include: Dry Needling/IASTM, STM, PROM, Gr I-IV mobilizations, manipulation. [x] Modalities as needed that may include: thermal agents, E-stim, Biofeedback, US, iontophoresis as indicated  [x] Patient education on joint protection, postural re-education, activity modification, progression of HEP. HEP instruction: Written HEP instructions provided and reviewed. GOALS:  Patient stated goal: \"pain relief\"  [] Progressing: [] Met: [] Not Met: [] Adjusted    Therapist goals for Patient:   Short Term Goals: To be achieved in: 2 weeks  1. Independent in HEP and progression per patient tolerance, in order to prevent re-injury. [] Progressing: [] Met: [] Not Met: [] Adjusted  2. Patient will have a decrease in pain to facilitate improvement in movement, function, and ADLs as indicated by Functional Deficits. [] Progressing: [] Met: [] Not Met: [] Adjusted    Long Term Goals: To be achieved in: 6 weeks  1. Pt will improve NDI by 10 points to reduce disability and progress towards PLOF. [] Progressing: [] Met: [] Not Met: [] Adjusted  2. Patient will demonstrate increased AROM to [facility] of cervical/thoracic spine to allow for proper joint functioning as indicated by patients Functional Deficits.    [] Progressing: [] Met: [] Not Met: [] Adjusted  3. Patient will demonstrate an increase in postural awareness and control and activation of  Deep cervical stabilizers to allow for proper functional mobility as indicated by patients Functional Deficits. [] Progressing: [] Met: [] Not Met: [] Adjusted  4. Patient will return to functional activities including reaching [place], sleeping without increased symptoms or restriction. [] Progressing: [] Met: [] Not Met: [] Adjusted  5. Patient will have negative ULNT and distraction testing to demonstrate resolution of cervical radiculopathy symptoms.   [] Progressing: [] Met: [] Not Met: [] Adjusted     Electronically signed by:  Sari Nobles, PT

## 2023-02-27 ENCOUNTER — HOSPITAL ENCOUNTER (OUTPATIENT)
Dept: PHYSICAL THERAPY | Age: 68
Setting detail: THERAPIES SERIES
Discharge: HOME | End: 2023-02-27
Payer: COMMERCIAL

## 2023-02-27 PROCEDURE — 97110 THERAPEUTIC EXERCISES: CPT

## 2023-02-27 PROCEDURE — 97012 MECHANICAL TRACTION THERAPY: CPT

## 2023-02-27 PROCEDURE — 97140 MANUAL THERAPY 1/> REGIONS: CPT

## 2023-02-27 PROCEDURE — 97112 NEUROMUSCULAR REEDUCATION: CPT

## 2023-02-27 NOTE — FLOWSHEET NOTE
[address] [place] [address]  Phone: [phone]   Fax: [phone]    Physical Therapy Daily Treatment Note    Date:  2023     Patient Name:  Millicent Baumgarten    :  1955  MRN: [id number]  Medical Diagnosis:  Cervical radiculopathy [M54.12]  Treatment Diagnosis: dec cervical ROM and hypomobility, impaired posture and DCF control with (+) s/s for cervical radiculopathy     Insurance/Certification information:  PT Insurance Information: [employer] - $40 copay, no auth req  Physician Information:  Pepe Barcenas MD    Plan of care signed (Y/N): []  Yes [x]  No     Date of Patient follow up with Physician:      Progress Report: []  Yes  [x]  No     Date Range for reporting period:  Beginnin2023  Ending:     Progress report due (10 Rx/or 30 days whichever is less): visit #10 or  (date)     Recertification due (POC duration/ or 90 days whichever is less): visit #12 or 23 (date)     Visit # Insurance Allowable Auth required? Date Range   2 [place] []  Yes  [x]  No        Latex Allergy:  [x]NO      []YES  Preferred Language for Healthcare:   [x]English       []other:    Functional Scale:       Date assessed:  NDI: raw score = 7; dysfunction = 14%  23    Pain level:  1/10     SUBJECTIVE:  Pt is feeling better this morning, felt good after last session. Pt with some tingling and shoulder pain. Pt was able to try towel roll in pillow case, this helped but took some getting used to.     OBJECTIVE:   : mild FHP      RESTRICTIONS/PRECAUTIONS: HTN, OA, low back surgery in  (\"cleaned it out\"), iliac stents    Exercises/Interventions:   Therapeutic Exercise (09787) Resistance / level Sets/sec Reps Notes   UBE: fwd/bwd  2' ea            Doorway pec stretch  20\" 3    CC:  -LPD  -high row  -mid row   30#  35#  35#   2  2  2   10  10  10    TB horiz abd  TB ER with scap retraction  TB I's Lime  Lime  Orange 2  2  2 10  10  10 Therapeutic Activities (86337)                                                 NMR re-education (44277)       CT progressions:  -ball on wall, CT  -ball on wall, L/R  -seated with elbow resting on thighs, CT    5\"  1  1   10  10 B  10                                Manual Intervention (74598)       Cerv mobs/manip: downglides B  PA to cervical spine in supine  4'  4'     Thoracic mobs/manip       CT manip       Rib mobilizations       STM: cervical paraspinals      Gentle cervical traction                               Modalities:   2/23: Pt was set up on cervical traction in supine with bolsters under B knees with parameters of 15/10 lbs with on/off time of 30/10, for a total time of 10 minutes. Pt was given panic button as well as instructed how to use it if experiencing pain as well as given bell to call for needs. 2/27: 18/12#, 30/10 on/off, 12 min      Patient education:  -pt educated on diagnosis, prognosis and expectations for rehab  -all pt questions were answered    Home Exercise Program:  Access Code: [email or web address]  URL: [email or web address]  Date: 02/23/2023  Prepared by: Gordon Espinoza    Exercises  Seated Scapular Retraction - 3 x daily - 7 x weekly - 1 sets - 10 reps - 3-5 hold  Chin Tuck - 3 x daily - 7 x weekly - 1 sets - 10 reps - 5-10 hold  Median Nerve Flossing - Tray - 2-3 x daily - 7 x weekly - 1 sets - 10 reps    Patient Education  Sleep Positions    Therapeutic Exercise and NMR EXR  [x] (54480) Provided verbal/tactile cueing for activities related to strengthening, flexibility, endurance, ROM  for improvements in cervical, postural, scapular, scapulothoracic and UE control with self care, reaching, carrying, lifting, house/yardwork, driving/computer work.    [] (30776) Provided verbal/tactile cueing for activities related to improving balance, coordination, kinesthetic sense, posture, motor skill, proprioception  to assist with cervical, scapular, scapulothoracic and UE control with self care, reaching, carrying, lifting, house/yardwork, driving/computer work.  [] (31340) Therapist is in constant attendance of 2 or more patients providing skilled therapy interventions, but not providing any significant amount of measurable one-on-one time to either patient, for improvements in cervical, scapular, scapulothoracic and UE control with self care, reaching, carrying, lifting, house/yardwork, driving, computer work. Therapeutic Activities:    [] (53202 or 75585) Provided verbal/tactile cueing for activities related to improving balance, coordination, kinesthetic sense, posture, motor skill, proprioception and motor activation to allow for proper function of cervical, scapular, scapulothoracic and UE control with self care, carrying, lifting, driving/computer work.      Home Exercise Program:    [x] (55919) Reviewed/Progressed HEP activities related to strengthening, flexibility, endurance, ROM of cervical, scapular, scapulothoracic and UE control with self care, reaching, carrying, lifting, house/yardwork, driving/computer work  [] (54428) Reviewed/Progressed HEP activities related to improving balance, coordination, kinesthetic sense, posture, motor skill, proprioception of cervical, scapular, scapulothoracic and UE control with self care, reaching, carrying, lifting, house/yardwork, driving/computer work      Manual Treatments:  PROM / STM / Oscillations-Mobs:  G-I, II, III, IV (PA's, Inf., Post.)  [x] (51599) Provided manual therapy to mobilize soft tissue/joints of cervical/CT, scapular GHJ and UE for the purpose of decreasing headache, modulating pain, promoting relaxation,  increasing ROM, reducing/eliminating soft tissue swelling/inflammation/restriction, improving soft tissue extensibility and allowing for proper ROM for normal function with self care, reaching, carrying, lifting, house/yardwork, driving/computer work    Charges:  Timed Code Treatment Minutes: 41   Total Treatment Minutes: 53 [] EVAL - LOW (89737)   [] EVAL - MOD (07969)  [] EVAL - HIGH (52938)  [] RE-EVAL (42558)  [x] KH(86980) x 1      [] Ionto  [x] NMR (95258) x 1      [] Vaso  [x] Manual (83865) x  1     [] Ultrasound  [] TA x        [x] Mech Traction (98335)  [] Aquatic Therapy x     [] ES (un) (67314):   [] Home Management Training x  [] ES(attended) (29018)   [] Dry Needling 1-2 muscles (70011):  [] Dry Needling 3+ muscles (857332  [] Group:      [] Other:     GOALS:  Patient stated goal: \"pain relief\"  [] Progressing: [] Met: [] Not Met: [] Adjusted    Therapist goals for Patient:   Short Term Goals: To be achieved in: 2 weeks  1. Independent in HEP and progression per patient tolerance, in order to prevent re-injury. [] Progressing: [] Met: [] Not Met: [] Adjusted  2. Patient will have a decrease in pain to facilitate improvement in movement, function, and ADLs as indicated by Functional Deficits. [] Progressing: [] Met: [] Not Met: [] Adjusted    Long Term Goals: To be achieved in: 6 weeks  1. Pt will improve NDI by 10 points to reduce disability and progress towards PLOF. [] Progressing: [] Met: [] Not Met: [] Adjusted  2. Patient will demonstrate increased AROM to [facility] of cervical/thoracic spine to allow for proper joint functioning as indicated by patients Functional Deficits. [] Progressing: [] Met: [] Not Met: [] Adjusted  3. Patient will demonstrate an increase in postural awareness and control and activation of  Deep cervical stabilizers to allow for proper functional mobility as indicated by patients Functional Deficits. [] Progressing: [] Met: [] Not Met: [] Adjusted  4. Patient will return to functional activities including reaching [place], sleeping without increased symptoms or restriction. [] Progressing: [] Met: [] Not Met: [] Adjusted  5. Patient will have negative ULNT and distraction testing to demonstrate resolution of cervical radiculopathy symptoms.   [] Progressing: [] Met: [] Not Met: [] Adjusted     Overall Progression Towards Functional goals/ Treatment Progress Update:  [] Patient is progressing as expected towards functional goals listed. [] Progression is slowed due to complexities/Impairments listed. [] Progression has been slowed due to co-morbidities. [x] Plan just implemented, too soon to assess goals progression <30days   [] Goals require adjustment due to lack of progress  [] Patient is not progressing as expected and requires additional follow up with physician  [] Other    Persisting Functional Limitations/Impairments:  []Sitting []Standing   []Walking []Squatting/bending    []Stairs []ADL's    []Transfers [x]Reaching  []Housework []Lifting  []Driving []Job related tasks  []Sports/Recreation  [x]Sleeping  [x]Other: yardwork    ASSESSMENT:  Exercises introduced per log. Pt tolerated session well without pain exacerbation, though pain was reproduced with supine PA's to mid-low cervical spine. Pt required cues throughout treatment for sternal lift and scap retraction for good posture. Pt still presenting with FHP, addressed DCF strengthening and NM control for further postural awareness. Pt once again responding well to mechanical traction. Will continue to progress postural strengthening and awareness. Treatment/Activity Tolerance:  [x] Patient able to complete tx  [] Patient limited by fatique  [] Patient limited by pain   [] Patient limited by other medical complications  [] Other:     Prognosis: [x] Good [] Fair  [] Poor    Patient Requires Follow-up: [x] Yes  [] No    PLAN: See eval. PT 2x / week for 6 weeks. [x] Continue per plan of care [] Alter current plan (see comments)  [] Plan of care initiated [] Hold pending MD visit [] Discharge    Electronically signed by: Yoandy Carrillo PT, DPT, OMT-C      Note: If patient does not return for scheduled/ recommended follow up visits, this note will serve as a discharge from care along with most recent update on progress.

## 2023-03-02 ENCOUNTER — HOSPITAL ENCOUNTER (OUTPATIENT)
Dept: PHYSICAL THERAPY | Age: 68
Setting detail: THERAPIES SERIES
Discharge: HOME | End: 2023-03-02
Payer: COMMERCIAL

## 2023-03-02 PROCEDURE — 97110 THERAPEUTIC EXERCISES: CPT

## 2023-03-02 PROCEDURE — 97140 MANUAL THERAPY 1/> REGIONS: CPT

## 2023-03-02 PROCEDURE — 97112 NEUROMUSCULAR REEDUCATION: CPT

## 2023-03-02 PROCEDURE — 97012 MECHANICAL TRACTION THERAPY: CPT

## 2023-03-02 NOTE — FLOWSHEET NOTE
[address] [place] [address]  Phone: [phone]   Fax: [phone]    Physical Therapy Daily Treatment Note    Date:  2023     Patient Name:  Yaneth Raymond    :  1955  MRN: [id number]  Medical Diagnosis:  Cervical radiculopathy [M54.12]  Treatment Diagnosis: dec cervical ROM and hypomobility, impaired posture and DCF control with (+) s/s for cervical radiculopathy     Insurance/Certification information:  PT Insurance Information: [employer] - $40 copay, no auth req  Physician Information:  Harvey Menjivar MD    Plan of care signed (Y/N): []  Yes [x]  No     Date of Patient follow up with Physician:      Progress Report: []  Yes  [x]  No     Date Range for reporting period:  Beginnin2023  Ending:     Progress report due (10 Rx/or 30 days whichever is less): visit #10 or  (date)     Recertification due (POC duration/ or 90 days whichever is less): visit #12 or 23 (date)     Visit # Insurance Allowable Auth required? Date Range   3 [place] []  Yes  [x]  No        Latex Allergy:  [x]NO      []YES  Preferred Language for Healthcare:   [x]English       []other:    Functional Scale:       Date assessed:  NDI: raw score = 7; dysfunction = 14%  23    Pain level:  1/10     SUBJECTIVE:  Pt continues to report significant improvement in symptoms. Still with some numbness in R lateral shoulder near deltoid insertion, but overall symptoms are improving, may want to d/c at npv as long as pain continues to trend positively.     OBJECTIVE:   : mild FHP  3/2: dec R ER strength and AROM      RESTRICTIONS/PRECAUTIONS: HTN, OA, low back surgery in  (\"cleaned it out\"), iliac stents    Exercises/Interventions:   Therapeutic Exercise (83006) Resistance / level Sets/sec Reps Notes   UBE: fwd/bwd  2' ea            Doorway pec stretch  20\" 3    CC:  -LPD  -high row  -mid row   30#  40#  40#   2  2  2   15  15  15    TB horiz abd  TB ER with scap retraction  TB I's Lime  Lime  Orange 2  2  10  10     Wall slide + low trap iso lift off  3\" 15 3/2                        Therapeutic Activities (46100)                                                 NMR re-education (38364)       CT progressions:  -ball on wall, CT  -ball on wall, L/R  -plus B shoulder flexion  -seated with elbow resting on thighs, CT       3#   5\"  1  2  1   10  10 B  10  10                                Manual Intervention (88373)       Cerv mobs/manip: downglides B  PA to cervical spine in supine  4'  4'     Thoracic mobs/manip       CT manip       Rib mobilizations       STM: cervical paraspinals      Gentle cervical traction                               Modalities:   2/23: Pt was set up on cervical traction in supine with bolsters under B knees with parameters of 15/10 lbs with on/off time of 30/10, for a total time of 10 minutes. Pt was given panic button as well as instructed how to use it if experiencing pain as well as given bell to call for needs. 2/27: 18/12#, 30/10 on/off, 12 min  3/2: 21/15#, 30/10 on/off, 15 min      Patient education:  -pt educated on diagnosis, prognosis and expectations for rehab  -all pt questions were answered    Home Exercise Program:  Access Code: [email or web address]  URL: [email or web address]  Date: 02/23/2023  Prepared by: Kashif Shell    Exercises  Seated Scapular Retraction - 3 x daily - 7 x weekly - 1 sets - 10 reps - 3-5 hold  Chin Tuck - 3 x daily - 7 x weekly - 1 sets - 10 reps - 5-10 hold  Median Nerve Flossing - Tray - 2-3 x daily - 7 x weekly - 1 sets - 10 reps    Patient Education  Sleep Positions    Therapeutic Exercise and NMR EXR  [x] (18553) Provided verbal/tactile cueing for activities related to strengthening, flexibility, endurance, ROM  for improvements in cervical, postural, scapular, scapulothoracic and UE control with self care, reaching, carrying, lifting, house/yardwork, driving/computer work.    [] (08472) Provided verbal/tactile cueing for activities related to improving balance, coordination, kinesthetic sense, posture, motor skill, proprioception  to assist with cervical, scapular, scapulothoracic and UE control with self care, reaching, carrying, lifting, house/yardwork, driving/computer work.  [] (19634) Therapist is in constant attendance of 2 or more patients providing skilled therapy interventions, but not providing any significant amount of measurable one-on-one time to either patient, for improvements in cervical, scapular, scapulothoracic and UE control with self care, reaching, carrying, lifting, house/yardwork, driving, computer work. Therapeutic Activities:    [] (52982 or 22723) Provided verbal/tactile cueing for activities related to improving balance, coordination, kinesthetic sense, posture, motor skill, proprioception and motor activation to allow for proper function of cervical, scapular, scapulothoracic and UE control with self care, carrying, lifting, driving/computer work.      Home Exercise Program:    [x] (13700) Reviewed/Progressed HEP activities related to strengthening, flexibility, endurance, ROM of cervical, scapular, scapulothoracic and UE control with self care, reaching, carrying, lifting, house/yardwork, driving/computer work  [] (18687) Reviewed/Progressed HEP activities related to improving balance, coordination, kinesthetic sense, posture, motor skill, proprioception of cervical, scapular, scapulothoracic and UE control with self care, reaching, carrying, lifting, house/yardwork, driving/computer work      Manual Treatments:  PROM / STM / Oscillations-Mobs:  G-I, II, III, IV (PA's, Inf., Post.)  [x] (72276) Provided manual therapy to mobilize soft tissue/joints of cervical/CT, scapular GHJ and UE for the purpose of decreasing headache, modulating pain, promoting relaxation,  increasing ROM, reducing/eliminating soft tissue swelling/inflammation/restriction, improving soft tissue extensibility and allowing for proper ROM for normal function with self care, reaching, carrying, lifting, house/yardwork, driving/computer work    Charges:  Timed Code Treatment Minutes: 41   Total Treatment Minutes: 56       [] EVAL - LOW (91889)   [] EVAL - MOD (07937)  [] EVAL - HIGH (41462)  [] RE-EVAL (17783)  [x] KJ(09465) x 1      [] Ionto  [x] NMR (58245) x 1      [] Vaso  [x] Manual (00492) x  1     [] Ultrasound  [] TA x        [x] Mech Traction (83186)  [] Aquatic Therapy x     [] ES (un) (08766):   [] Home Management Training x  [] ES(attended) (02329)   [] Dry Needling 1-2 muscles (69524):  [] Dry Needling 3+ muscles (144939  [] Group:      [] Other:     GOALS:  Patient stated goal: \"pain relief\"  [] Progressing: [] Met: [] Not Met: [] Adjusted    Therapist goals for Patient:   Short Term Goals: To be achieved in: 2 weeks  1. Independent in HEP and progression per patient tolerance, in order to prevent re-injury. [] Progressing: [] Met: [] Not Met: [] Adjusted  2. Patient will have a decrease in pain to facilitate improvement in movement, function, and ADLs as indicated by Functional Deficits. [] Progressing: [] Met: [] Not Met: [] Adjusted    Long Term Goals: To be achieved in: 6 weeks  1. Pt will improve NDI by 10 points to reduce disability and progress towards PLOF. [] Progressing: [] Met: [] Not Met: [] Adjusted  2. Patient will demonstrate increased AROM to [facility] of cervical/thoracic spine to allow for proper joint functioning as indicated by patients Functional Deficits. [] Progressing: [] Met: [] Not Met: [] Adjusted  3. Patient will demonstrate an increase in postural awareness and control and activation of  Deep cervical stabilizers to allow for proper functional mobility as indicated by patients Functional Deficits. [] Progressing: [] Met: [] Not Met: [] Adjusted  4. Patient will return to functional activities including reaching [place], sleeping without increased symptoms or restriction.    [] Progressing: [] Met: [] Not Met: [] Adjusted  5. Patient will have negative ULNT and distraction testing to demonstrate resolution of cervical radiculopathy symptoms. [] Progressing: [] Met: [] Not Met: [] Adjusted     Overall Progression Towards Functional goals/ Treatment Progress Update:  [] Patient is progressing as expected towards functional goals listed. [] Progression is slowed due to complexities/Impairments listed. [] Progression has been slowed due to co-morbidities. [x] Plan just implemented, too soon to assess goals progression <30days   [] Goals require adjustment due to lack of progress  [] Patient is not progressing as expected and requires additional follow up with physician  [] Other    Persisting Functional Limitations/Impairments:  []Sitting []Standing   []Walking []Squatting/bending    []Stairs []ADL's    []Transfers [x]Reaching  []Housework []Lifting  []Driving []Job related tasks  []Sports/Recreation  [x]Sleeping  [x]Other: yardwork    ASSESSMENT:  Pt with improved DCF control and ability to perform cervical retraction with cues. Pt denied pain exacerbation with treatment. Pt demonstrating dec R UE strength with limited RROM R shoulder flexion and ER, impaired scapular mechanics with inc R scapular UR. Pt denies R shoulder injury, just the cervical history as noted in POC note. Pt once again responded well to cervical traction. Will continue to progress postural strengthening and awareness. Treatment/Activity Tolerance:  [x] Patient able to complete tx  [] Patient limited by fatique  [] Patient limited by pain   [] Patient limited by other medical complications  [] Other:     Prognosis: [x] Good [] Fair  [] Poor    Patient Requires Follow-up: [x] Yes  [] No    PLAN: See eval. PT 2x / week for 6 weeks.    [x] Continue per plan of care [] Alter current plan (see comments)  [] Plan of care initiated [] Hold pending MD visit [] Discharge    Electronically signed by: Paddy Banks, PT, DPT, OMT-C      Note: If patient does not return for scheduled/ recommended follow up visits, this note will serve as a discharge from care along with most recent update on progress.

## 2023-03-06 ENCOUNTER — HOSPITAL ENCOUNTER (OUTPATIENT)
Dept: PHYSICAL THERAPY | Age: 68
Setting detail: THERAPIES SERIES
Discharge: HOME | End: 2023-03-06
Payer: COMMERCIAL

## 2023-03-06 PROCEDURE — 97012 MECHANICAL TRACTION THERAPY: CPT

## 2023-03-06 PROCEDURE — 97112 NEUROMUSCULAR REEDUCATION: CPT

## 2023-03-06 PROCEDURE — 97110 THERAPEUTIC EXERCISES: CPT

## 2023-03-06 PROCEDURE — 97140 MANUAL THERAPY 1/> REGIONS: CPT

## 2023-03-06 NOTE — PLAN OF CARE
[address]  Phone: [phone]   Fax: [phone]     Physical Therapy Discharge Summary    Dear Lona Blue MD  ,    We had the pleasure of treating the following patient for physical therapy services at [facility]. A summary of our findings can be found in the discharge summary below. If you have any questions or concerns regarding these findings, please do not hesitate to contact me at the office phone number checked above. Thank you for the referral.     Physician Signature:________________________________Date:__________________  By signing above (or electronic signature), therapists plan is approved by physician      Functional Outcome:     NDI: raw score = 1; dysfunction = 2%    Overall Response to Treatment:   []Patient is responding well to treatment and improvement is noted with regards  to goals   []Patient should continue to improve in reasonable time if they continue HEP   []Patient has plateaued and is no longer responding to skilled PT intervention    []Patient is getting worse and would benefit from return to referring MD   []Patient unable to adhere to initial POC   [x]Other: Pt responded well to skilled PT, symptoms are mostly resolved at this time. Discussed pt getting over door cervical traction device to self-manage symptoms in future as mechanical traction in clinic resolved symptoms quickly. Pt met 8 of 8 goals for therapy. Pt to d/c to HEP, road to wellness pass provided, and will follow up with MD as needed. Date range of Visits: 23  Total Visits: 4    Recommendation:    [x] Discharge to HEP. Follow up with PT or MD PRN.            Physical Therapy Daily Treatment Note    Date:  2023     Patient Name:  Monique Fong    :  1955  MRN: [id number]  Medical Diagnosis:  Cervical radiculopathy [M54.12]  Treatment Diagnosis: dec cervical ROM and hypomobility, impaired posture and DCF control with (+) s/s for cervical radiculopathy     Insurance/Certification information:  PT Insurance Information: [employer] - $40 copay, no auth req  Physician Information:  Ace Rice MD    Plan of care signed (Y/N): [x]  Yes []  No     Date of Patient follow up with Physician:      Progress Report: []  Yes  [x]  No     Date Range for reporting period:  Beginnin2023  Ending: 3/6/23    Progress report due (10 Rx/or 30 days whichever is less): visit #10 or  (date)     Recertification due (POC duration/ or 90 days whichever is less): visit #12 or 23 (date)     Visit # Insurance Allowable Auth required? Date Range   4 [place] []  Yes  [x]  No        Latex Allergy:  [x]NO      []YES  Preferred Language for Healthcare:   [x]English       []other:    Functional Scale:       Date assessed:  NDI: raw score = 7; dysfunction = 14%  23  NDI: raw score = 1; dysfunction = 2%  23    Pain level:  1/10     SUBJECTIVE:  Pt reports he feels about as good as he is going to get, ready for d/c.     OBJECTIVE:   : mild FHP  3/: dec R ER strength and AROM    3/6 (d/c):  CERV ROM       Cervical Flexion 30     Cervical Extension 50     Cervical SB 23 R, 31 L     Cervical rotation 60 R, 51 L             ROM Left Right   Shoulder Flex Grossly WFL Grossly [facility]   Shoulder Abd       Shoulder ER       Shoulder IR                       Strength / Myotomes Left Right   Cervical Flexion (C1-2) Grossly 5/5  Grossly 5/5 except:   Cervical Side-bending (C3)       Shoulder Shrug (C4)       Shoulder Flex       Shoulder Scap       Shoulder Abduction (C5)   4+   Shoulder ER   5   Bolded = improvements from last PN/POC      RESTRICTIONS/PRECAUTIONS: HTN, OA, low back surgery in  (\"cleaned it out\"), iliac stents    Exercises/Interventions:   Therapeutic Exercise (50737) Resistance / level Sets/sec Reps Notes   UBE: fwd/bwd  2' ea            Doorway pec stretch  20\" 3    CC:  -LPD  -high row  -mid row   40#  40#  40#   2  2  2   15  15  15    TB horiz abd  TB ER with scap retraction  TB I's Lime  Lime  Orange 2  2  10  10     Wall slide + low trap iso lift off  3\" 15 3/2                        Therapeutic Activities (09459)                                                 NMR re-education (07450)       CT progressions:  -ball on wall, CT  -ball on wall, L/R  -plus B shoulder flexion  -seated with elbow resting on thighs, CT       3#   10\"  1  2  1   5  10 B  10  10                                Manual Intervention (68080)       Cerv mobs/manip: downglides B  PA to cervical spine in supine  4'  4'     Thoracic mobs/manip       CT manip       Rib mobilizations       STM: cervical paraspinals      Gentle cervical traction                               Modalities:   2/23: Pt was set up on cervical traction in supine with bolsters under B knees with parameters of 15/10 lbs with on/off time of 30/10, for a total time of 10 minutes. Pt was given panic button as well as instructed how to use it if experiencing pain as well as given bell to call for needs. 2/27: 18/12#, 30/10 on/off, 12 min  3/2: 21/15#, 30/10 on/off, 15 min  3/6: 18/12#, 30/10 on/off, 15 min      Patient education:  -pt educated on diagnosis, prognosis and expectations for rehab  -all pt questions were answered    Home Exercise Program:  Access Code: [email or web address]  URL: [email or web address]  Date: 02/23/2023  Prepared by: Shraddha Brochure    Exercises  Seated Scapular Retraction - 3 x daily - 7 x weekly - 1 sets - 10 reps - 3-5 hold  Chin Tuck - 3 x daily - 7 x weekly - 1 sets - 10 reps - 5-10 hold  Median Nerve Flossing - Tray - 2-3 x daily - 7 x weekly - 1 sets - 10 reps    Patient Education  Sleep Positions    Therapeutic Exercise and NMR EXR  [x] (20704) Provided verbal/tactile cueing for activities related to strengthening, flexibility, endurance, ROM  for improvements in cervical, postural, scapular, scapulothoracic and UE control with self care, reaching, carrying, lifting, house/yardwork, driving/computer work.    [] (44381) Provided verbal/tactile cueing for activities related to improving balance, coordination, kinesthetic sense, posture, motor skill, proprioception  to assist with cervical, scapular, scapulothoracic and UE control with self care, reaching, carrying, lifting, house/yardwork, driving/computer work.  [] (25638) Therapist is in constant attendance of 2 or more patients providing skilled therapy interventions, but not providing any significant amount of measurable one-on-one time to either patient, for improvements in cervical, scapular, scapulothoracic and UE control with self care, reaching, carrying, lifting, house/yardwork, driving, computer work. Therapeutic Activities:    [] (43809 or 80986) Provided verbal/tactile cueing for activities related to improving balance, coordination, kinesthetic sense, posture, motor skill, proprioception and motor activation to allow for proper function of cervical, scapular, scapulothoracic and UE control with self care, carrying, lifting, driving/computer work.      Home Exercise Program:    [x] (53648) Reviewed/Progressed HEP activities related to strengthening, flexibility, endurance, ROM of cervical, scapular, scapulothoracic and UE control with self care, reaching, carrying, lifting, house/yardwork, driving/computer work  [] (43044) Reviewed/Progressed HEP activities related to improving balance, coordination, kinesthetic sense, posture, motor skill, proprioception of cervical, scapular, scapulothoracic and UE control with self care, reaching, carrying, lifting, house/yardwork, driving/computer work      Manual Treatments:  PROM / STM / Oscillations-Mobs:  G-I, II, III, IV (PA's, Inf., Post.)  [x] (90156) Provided manual therapy to mobilize soft tissue/joints of cervical/CT, scapular GHJ and UE for the purpose of decreasing headache, modulating pain, promoting relaxation,  increasing ROM, reducing/eliminating soft tissue swelling/inflammation/restriction, improving soft tissue extensibility and allowing for proper ROM for normal function with self care, reaching, carrying, lifting, house/yardwork, driving/computer work    Charges:  Timed Code Treatment Minutes: 38   Total Treatment Minutes: 53       [] EVAL - LOW (04785)   [] EVAL - MOD (44714)  [] EVAL - HIGH (80403)  [] RE-EVAL (63654)  [x] YV(91467) x 1      [] Ionto  [x] NMR (67355) x 1      [] Vaso  [x] Manual (20258) x  1     [] Ultrasound  [] TA x        [x] Mech Traction (68934)  [] Aquatic Therapy x     [] ES (un) (37098):   [] Home Management Training x  [] ES(attended) (07524)   [] Dry Needling 1-2 muscles (15014):  [] Dry Needling 3+ muscles (905355  [] Group:      [] Other:     GOALS:  Patient stated goal: \"pain relief\"  [] Progressing: [x] Met: [] Not Met: [] Adjusted    Therapist goals for Patient:   Short Term Goals: To be achieved in: 2 weeks  1. Independent in HEP and progression per patient tolerance, in order to prevent re-injury. [] Progressing: [x] Met: [] Not Met: [] Adjusted  2. Patient will have a decrease in pain to facilitate improvement in movement, function, and ADLs as indicated by Functional Deficits. [] Progressing: [x] Met: [] Not Met: [] Adjusted    Long Term Goals: To be achieved in: 6 weeks  1. Pt will improve NDI by 10 points to reduce disability and progress towards PLOF. [] Progressing: [x] Met: [] Not Met: [] Adjusted  2. Patient will demonstrate increased AROM to [facility] of cervical/thoracic spine to allow for proper joint functioning as indicated by patients Functional Deficits. [] Progressing: [x] Met: [] Not Met: [] Adjusted  3. Patient will demonstrate an increase in postural awareness and control and activation of  Deep cervical stabilizers to allow for proper functional mobility as indicated by patients Functional Deficits. [] Progressing: [x] Met: [] Not Met: [] Adjusted  4.  Patient will return to functional activities including reaching [place], sleeping without increased symptoms or restriction. [] Progressing: [x] Met: [] Not Met: [] Adjusted  5. Patient will have negative ULNT and distraction testing to demonstrate resolution of cervical radiculopathy symptoms. [] Progressing: [x] Met: [] Not Met: [] Adjusted     Overall Progression Towards Functional goals/ Treatment Progress Update:  [x] Patient is progressing as expected towards functional goals listed. [] Progression is slowed due to complexities/Impairments listed. [] Progression has been slowed due to co-morbidities. [] Plan just implemented, too soon to assess goals progression <30days   [] Goals require adjustment due to lack of progress  [] Patient is not progressing as expected and requires additional follow up with physician  [] Other    Persisting Functional Limitations/Impairments:  []Sitting []Standing   []Walking []Squatting/bending    []Stairs []ADL's    []Transfers [x]Reaching  []Housework []Lifting  []Driving []Job related tasks  []Sports/Recreation  [x]Sleeping  [x]Other: yardwork    ASSESSMENT:  d/c completed, see above for details. Treatment/Activity Tolerance:  [x] Patient able to complete tx  [] Patient limited by fatique  [] Patient limited by pain   [] Patient limited by other medical complications  [] Other:     Prognosis: [x] Good [] Fair  [] Poor    Patient Requires Follow-up: [x] Yes  [] No    PLAN: See manav PT 2x / week for 6 weeks. [] Continue per plan of care [] Alter current plan (see comments)  [] Plan of care initiated [] Hold pending MD visit [x] Discharge    Electronically signed by: Debbie Melchor, PT, DPT, OMT-C      Note: If patient does not return for scheduled/ recommended follow up visits, this note will serve as a discharge from care along with most recent update on progress.

## 2023-03-09 ENCOUNTER — APPOINTMENT (OUTPATIENT)
Dept: PHYSICAL THERAPY | Age: 68
End: 2023-03-09
Payer: COMMERCIAL

## 2023-03-13 ENCOUNTER — APPOINTMENT (OUTPATIENT)
Dept: PHYSICAL THERAPY | Age: 68
End: 2023-03-13
Payer: COMMERCIAL

## 2023-03-16 ENCOUNTER — APPOINTMENT (OUTPATIENT)
Dept: PHYSICAL THERAPY | Age: 68
End: 2023-03-16
Payer: COMMERCIAL

## 2023-05-31 ENCOUNTER — OFFICE VISIT (OUTPATIENT)
Dept: INTERNAL MEDICINE CLINIC | Age: 68
End: 2023-05-31
Payer: COMMERCIAL

## 2023-05-31 VITALS
SYSTOLIC BLOOD PRESSURE: 130 MMHG | BODY MASS INDEX: 24.42 KG/M2 | HEIGHT: 71 IN | HEART RATE: 81 BPM | OXYGEN SATURATION: 98 % | DIASTOLIC BLOOD PRESSURE: 70 MMHG | WEIGHT: 174.4 LBS

## 2023-05-31 DIAGNOSIS — M54.12 CERVICAL RADICULOPATHY: Primary | ICD-10-CM

## 2023-05-31 DIAGNOSIS — I73.9 PVD (PERIPHERAL VASCULAR DISEASE) (HCC): ICD-10-CM

## 2023-05-31 DIAGNOSIS — K21.9 GASTROESOPHAGEAL REFLUX DISEASE WITHOUT ESOPHAGITIS: ICD-10-CM

## 2023-05-31 DIAGNOSIS — I10 PRIMARY HYPERTENSION: ICD-10-CM

## 2023-05-31 DIAGNOSIS — R73.01 IMPAIRED FASTING BLOOD SUGAR: ICD-10-CM

## 2023-05-31 DIAGNOSIS — E78.2 MIXED HYPERLIPIDEMIA: ICD-10-CM

## 2023-05-31 PROCEDURE — 1123F ACP DISCUSS/DSCN MKR DOCD: CPT | Performed by: INTERNAL MEDICINE

## 2023-05-31 PROCEDURE — 3075F SYST BP GE 130 - 139MM HG: CPT | Performed by: INTERNAL MEDICINE

## 2023-05-31 PROCEDURE — 99214 OFFICE O/P EST MOD 30 MIN: CPT | Performed by: INTERNAL MEDICINE

## 2023-05-31 PROCEDURE — 3078F DIAST BP <80 MM HG: CPT | Performed by: INTERNAL MEDICINE

## 2023-05-31 RX ORDER — LOSARTAN POTASSIUM 50 MG/1
TABLET ORAL
Qty: 90 TABLET | Refills: 1 | Status: SHIPPED | OUTPATIENT
Start: 2023-05-31

## 2023-05-31 RX ORDER — OMEPRAZOLE 20 MG/1
CAPSULE, DELAYED RELEASE ORAL
Qty: 90 CAPSULE | Refills: 1 | Status: SHIPPED | OUTPATIENT
Start: 2023-05-31

## 2023-05-31 RX ORDER — HYDROCHLOROTHIAZIDE 25 MG/1
TABLET ORAL
Qty: 90 TABLET | Refills: 1 | Status: SHIPPED | OUTPATIENT
Start: 2023-05-31

## 2023-05-31 RX ORDER — ATORVASTATIN CALCIUM 20 MG/1
20 TABLET, FILM COATED ORAL DAILY
Qty: 90 TABLET | Refills: 1 | Status: SHIPPED | OUTPATIENT
Start: 2023-05-31

## 2023-05-31 ASSESSMENT — ENCOUNTER SYMPTOMS
COUGH: 0
CONSTIPATION: 0
ABDOMINAL PAIN: 0
CHEST TIGHTNESS: 0
NAUSEA: 0
WHEEZING: 0
SHORTNESS OF BREATH: 0
SORE THROAT: 0
VOMITING: 0
BACK PAIN: 0
COLOR CHANGE: 0

## 2023-05-31 NOTE — ASSESSMENT & PLAN NOTE
Results of lipid profile reviewed and are at target goal, continue current dose of atorvastatin, reinforced recommendations for healthy low-salt low-fat diet and active lifestyle

## 2023-05-31 NOTE — PROGRESS NOTES
ASSESSMENT/PLAN:  1. Cervical radiculopathy  Assessment & Plan:  Symptoms improved significantly, encouraged to adhere to home PT exercises and call the office if any relapse or new symptoms  2. Primary hypertension  Assessment & Plan:   Blood pressure stable on current combination of losartan with hydrochlorothiazide, will refill and continue same, update labs next visit, reinforced recommendations for low-salt low-fat low-carb diet with active lifestyle  Orders:  -     losartan (COZAAR) 50 MG tablet; TAKE 1 TABLET DAILY, Disp-90 tablet, R-1Patient must establish with a new provider for future refills. Normal  -     hydroCHLOROthiazide (HYDRODIURIL) 25 MG tablet; TAKE 1 TABLET DAILY, Disp-90 tablet, R-1Normal  3. PVD (peripheral vascular disease) ([address]  Assessment & Plan:   Remains stable and mostly asymptomatic, continue daily aspirin and atorvastatin, encouraged abstinence from smoking  Orders:  -     atorvastatin (LIPITOR) 20 MG tablet; Take 1 tablet by mouth daily, Disp-90 tablet, R-1Patient must establish with a new provider for future refills. Normal  4. Mixed hyperlipidemia  Assessment & Plan:   Results of lipid profile reviewed and are at target goal, continue current dose of atorvastatin, reinforced recommendations for healthy low-salt low-fat diet and active lifestyle  Orders:  -     atorvastatin (LIPITOR) 20 MG tablet; Take 1 tablet by mouth daily, Disp-90 tablet, R-1Patient must establish with a new provider for future refills. Normal  5. Gastroesophageal reflux disease without esophagitis  -     omeprazole (PRILOSEC) 20 MG delayed release capsule; TAKE 1 CAPSULE EVERY       MORNING BEFORE BREAKFAST, Disp-90 capsule, R-1Normal  6. Impaired fasting blood sugar  Assessment & Plan:  Fasting glucose remains higher than target goal however normal hemoglobin A1c, reinforced low-carb diet and active lifestyle, update labs next visit       Return for AWV please schedule for feb/2024.      SUBJECTIVE  HPI:   Return

## 2023-05-31 NOTE — ASSESSMENT & PLAN NOTE
Symptoms improved significantly, encouraged to adhere to home PT exercises and call the office if any relapse or new symptoms

## 2023-05-31 NOTE — ASSESSMENT & PLAN NOTE
Remains stable and mostly asymptomatic, continue daily aspirin and atorvastatin, encouraged abstinence from smoking

## 2023-05-31 NOTE — ASSESSMENT & PLAN NOTE
Fasting glucose remains higher than target goal however normal hemoglobin A1c, reinforced low-carb diet and active lifestyle, update labs next visit

## 2023-05-31 NOTE — ASSESSMENT & PLAN NOTE
Blood pressure stable on current combination of losartan with hydrochlorothiazide, will refill and continue same, update labs next visit, reinforced recommendations for low-salt low-fat low-carb diet with active lifestyle

## 2023-12-08 DIAGNOSIS — I73.9 PVD (PERIPHERAL VASCULAR DISEASE) (HCC): ICD-10-CM

## 2023-12-08 DIAGNOSIS — E78.2 MIXED HYPERLIPIDEMIA: ICD-10-CM

## 2023-12-08 RX ORDER — ATORVASTATIN CALCIUM 20 MG/1
20 TABLET, FILM COATED ORAL DAILY
Qty: 90 TABLET | Refills: 0 | Status: SHIPPED | OUTPATIENT
Start: 2023-12-08

## 2024-01-25 DIAGNOSIS — K21.9 GASTROESOPHAGEAL REFLUX DISEASE WITHOUT ESOPHAGITIS: ICD-10-CM

## 2024-01-25 DIAGNOSIS — I10 PRIMARY HYPERTENSION: ICD-10-CM

## 2024-01-25 RX ORDER — OMEPRAZOLE 20 MG/1
CAPSULE, DELAYED RELEASE ORAL
Qty: 90 CAPSULE | Refills: 0 | Status: SHIPPED | OUTPATIENT
Start: 2024-01-25

## 2024-01-25 RX ORDER — LOSARTAN POTASSIUM 50 MG/1
TABLET ORAL
Qty: 90 TABLET | Refills: 0 | Status: SHIPPED | OUTPATIENT
Start: 2024-01-25

## 2024-01-25 RX ORDER — HYDROCHLOROTHIAZIDE 25 MG/1
TABLET ORAL
Qty: 90 TABLET | Refills: 0 | Status: SHIPPED | OUTPATIENT
Start: 2024-01-25

## 2024-01-25 NOTE — TELEPHONE ENCOUNTER
Last OV: 5/31/2023  Next OV: Visit date not found    Next appointment due: 02/2024 for AWV    Last fill: 05/31/2023  Refills: 1

## 2024-01-25 NOTE — TELEPHONE ENCOUNTER
Please remind patient he is due for updated Medicare wellness exam in February, he needs to schedule that to ensure continuity of  medication refills

## 2024-02-17 SDOH — HEALTH STABILITY: PHYSICAL HEALTH: ON AVERAGE, HOW MANY MINUTES DO YOU ENGAGE IN EXERCISE AT THIS LEVEL?: 30 MIN

## 2024-02-17 SDOH — HEALTH STABILITY: PHYSICAL HEALTH: ON AVERAGE, HOW MANY DAYS PER WEEK DO YOU ENGAGE IN MODERATE TO STRENUOUS EXERCISE (LIKE A BRISK WALK)?: 3 DAYS

## 2024-02-17 ASSESSMENT — LIFESTYLE VARIABLES
HOW OFTEN DURING THE LAST YEAR HAVE YOU NEEDED AN ALCOHOLIC DRINK FIRST THING IN THE MORNING TO GET YOURSELF GOING AFTER A NIGHT OF HEAVY DRINKING: 0
HOW OFTEN DO YOU HAVE SIX OR MORE DRINKS ON ONE OCCASION: 3
HAVE YOU OR SOMEONE ELSE BEEN INJURED AS A RESULT OF YOUR DRINKING: NO
HAS A RELATIVE, FRIEND, DOCTOR, OR ANOTHER HEALTH PROFESSIONAL EXPRESSED CONCERN ABOUT YOUR DRINKING OR SUGGESTED YOU CUT DOWN: 0
HOW OFTEN DO YOU HAVE A DRINK CONTAINING ALCOHOL: 4 OR MORE TIMES A WEEK
HAS A RELATIVE, FRIEND, DOCTOR, OR ANOTHER HEALTH PROFESSIONAL EXPRESSED CONCERN ABOUT YOUR DRINKING OR SUGGESTED YOU CUT DOWN: NO
HOW OFTEN DURING THE LAST YEAR HAVE YOU FAILED TO DO WHAT WAS NORMALLY EXPECTED FROM YOU BECAUSE OF DRINKING: NEVER
HAVE YOU OR SOMEONE ELSE BEEN INJURED AS A RESULT OF YOUR DRINKING: 0
HOW OFTEN DURING THE LAST YEAR HAVE YOU FOUND THAT YOU WERE NOT ABLE TO STOP DRINKING ONCE YOU HAD STARTED: 0
HOW OFTEN DURING THE LAST YEAR HAVE YOU BEEN UNABLE TO REMEMBER WHAT HAPPENED THE NIGHT BEFORE BECAUSE YOU HAD BEEN DRINKING: NEVER
HOW OFTEN DURING THE LAST YEAR HAVE YOU HAD A FEELING OF GUILT OR REMORSE AFTER DRINKING: MONTHLY
HOW OFTEN DURING THE LAST YEAR HAVE YOU NEEDED AN ALCOHOLIC DRINK FIRST THING IN THE MORNING TO GET YOURSELF GOING AFTER A NIGHT OF HEAVY DRINKING: NEVER
HOW OFTEN DURING THE LAST YEAR HAVE YOU HAD A FEELING OF GUILT OR REMORSE AFTER DRINKING: 2
HOW OFTEN DO YOU HAVE A DRINK CONTAINING ALCOHOL: 5
HOW OFTEN DURING THE LAST YEAR HAVE YOU FOUND THAT YOU WERE NOT ABLE TO STOP DRINKING ONCE YOU HAD STARTED: NEVER
HOW OFTEN DURING THE LAST YEAR HAVE YOU BEEN UNABLE TO REMEMBER WHAT HAPPENED THE NIGHT BEFORE BECAUSE YOU HAD BEEN DRINKING: 0
HOW OFTEN DURING THE LAST YEAR HAVE YOU FAILED TO DO WHAT WAS NORMALLY EXPECTED FROM YOU BECAUSE OF DRINKING: 0
HOW MANY STANDARD DRINKS CONTAINING ALCOHOL DO YOU HAVE ON A TYPICAL DAY: 2
HOW MANY STANDARD DRINKS CONTAINING ALCOHOL DO YOU HAVE ON A TYPICAL DAY: 3 OR 4

## 2024-02-17 ASSESSMENT — PATIENT HEALTH QUESTIONNAIRE - PHQ9
SUM OF ALL RESPONSES TO PHQ QUESTIONS 1-9: 0
SUM OF ALL RESPONSES TO PHQ QUESTIONS 1-9: 0
1. LITTLE INTEREST OR PLEASURE IN DOING THINGS: 0
2. FEELING DOWN, DEPRESSED OR HOPELESS: 0
SUM OF ALL RESPONSES TO PHQ9 QUESTIONS 1 & 2: 0
SUM OF ALL RESPONSES TO PHQ QUESTIONS 1-9: 0
SUM OF ALL RESPONSES TO PHQ QUESTIONS 1-9: 0

## 2024-02-20 ENCOUNTER — OFFICE VISIT (OUTPATIENT)
Dept: INTERNAL MEDICINE CLINIC | Age: 69
End: 2024-02-20

## 2024-02-20 VITALS
BODY MASS INDEX: 24.41 KG/M2 | SYSTOLIC BLOOD PRESSURE: 148 MMHG | HEART RATE: 97 BPM | DIASTOLIC BLOOD PRESSURE: 75 MMHG | WEIGHT: 175 LBS | OXYGEN SATURATION: 97 %

## 2024-02-20 DIAGNOSIS — Z00.00 MEDICARE ANNUAL WELLNESS VISIT, SUBSEQUENT: Primary | ICD-10-CM

## 2024-02-20 DIAGNOSIS — I73.9 PVD (PERIPHERAL VASCULAR DISEASE) (HCC): ICD-10-CM

## 2024-02-20 DIAGNOSIS — E78.2 MIXED HYPERLIPIDEMIA: ICD-10-CM

## 2024-02-20 DIAGNOSIS — I10 PRIMARY HYPERTENSION: ICD-10-CM

## 2024-02-20 DIAGNOSIS — N40.0 BENIGN PROSTATIC HYPERPLASIA WITHOUT LOWER URINARY TRACT SYMPTOMS: ICD-10-CM

## 2024-02-20 DIAGNOSIS — R73.01 IMPAIRED FASTING BLOOD SUGAR: ICD-10-CM

## 2024-02-20 SDOH — ECONOMIC STABILITY: INCOME INSECURITY: HOW HARD IS IT FOR YOU TO PAY FOR THE VERY BASICS LIKE FOOD, HOUSING, MEDICAL CARE, AND HEATING?: NOT HARD AT ALL

## 2024-02-20 SDOH — ECONOMIC STABILITY: FOOD INSECURITY: WITHIN THE PAST 12 MONTHS, YOU WORRIED THAT YOUR FOOD WOULD RUN OUT BEFORE YOU GOT MONEY TO BUY MORE.: NEVER TRUE

## 2024-02-20 SDOH — ECONOMIC STABILITY: FOOD INSECURITY: WITHIN THE PAST 12 MONTHS, THE FOOD YOU BOUGHT JUST DIDN'T LAST AND YOU DIDN'T HAVE MONEY TO GET MORE.: NEVER TRUE

## 2024-02-20 ASSESSMENT — ENCOUNTER SYMPTOMS
BACK PAIN: 0
CHEST TIGHTNESS: 0
SORE THROAT: 0
NAUSEA: 0
SINUS PRESSURE: 0
ABDOMINAL PAIN: 0
SHORTNESS OF BREATH: 0
COLOR CHANGE: 0
WHEEZING: 0
CONSTIPATION: 0
VOMITING: 0
COUGH: 0

## 2024-02-20 NOTE — PROGRESS NOTES
Medicare Annual Wellness Visit  Name: Clemente Reddy Today’s Date: 2024   MRN: [id number] Sex: Male   Age: 68 y.o. Ethnicity: Non- / Non    : 1955 Race: White (non-)      Clemente Reddy is here for Medicare AWV     Screenings for behavioral, psychosocial and functional/safety risks, and cognitive dysfunction are all negative except as indicated below. These results, as well as other patient data from the Health Risk Assessment form, are documented in Flowsheets linked to this Encounter.    Allergies   Allergen Reactions    Chantix [Varenicline]      Pananoia       Prior to Visit Medications    Medication Sig Taking? Authorizing Provider   omeprazole (PRILOSEC) 20 MG delayed release capsule TAKE 1 CAPSULE EVERY       MORNING BEFORE BREAKFAST Yes Shyla Medrano MD   hydroCHLOROthiazide (HYDRODIURIL) 25 MG tablet TAKE 1 TABLET DAILY Yes Shyla Medrano MD   losartan (COZAAR) 50 MG tablet TAKE 1 TABLET DAILY Yes Shyla Medrano MD   atorvastatin (LIPITOR) 20 MG tablet TAKE 1 TABLET DAILY Yes Shyla Medrano MD   Cholecalciferol (VITAMIN D) 50 MCG ( UT) CAPS capsule Take by mouth daily Yes Jose Miguel Fernandez MD   aspirin 81 MG tablet Take 1 tablet by mouth daily Yes ProviderJose Miguel MD       Past Medical History:   Diagnosis Date    Colon polyp     GERD (gastroesophageal reflux disease)     Hyperlipidemia     Hypertension     PVD (peripheral vascular disease) (HCC)      Past Surgical History:   Procedure Laterality Date    BACK SURGERY  2013    Dr. Godinez    COLONOSCOPY      COLONOSCOPY N/A 2022    COLONOSCOPY DIAGNOSTIC performed by Godwin Zepeda MD at [facility] ENDOSCOPY    CYST REMOVAL  posterior right ear     LAPAROSCOPIC APPENDECTOMY N/A 2022    LAPAROSCOPIC APPENDECTOMY performed by Bennie Mckeon MD at [facility] OR    OTHER SURGICAL HISTORY Bilateral 2013    Dr. Duarte; Iliac stents    TONSILLECTOMY         Family History

## 2024-02-20 NOTE — ASSESSMENT & PLAN NOTE
blood pressure checked twice remained borderline elevated, advised patient to start logging ambulatory blood pressure readings at home for the next 2 weeks and send the readings through [employer], if remains borderline will need medication adjustment.  Update labs and reevaluate in 6 months

## 2024-02-20 NOTE — ASSESSMENT & PLAN NOTE
s/p stent placement, no longer following up with vascular surgery, reports symptoms stable and continues to be maintained on daily aspirin and statin.

## 2024-02-23 DIAGNOSIS — I10 PRIMARY HYPERTENSION: ICD-10-CM

## 2024-02-23 DIAGNOSIS — R73.01 IMPAIRED FASTING BLOOD SUGAR: ICD-10-CM

## 2024-02-23 DIAGNOSIS — E78.2 MIXED HYPERLIPIDEMIA: ICD-10-CM

## 2024-02-23 DIAGNOSIS — N40.0 BENIGN PROSTATIC HYPERPLASIA WITHOUT LOWER URINARY TRACT SYMPTOMS: ICD-10-CM

## 2024-02-23 LAB
ALBUMIN SERPL-MCNC: 4 G/DL (ref 3.4–5)
ALBUMIN/GLOB SERPL: 1.7 {RATIO} (ref 1.1–2.2)
ALP SERPL-CCNC: 60 U/L (ref 40–129)
ALT SERPL-CCNC: 11 U/L (ref 10–40)
ANION GAP SERPL CALCULATED.3IONS-SCNC: 11 MMOL/L (ref 3–16)
AST SERPL-CCNC: 20 U/L (ref 15–37)
BILIRUB SERPL-MCNC: 0.5 MG/DL (ref 0–1)
BUN SERPL-MCNC: 17 MG/DL (ref 7–20)
CALCIUM SERPL-MCNC: 8.7 MG/DL (ref 8.3–10.6)
CHLORIDE SERPL-SCNC: 101 MMOL/L (ref 99–110)
CHOLEST SERPL-MCNC: 152 MG/DL (ref 0–199)
CO2 SERPL-SCNC: 29 MMOL/L (ref 21–32)
CREAT SERPL-MCNC: 1.1 MG/DL (ref 0.8–1.3)
DEPRECATED RDW RBC AUTO: 13.1 % (ref 12.4–15.4)
GFR SERPLBLD CREATININE-BSD FMLA CKD-EPI: >60 ML/MIN/{1.73_M2}
GLUCOSE SERPL-MCNC: 107 MG/DL (ref 70–99)
HCT VFR BLD AUTO: 38.7 % (ref 40.5–52.5)
HDLC SERPL-MCNC: 64 MG/DL (ref 40–60)
HGB BLD-MCNC: 13.1 G/DL (ref 13.5–17.5)
LDLC SERPL CALC-MCNC: 60 MG/DL
MCH RBC QN AUTO: 33.4 PG (ref 26–34)
MCHC RBC AUTO-ENTMCNC: 33.9 G/DL (ref 31–36)
MCV RBC AUTO: 98.5 FL (ref 80–100)
PLATELET # BLD AUTO: 244 K/UL (ref 135–450)
PMV BLD AUTO: 10.1 FL (ref 5–10.5)
POTASSIUM SERPL-SCNC: 3.7 MMOL/L (ref 3.5–5.1)
PROT SERPL-MCNC: 6.3 G/DL (ref 6.4–8.2)
PSA SERPL DL<=0.01 NG/ML-MCNC: 2.27 NG/ML (ref 0–4)
RBC # BLD AUTO: 3.93 M/UL (ref 4.2–5.9)
SODIUM SERPL-SCNC: 141 MMOL/L (ref 136–145)
TRIGL SERPL-MCNC: 142 MG/DL (ref 0–150)
TSH SERPL DL<=0.005 MIU/L-ACNC: 0.93 UIU/ML (ref 0.27–4.2)
VLDLC SERPL CALC-MCNC: 28 MG/DL
WBC # BLD AUTO: 5.2 K/UL (ref 4–11)

## 2024-02-24 LAB
EST. AVERAGE GLUCOSE BLD GHB EST-MCNC: 105.4 MG/DL
HBA1C MFR BLD: 5.3 %

## 2024-02-29 DIAGNOSIS — E78.2 MIXED HYPERLIPIDEMIA: ICD-10-CM

## 2024-02-29 DIAGNOSIS — I73.9 PVD (PERIPHERAL VASCULAR DISEASE) (HCC): ICD-10-CM

## 2024-02-29 RX ORDER — ATORVASTATIN CALCIUM 20 MG/1
20 TABLET, FILM COATED ORAL DAILY
Qty: 90 TABLET | Refills: 1 | Status: SHIPPED | OUTPATIENT
Start: 2024-02-29

## 2024-02-29 NOTE — TELEPHONE ENCOUNTER
Future Appointments    Encounter Information   Provider Department Appt Notes   8/20/2024 Shyla Medrano MD [facility] Internal Medicine 6 months     Past Visits    Date Provider Specialty Visit Type Primary Dx   02/20/2024 Shyla Medrano MD Internal Medicine Office Visit Medicare annual wellness visit, subsequent

## 2024-03-01 ENCOUNTER — PATIENT MESSAGE (OUTPATIENT)
Dept: INTERNAL MEDICINE CLINIC | Age: 69
End: 2024-03-01

## 2024-03-04 NOTE — TELEPHONE ENCOUNTER
From: Clemente Reddy  To: Dr. Shyla Medrano  Sent: 3/1/2024 4:21 PM EST  Subject: Blood pressure readings     Blood pressure readings. 2/21 5:00 am 117/72. 5:30 pm. 118/70. 2/22 6:30am 122/70. 3:30pm 113/68. 2/23 11:00am 125/79. 4:00pm 123/72 2/24 9:00am. 120/71. 3:00pm 113/71 2/25 119/73. 2/26 10:15am 113/68 4:15 pm 125/71. 2/27. 7:00 am 109/69. 7:00 pm 109/75. 2/28 6:00 am113/69. 3:00 pm 121/67

## 2024-03-04 NOTE — TELEPHONE ENCOUNTER
Blood pressure readings are good, continue current medications and can continue checking blood pressure periodically until his next appointment to ensure it remains under control

## 2024-04-24 DIAGNOSIS — K21.9 GASTROESOPHAGEAL REFLUX DISEASE WITHOUT ESOPHAGITIS: ICD-10-CM

## 2024-04-24 DIAGNOSIS — I10 PRIMARY HYPERTENSION: ICD-10-CM

## 2024-04-24 RX ORDER — OMEPRAZOLE 20 MG/1
CAPSULE, DELAYED RELEASE ORAL
Qty: 90 CAPSULE | Refills: 0 | Status: SHIPPED | OUTPATIENT
Start: 2024-04-24

## 2024-04-24 RX ORDER — HYDROCHLOROTHIAZIDE 25 MG/1
TABLET ORAL
Qty: 90 TABLET | Refills: 0 | Status: SHIPPED | OUTPATIENT
Start: 2024-04-24

## 2024-04-24 RX ORDER — LOSARTAN POTASSIUM 50 MG/1
TABLET ORAL
Qty: 90 TABLET | Refills: 0 | Status: SHIPPED | OUTPATIENT
Start: 2024-04-24

## 2024-07-23 DIAGNOSIS — K21.9 GASTROESOPHAGEAL REFLUX DISEASE WITHOUT ESOPHAGITIS: ICD-10-CM

## 2024-07-23 DIAGNOSIS — I10 PRIMARY HYPERTENSION: ICD-10-CM

## 2024-07-23 RX ORDER — OMEPRAZOLE 20 MG/1
CAPSULE, DELAYED RELEASE ORAL
Qty: 90 CAPSULE | Refills: 0 | Status: SHIPPED | OUTPATIENT
Start: 2024-07-23

## 2024-07-23 RX ORDER — LOSARTAN POTASSIUM 50 MG/1
TABLET ORAL
Qty: 90 TABLET | Refills: 0 | Status: SHIPPED | OUTPATIENT
Start: 2024-07-23

## 2024-07-23 RX ORDER — HYDROCHLOROTHIAZIDE 25 MG/1
TABLET ORAL
Qty: 90 TABLET | Refills: 0 | Status: SHIPPED | OUTPATIENT
Start: 2024-07-23

## 2024-07-23 NOTE — TELEPHONE ENCOUNTER
Last OV: 2/20/2024  Next OV: 8/20/2024    Next appointment due:around 8/20/2024     Last fill:4/24/24  Refills:0#90

## 2024-08-20 ENCOUNTER — OFFICE VISIT (OUTPATIENT)
Dept: INTERNAL MEDICINE CLINIC | Age: 69
End: 2024-08-20

## 2024-08-20 VITALS
SYSTOLIC BLOOD PRESSURE: 140 MMHG | HEART RATE: 86 BPM | OXYGEN SATURATION: 99 % | BODY MASS INDEX: 24.1 KG/M2 | DIASTOLIC BLOOD PRESSURE: 76 MMHG | WEIGHT: 172.8 LBS

## 2024-08-20 DIAGNOSIS — I73.9 PVD (PERIPHERAL VASCULAR DISEASE) (HCC): ICD-10-CM

## 2024-08-20 DIAGNOSIS — E78.2 MIXED HYPERLIPIDEMIA: ICD-10-CM

## 2024-08-20 DIAGNOSIS — I10 PRIMARY HYPERTENSION: Primary | ICD-10-CM

## 2024-08-20 RX ORDER — ATORVASTATIN CALCIUM 20 MG/1
20 TABLET, FILM COATED ORAL DAILY
Qty: 90 TABLET | Refills: 1 | Status: SHIPPED | OUTPATIENT
Start: 2024-08-20

## 2024-08-20 ASSESSMENT — ENCOUNTER SYMPTOMS
ABDOMINAL PAIN: 0
CONSTIPATION: 0
CHEST TIGHTNESS: 0
WHEEZING: 0
BACK PAIN: 0
COLOR CHANGE: 0
SORE THROAT: 0
SHORTNESS OF BREATH: 0
COUGH: 0
VOMITING: 0
NAUSEA: 0

## 2024-08-20 NOTE — ASSESSMENT & PLAN NOTE
blood pressure checked twice remained borderline elevated, patient was previously on losartan 100 mg, dose was cut in half after developing dizzy spells, last visit also had borderline readings, although he does have some anxiety his home readings afterwards were fine so no changes made.  Advised patient since he was previously on a higher dose will go back and see if will be well-tolerated this time since blood pressure is trending upward.  He will take 2 tablets of the 50 mg losartan dose and continue same dose of hydrochlorothiazide, he will monitor blood pressure readings at home and will send [email or web address] message in 2 weeks with his readings, if okay and no side effects will send new order for losartan 100 mg  Reinforced recommendations to continue adherence to low-salt low-fat diet, continue abstinence from smoking, active lifestyle and update labs next visit

## 2024-08-20 NOTE — ASSESSMENT & PLAN NOTE
Stable, remains asymptomatic, continue daily statin and aspirin, continue abstinence from smoking

## 2024-08-20 NOTE — ASSESSMENT & PLAN NOTE
last lipid profile at target goal, update next visit, continue atorvastatin, continue healthy diet and regular exercise

## 2024-09-04 ENCOUNTER — PATIENT MESSAGE (OUTPATIENT)
Dept: INTERNAL MEDICINE CLINIC | Age: 69
End: 2024-09-04

## 2024-09-04 DIAGNOSIS — I10 PRIMARY HYPERTENSION: Primary | ICD-10-CM

## 2024-09-05 RX ORDER — LOSARTAN POTASSIUM 100 MG/1
100 TABLET ORAL DAILY
Qty: 90 TABLET | Refills: 1 | Status: SHIPPED | OUTPATIENT
Start: 2024-09-05

## 2024-09-05 NOTE — TELEPHONE ENCOUNTER
Blood pressure readings are great and at target goal, if tolerating losartan 100 mg dose without side effects or dizzy spells then recommend continue same.  Let us know if he needs prescription changed to 100 mg once a day or if he prefers doing the 50 mg dose twice a day

## 2024-10-21 DIAGNOSIS — K21.9 GASTROESOPHAGEAL REFLUX DISEASE WITHOUT ESOPHAGITIS: ICD-10-CM

## 2024-10-21 DIAGNOSIS — I10 PRIMARY HYPERTENSION: ICD-10-CM

## 2024-10-21 RX ORDER — HYDROCHLOROTHIAZIDE 25 MG/1
TABLET ORAL
Qty: 90 TABLET | Refills: 1 | Status: SHIPPED | OUTPATIENT
Start: 2024-10-21

## 2024-11-22 ENCOUNTER — OFFICE VISIT (OUTPATIENT)
Dept: INTERNAL MEDICINE CLINIC | Age: 69
End: 2024-11-22

## 2024-11-22 VITALS
BODY MASS INDEX: 24.1 KG/M2 | SYSTOLIC BLOOD PRESSURE: 138 MMHG | OXYGEN SATURATION: 97 % | WEIGHT: 172.8 LBS | HEART RATE: 59 BPM | DIASTOLIC BLOOD PRESSURE: 78 MMHG

## 2024-11-22 DIAGNOSIS — J40 BRONCHITIS: ICD-10-CM

## 2024-11-22 DIAGNOSIS — I10 PRIMARY HYPERTENSION: ICD-10-CM

## 2024-11-22 DIAGNOSIS — S76.211A INGUINAL STRAIN, RIGHT, INITIAL ENCOUNTER: Primary | ICD-10-CM

## 2024-11-22 PROBLEM — S76.219A GROIN STRAIN: Status: ACTIVE | Noted: 2024-11-22

## 2024-11-22 RX ORDER — METHYLPREDNISOLONE 4 MG/1
TABLET ORAL
Qty: 1 KIT | Refills: 0 | Status: SHIPPED | OUTPATIENT
Start: 2024-11-22 | End: 2024-11-28

## 2024-11-22 RX ORDER — ALBUTEROL SULFATE 90 UG/1
2 INHALANT RESPIRATORY (INHALATION) 4 TIMES DAILY PRN
Qty: 18 G | Refills: 0 | Status: SHIPPED | OUTPATIENT
Start: 2024-11-22

## 2024-11-22 ASSESSMENT — ENCOUNTER SYMPTOMS
SHORTNESS OF BREATH: 0
COUGH: 1
WHEEZING: 1
VOMITING: 0
ABDOMINAL PAIN: 0
CONSTIPATION: 0
NAUSEA: 0
BACK PAIN: 0
COLOR CHANGE: 0

## 2024-11-22 NOTE — PROGRESS NOTES
present.   Skin:     General: Skin is warm.   Neurological:      General: No focal deficit present.      Mental Status: He is alert.      Motor: No weakness.      Gait: Gait normal.   Psychiatric:         Mood and Affect: Mood normal.           Electronically signed by Shyla Medrano MD on 11/22/2024 at 2:31 PM.    This dictation was generated by voice recognition computer software.  Although all attempts are made to edit the dictation for accuracy, there may be errors in the transcription that are not intended.

## 2024-11-22 NOTE — ASSESSMENT & PLAN NOTE
blood pressure borderline however recheck is within acceptable range.  Reinforced compliance with medication regimen, continue abstinence from smoking and continue attempts to adhere to healthy low-salt diet and active lifestyle  
  no clear history of trauma, pain worsening with mostly internal rotation of right hip, pain with external rotation and anterior flexion.  Will do a trial of Medrol pack and then referral to physical therapy, once done with Medrol can use as needed NSAIDs, reevaluate as scheduled in February or sooner if needed  
  symptoms most likely triggered by viral infection, explained to patient no need for antibiotic therapy at this point however since chest auscultation revealing harsh breathing sounds will go ahead and add as needed albuterol along with supportive care measures and adequate hydration, continue over-the-counter antitussive and will do Medrol pack that should be helpful with chest congestion along as treating his musculoskeletal pain and hip/groin pain  
103.04

## 2024-12-05 ENCOUNTER — HOSPITAL ENCOUNTER (OUTPATIENT)
Dept: PHYSICAL THERAPY | Age: 69
Setting detail: THERAPIES SERIES
Discharge: HOME | End: 2024-12-05
Payer: COMMERCIAL

## 2024-12-05 DIAGNOSIS — R68.89 DECREASED FUNCTIONAL ACTIVITY TOLERANCE: Primary | ICD-10-CM

## 2024-12-05 DIAGNOSIS — M62.81 MUSCLE WEAKNESS OF LOWER EXTREMITY: ICD-10-CM

## 2024-12-05 DIAGNOSIS — Z74.09 DECREASED FUNCTIONAL MOBILITY AND ENDURANCE: ICD-10-CM

## 2024-12-05 PROCEDURE — 97530 THERAPEUTIC ACTIVITIES: CPT

## 2024-12-05 PROCEDURE — 97161 PT EVAL LOW COMPLEX 20 MIN: CPT

## 2024-12-05 NOTE — PLAN OF CARE
Physical Therapy       [facility] - [facility] Therapy [address], [place] [address] office: [phone] fax: [phone]     Physical Therapy Initial Evaluation Certification      Dear Shyla Medrano MD ,    We had the pleasure of evaluating the following patient for physical therapy services at [facility].  A summary of our findings can be found in the initial assessment below.  This includes our plan of care.  If you have any questions or concerns regarding these findings, please do not hesitate to contact me at the office phone number listed above.  Thank you for the referral.     Physician Signature:_______________________________Date:__________________  By signing above (or electronic signature), therapist’s plan is approved by physician       Physical Therapy: TREATMENT/PROGRESS NOTE   Patient: Clemente Reddy (69 y.o. male)   Examination Date: 2024   :  1955 MRN: [id number]   Visit #: 1   Insurance Allowable Auth Needed   [place] []Yes    [x]No    Insurance: Payor: MEDIGOLD / Plan: MEDIGOLD / Product Type: *No Product type* /   Insurance ID: [id number] - (Medicare Managed)  Secondary Insurance (if applicable):    Treatment Diagnosis:     ICD-10-CM    1. Decreased functional activity tolerance  R68.89       2. Decreased functional mobility and endurance  Z74.09       3. Muscle weakness of lower extremity  M62.81          Medical Diagnosis:  Inguinal strain, right, initial encounter [S76.211A]   Referring Physician: Shyla Medrano MD  PCP: Shyla Medrano MD     Plan of care signed (Y/N): sent today for cosign    Date of Patient follow up with Physician:      Plan of Care Report: EVAL today  POC update due: (10 visits /OR AUTH LIMITS, whichever is less)  1/3/2025                                             Medical History:  Comorbidities:  Hypertension  Other Cardiovascular Conditions: PVD  Relevant Medical History: Back

## 2024-12-19 ENCOUNTER — HOSPITAL ENCOUNTER (OUTPATIENT)
Dept: PHYSICAL THERAPY | Age: 69
Setting detail: THERAPIES SERIES
Discharge: HOME | End: 2024-12-19
Payer: COMMERCIAL

## 2024-12-19 PROCEDURE — 97110 THERAPEUTIC EXERCISES: CPT

## 2024-12-19 PROCEDURE — 97140 MANUAL THERAPY 1/> REGIONS: CPT

## 2024-12-19 NOTE — FLOWSHEET NOTE
Physical Therapy       [facility] - Out[facility] Rehabilitation [facility] Therapy [address], [place] [address] office: [phone] fax: [phone]         Physical Therapy: TREATMENT/PROGRESS NOTE   Patient: Clemente Reddy (69 y.o. male)   Examination Date: 2024   :  1955 MRN: [id number]   Visit #: 2   Insurance Allowable Auth Needed   MN []Yes    [x]No    Insurance: Payor: MEDIGOLD / Plan: MEDIGOLD / Product Type: *No Product type* /   Insurance ID: [id number] - (Medicare Managed)  Secondary Insurance (if applicable):    Treatment Diagnosis:     ICD-10-CM    1. Decreased functional activity tolerance  R68.89       2. Decreased functional mobility and endurance  Z74.09       3. Muscle weakness of lower extremity  M62.81          Medical Diagnosis:  Inguinal strain, right, initial encounter [S76.211A]   Referring Physician: Shyla Medrano MD  PCP: Shyla Medrano MD     Plan of care signed (Y/N): YES    Date of Patient follow up with Physician:      Plan of Care Report: NO  POC update due: (10 visits /OR AUTH LIMITS, whichever is less)  1/3/2025                                             Medical History:  Comorbidities:  Hypertension  Other Cardiovascular Conditions: PVD  Relevant Medical History: Back surgery  - laminectomy/discectomy                                         Precautions/ Contra-indications:           Latex allergy:  NO  Pacemaker:    NO  Contraindications for Manipulation: None  Date of Surgery: back surgery  - laminectomy/discectomy, stents in B groin for PVD (7-8 yrs ago)  Other:    Red Flags:  None    Suicide Screening:   The patient did not verbalize a primary behavioral concern, suicidal ideation, suicidal intent, or demonstrate suicidal behaviors.    Preferred Language for Healthcare:   [x] English       [] other:    SUBJECTIVE EXAMINATION     Patient stated complaint: :  Pt reports no pain currently; states he had discomfort when he got

## 2025-01-02 ENCOUNTER — HOSPITAL ENCOUNTER (OUTPATIENT)
Dept: PHYSICAL THERAPY | Age: 70
Setting detail: THERAPIES SERIES
Discharge: HOME | End: 2025-01-02
Payer: COMMERCIAL

## 2025-01-02 PROCEDURE — 97110 THERAPEUTIC EXERCISES: CPT

## 2025-01-02 PROCEDURE — 97140 MANUAL THERAPY 1/> REGIONS: CPT

## 2025-01-02 NOTE — FLOWSHEET NOTE
Physical Therapy       [facility] - Outpatient Rehabilitation and Therapy [address], [place] [address] office: [phone] fax: [phone]         Physical Therapy: TREATMENT/PROGRESS NOTE   Patient: Clemente Reddy (69 y.o. male)   Examination Date: 2025   :  1955 MRN: [id number]   Visit #: 3   Insurance Allowable Auth Needed   MN []Yes    [x]No    Insurance: Payor: MEDIGOLD / Plan: MEDIGOLD / Product Type: *No Product type* /   Insurance ID: [id number] - (Medicare Managed)  Secondary Insurance (if applicable):    Treatment Diagnosis:     ICD-10-CM    1. Decreased functional activity tolerance  R68.89       2. Decreased functional mobility and endurance  Z74.09       3. Muscle weakness of lower extremity  M62.81          Medical Diagnosis:  Inguinal strain, right, initial encounter [S76.211A]   Referring Physician: Shyla Medrano MD  PCP: Shyla Medrano MD     Plan of care signed (Y/N): YES    Date of Patient follow up with Physician:      Plan of Care Report: NO  POC update due: (10 visits /OR AUTH LIMITS, whichever is less)  1/3/2025 ( will complete next visit )                                            Medical History:  Comorbidities:  Hypertension  Other Cardiovascular Conditions: PVD  Relevant Medical History: Back surgery 2013 - laminectomy/discectomy                                         Precautions/ Contra-indications:           Latex allergy:  NO  Pacemaker:    NO  Contraindications for Manipulation: None  Date of Surgery: back surgery  - laminectomy/discectomy, stents in B groin for PVD (7-8 yrs ago)  Other:    Red Flags:  None    Suicide Screening:   The patient did not verbalize a primary behavioral concern, suicidal ideation, suicidal intent, or demonstrate suicidal behaviors.    Preferred Language for Healthcare:   [x] English       [] other:    SUBJECTIVE EXAMINATION     Patient stated complaint: 1/2:  Pt reports very minimal discomfort

## 2025-02-22 SDOH — HEALTH STABILITY: PHYSICAL HEALTH: ON AVERAGE, HOW MANY MINUTES DO YOU ENGAGE IN EXERCISE AT THIS LEVEL?: 10 MIN

## 2025-02-22 SDOH — HEALTH STABILITY: PHYSICAL HEALTH: ON AVERAGE, HOW MANY DAYS PER WEEK DO YOU ENGAGE IN MODERATE TO STRENUOUS EXERCISE (LIKE A BRISK WALK)?: 1 DAY

## 2025-02-22 ASSESSMENT — LIFESTYLE VARIABLES
HOW OFTEN DURING THE LAST YEAR HAVE YOU FOUND THAT YOU WERE NOT ABLE TO STOP DRINKING ONCE YOU HAD STARTED: NEVER
HOW OFTEN DURING THE LAST YEAR HAVE YOU FAILED TO DO WHAT WAS NORMALLY EXPECTED FROM YOU BECAUSE OF DRINKING: NEVER
HOW OFTEN DURING THE LAST YEAR HAVE YOU HAD A FEELING OF GUILT OR REMORSE AFTER DRINKING: NEVER
HAVE YOU OR SOMEONE ELSE BEEN INJURED AS A RESULT OF YOUR DRINKING: NO
HOW OFTEN DURING THE LAST YEAR HAVE YOU BEEN UNABLE TO REMEMBER WHAT HAPPENED THE NIGHT BEFORE BECAUSE YOU HAD BEEN DRINKING: NEVER
HOW OFTEN DURING THE LAST YEAR HAVE YOU FAILED TO DO WHAT WAS NORMALLY EXPECTED FROM YOU BECAUSE OF DRINKING: NEVER
HOW OFTEN DURING THE LAST YEAR HAVE YOU NEEDED AN ALCOHOLIC DRINK FIRST THING IN THE MORNING TO GET YOURSELF GOING AFTER A NIGHT OF HEAVY DRINKING: NEVER
HOW OFTEN DURING THE LAST YEAR HAVE YOU HAD A FEELING OF GUILT OR REMORSE AFTER DRINKING: NEVER
HOW OFTEN DURING THE LAST YEAR HAVE YOU FOUND THAT YOU WERE NOT ABLE TO STOP DRINKING ONCE YOU HAD STARTED: NEVER
HOW OFTEN DURING THE LAST YEAR HAVE YOU NEEDED AN ALCOHOLIC DRINK FIRST THING IN THE MORNING TO GET YOURSELF GOING AFTER A NIGHT OF HEAVY DRINKING: NEVER
HOW OFTEN DO YOU HAVE A DRINK CONTAINING ALCOHOL: 5
HAVE YOU OR SOMEONE ELSE BEEN INJURED AS A RESULT OF YOUR DRINKING: NO
HOW OFTEN DO YOU HAVE A DRINK CONTAINING ALCOHOL: 4 OR MORE TIMES A WEEK
HOW OFTEN DO YOU HAVE SIX OR MORE DRINKS ON ONE OCCASION: 2
HOW MANY STANDARD DRINKS CONTAINING ALCOHOL DO YOU HAVE ON A TYPICAL DAY: 3 OR 4
HAS A RELATIVE, FRIEND, DOCTOR, OR ANOTHER HEALTH PROFESSIONAL EXPRESSED CONCERN ABOUT YOUR DRINKING OR SUGGESTED YOU CUT DOWN: NO
HAS A RELATIVE, FRIEND, DOCTOR, OR ANOTHER HEALTH PROFESSIONAL EXPRESSED CONCERN ABOUT YOUR DRINKING OR SUGGESTED YOU CUT DOWN: NO
HOW MANY STANDARD DRINKS CONTAINING ALCOHOL DO YOU HAVE ON A TYPICAL DAY: 2
HOW OFTEN DURING THE LAST YEAR HAVE YOU BEEN UNABLE TO REMEMBER WHAT HAPPENED THE NIGHT BEFORE BECAUSE YOU HAD BEEN DRINKING: NEVER

## 2025-02-22 ASSESSMENT — PATIENT HEALTH QUESTIONNAIRE - PHQ9
SUM OF ALL RESPONSES TO PHQ QUESTIONS 1-9: 0
SUM OF ALL RESPONSES TO PHQ QUESTIONS 1-9: 0
1. LITTLE INTEREST OR PLEASURE IN DOING THINGS: NOT AT ALL
2. FEELING DOWN, DEPRESSED OR HOPELESS: NOT AT ALL
SUM OF ALL RESPONSES TO PHQ9 QUESTIONS 1 & 2: 0
SUM OF ALL RESPONSES TO PHQ QUESTIONS 1-9: 0
SUM OF ALL RESPONSES TO PHQ QUESTIONS 1-9: 0

## 2025-02-23 DIAGNOSIS — I73.9 PVD (PERIPHERAL VASCULAR DISEASE): ICD-10-CM

## 2025-02-23 DIAGNOSIS — E78.2 MIXED HYPERLIPIDEMIA: ICD-10-CM

## 2025-02-26 SDOH — ECONOMIC STABILITY: TRANSPORTATION INSECURITY
IN THE PAST 12 MONTHS, HAS LACK OF TRANSPORTATION KEPT YOU FROM MEETINGS, WORK, OR FROM GETTING THINGS NEEDED FOR DAILY LIVING?: NO

## 2025-02-26 SDOH — ECONOMIC STABILITY: INCOME INSECURITY: IN THE LAST 12 MONTHS, WAS THERE A TIME WHEN YOU WERE NOT ABLE TO PAY THE MORTGAGE OR RENT ON TIME?: NO

## 2025-02-26 SDOH — ECONOMIC STABILITY: FOOD INSECURITY: WITHIN THE PAST 12 MONTHS, YOU WORRIED THAT YOUR FOOD WOULD RUN OUT BEFORE YOU GOT MONEY TO BUY MORE.: NEVER TRUE

## 2025-02-26 SDOH — ECONOMIC STABILITY: FOOD INSECURITY: WITHIN THE PAST 12 MONTHS, THE FOOD YOU BOUGHT JUST DIDN'T LAST AND YOU DIDN'T HAVE MONEY TO GET MORE.: NEVER TRUE

## 2025-02-26 SDOH — ECONOMIC STABILITY: TRANSPORTATION INSECURITY
IN THE PAST 12 MONTHS, HAS THE LACK OF TRANSPORTATION KEPT YOU FROM MEDICAL APPOINTMENTS OR FROM GETTING MEDICATIONS?: NO

## 2025-02-27 ENCOUNTER — OFFICE VISIT (OUTPATIENT)
Dept: INTERNAL MEDICINE CLINIC | Age: 70
End: 2025-02-27

## 2025-02-27 VITALS
WEIGHT: 173.2 LBS | DIASTOLIC BLOOD PRESSURE: 70 MMHG | HEART RATE: 86 BPM | OXYGEN SATURATION: 100 % | SYSTOLIC BLOOD PRESSURE: 125 MMHG | BODY MASS INDEX: 24.16 KG/M2

## 2025-02-27 DIAGNOSIS — N40.0 BENIGN PROSTATIC HYPERPLASIA WITHOUT LOWER URINARY TRACT SYMPTOMS: ICD-10-CM

## 2025-02-27 DIAGNOSIS — Z00.00 MEDICARE ANNUAL WELLNESS VISIT, SUBSEQUENT: Primary | ICD-10-CM

## 2025-02-27 DIAGNOSIS — R73.01 IMPAIRED FASTING BLOOD SUGAR: ICD-10-CM

## 2025-02-27 DIAGNOSIS — Z87.891 PERSONAL HISTORY OF TOBACCO USE: ICD-10-CM

## 2025-02-27 DIAGNOSIS — I10 PRIMARY HYPERTENSION: ICD-10-CM

## 2025-02-27 DIAGNOSIS — E78.2 MIXED HYPERLIPIDEMIA: ICD-10-CM

## 2025-02-27 DIAGNOSIS — M25.551 ARTHRALGIA OF RIGHT HIP: ICD-10-CM

## 2025-02-27 PROBLEM — K38.8 MASS OF APPENDIX: Status: RESOLVED | Noted: 2022-12-09 | Resolved: 2025-02-27

## 2025-02-27 PROBLEM — S76.219A GROIN STRAIN: Status: RESOLVED | Noted: 2024-11-22 | Resolved: 2025-02-27

## 2025-02-27 PROBLEM — J40 BRONCHITIS: Status: RESOLVED | Noted: 2024-11-22 | Resolved: 2025-02-27

## 2025-02-27 RX ORDER — ATORVASTATIN CALCIUM 20 MG/1
20 TABLET, FILM COATED ORAL DAILY
Qty: 90 TABLET | Refills: 1 | Status: SHIPPED | OUTPATIENT
Start: 2025-02-27

## 2025-02-27 ASSESSMENT — ENCOUNTER SYMPTOMS
COUGH: 0
SORE THROAT: 0
COLOR CHANGE: 0
BACK PAIN: 0
CHEST TIGHTNESS: 0
CONSTIPATION: 0
NAUSEA: 0
ABDOMINAL PAIN: 0
SHORTNESS OF BREATH: 0
WHEEZING: 0
VOMITING: 0

## 2025-02-27 NOTE — ASSESSMENT & PLAN NOTE
Although symptoms improved slightly with physical therapy and stretching exercises he continues to have pain and discomfort with certain movement and walking, especially lateral and movement.  Exam showing fair range of motion however with tenderness of the right hip lateral rotation and internal rotation.  Will check CT scan of the hip to assess for arthritis and will make recommendations subsequently for referral to orthopedic surgery.  Can continue as needed ibuprofen since getting some relief with that, continue with the stretches that were recommended by physical therapy

## 2025-02-27 NOTE — ASSESSMENT & PLAN NOTE
Blood pressure stable and well-controlled on current medication regimen, update labs, continue meds along with healthy low-salt diet and active lifestyle

## 2025-02-27 NOTE — PROGRESS NOTES
Medicare Annual Wellness Visit  Name: Clemente Reddy Today’s Date: 2025   MRN: [id number] Sex: Male   Age: 69 y.o. Ethnicity: Non- / Non    : 1955 Race: White (non-)      Clemente Reddy is here for Medicare AWV (Still having issues with  his leg--did do PT )     Screenings for behavioral, psychosocial and functional/safety risks, and cognitive dysfunction are all negative except as indicated below. These results, as well as other patient data from the Health Risk Assessment form, are documented in Flowsheets linked to this Encounter.    Allergies   Allergen Reactions    Chantix [Varenicline]      Pananoia       Prior to Visit Medications    Medication Sig Taking? Authorizing Provider   hydroCHLOROthiazide (HYDRODIURIL) 25 MG tablet TAKE 1 TABLET DAILY Yes Shyla Medrano MD   omeprazole (PRILOSEC) 20 MG delayed release capsule TAKE 1 CAPSULE EVERY       MORNING BEFORE BREAKFAST Yes Shyla Medrano MD   losartan (COZAAR) 100 MG tablet Take 1 tablet by mouth daily Yes Shyla Medrano MD   atorvastatin (LIPITOR) 20 MG tablet Take 1 tablet by mouth daily Yes Shyla Medrano MD   aspirin 81 MG tablet Take 1 tablet by mouth daily Yes Provider, MD Jose Miguel       Past Medical History:   Diagnosis Date    Colon polyp     GERD (gastroesophageal reflux disease)     Hyperlipidemia     Hypertension     PVD (peripheral vascular disease)      Past Surgical History:   Procedure Laterality Date    BACK SURGERY  2013    Dr. Godinez    COLONOSCOPY      COLONOSCOPY N/A 2022    COLONOSCOPY DIAGNOSTIC performed by Godwin Zepeda MD at [facility] ENDOSCOPY    CYST REMOVAL  posterior right ear     LAPAROSCOPIC APPENDECTOMY N/A 2022    LAPAROSCOPIC APPENDECTOMY performed by Bennie Mckeon MD at [facility] OR    OTHER SURGICAL HISTORY Bilateral 2013    Dr. Duarte; Iliac stents    TONSILLECTOMY         Family History   Problem Relation Age of Onset    Breast Cancer

## 2025-02-28 DIAGNOSIS — R73.01 IMPAIRED FASTING BLOOD SUGAR: ICD-10-CM

## 2025-02-28 DIAGNOSIS — I10 PRIMARY HYPERTENSION: ICD-10-CM

## 2025-02-28 DIAGNOSIS — E78.2 MIXED HYPERLIPIDEMIA: ICD-10-CM

## 2025-02-28 DIAGNOSIS — N40.0 BENIGN PROSTATIC HYPERPLASIA WITHOUT LOWER URINARY TRACT SYMPTOMS: ICD-10-CM

## 2025-02-28 LAB
ALBUMIN SERPL-MCNC: 4.1 G/DL (ref 3.4–5)
ALBUMIN/GLOB SERPL: 1.9 {RATIO} (ref 1.1–2.2)
ALP SERPL-CCNC: 58 U/L (ref 40–129)
ALT SERPL-CCNC: 15 U/L (ref 10–40)
ANION GAP SERPL CALCULATED.3IONS-SCNC: 11 MMOL/L (ref 3–16)
AST SERPL-CCNC: 23 U/L (ref 15–37)
BILIRUB SERPL-MCNC: 0.4 MG/DL (ref 0–1)
BUN SERPL-MCNC: 15 MG/DL (ref 7–20)
CALCIUM SERPL-MCNC: 9 MG/DL (ref 8.3–10.6)
CHLORIDE SERPL-SCNC: 103 MMOL/L (ref 99–110)
CHOLEST SERPL-MCNC: 148 MG/DL (ref 0–199)
CO2 SERPL-SCNC: 28 MMOL/L (ref 21–32)
CREAT SERPL-MCNC: 1.1 MG/DL (ref 0.8–1.3)
DEPRECATED RDW RBC AUTO: 14.4 % (ref 12.4–15.4)
GFR SERPLBLD CREATININE-BSD FMLA CKD-EPI: 72 ML/MIN/{1.73_M2}
GLUCOSE SERPL-MCNC: 110 MG/DL (ref 70–99)
HCT VFR BLD AUTO: 37 % (ref 40.5–52.5)
HDLC SERPL-MCNC: 72 MG/DL (ref 40–60)
HGB BLD-MCNC: 12.4 G/DL (ref 13.5–17.5)
LDLC SERPL CALC-MCNC: 60 MG/DL
MCH RBC QN AUTO: 34.1 PG (ref 26–34)
MCHC RBC AUTO-ENTMCNC: 33.5 G/DL (ref 31–36)
MCV RBC AUTO: 101.7 FL (ref 80–100)
PLATELET # BLD AUTO: 238 K/UL (ref 135–450)
PMV BLD AUTO: 9.8 FL (ref 5–10.5)
POTASSIUM SERPL-SCNC: 4 MMOL/L (ref 3.5–5.1)
PROT SERPL-MCNC: 6.3 G/DL (ref 6.4–8.2)
PSA SERPL DL<=0.01 NG/ML-MCNC: 2.45 NG/ML (ref 0–4)
RBC # BLD AUTO: 3.63 M/UL (ref 4.2–5.9)
SODIUM SERPL-SCNC: 142 MMOL/L (ref 136–145)
TRIGL SERPL-MCNC: 79 MG/DL (ref 0–150)
TSH SERPL DL<=0.005 MIU/L-ACNC: 1.31 UIU/ML (ref 0.27–4.2)
VLDLC SERPL CALC-MCNC: 16 MG/DL
WBC # BLD AUTO: 5.1 K/UL (ref 4–11)

## 2025-03-03 DIAGNOSIS — D64.9 CHRONIC ANEMIA: Primary | ICD-10-CM

## 2025-03-03 LAB
REASON FOR REJECTION: NORMAL
REJECTED TEST: NORMAL

## 2025-03-04 ENCOUNTER — TELEPHONE (OUTPATIENT)
Dept: INTERNAL MEDICINE CLINIC | Age: 70
End: 2025-03-04

## 2025-03-07 ENCOUNTER — PATIENT MESSAGE (OUTPATIENT)
Dept: INTERNAL MEDICINE CLINIC | Age: 70
End: 2025-03-07

## 2025-03-07 ENCOUNTER — HOSPITAL ENCOUNTER (OUTPATIENT)
Dept: CT IMAGING | Age: 70
Discharge: HOME | End: 2025-03-07
Payer: COMMERCIAL

## 2025-03-07 DIAGNOSIS — M25.551 ARTHRALGIA OF RIGHT HIP: Primary | ICD-10-CM

## 2025-03-07 DIAGNOSIS — M25.551 ARTHRALGIA OF RIGHT HIP: ICD-10-CM

## 2025-03-07 DIAGNOSIS — Z87.891 PERSONAL HISTORY OF TOBACCO USE: ICD-10-CM

## 2025-03-07 PROCEDURE — 71271 CT THORAX LUNG CANCER SCR C-: CPT

## 2025-03-07 PROCEDURE — 73700 CT LOWER EXTREMITY W/O DYE: CPT

## 2025-03-15 DIAGNOSIS — I10 PRIMARY HYPERTENSION: ICD-10-CM

## 2025-03-17 RX ORDER — LOSARTAN POTASSIUM 100 MG/1
100 TABLET ORAL DAILY
Qty: 90 TABLET | Refills: 1 | Status: SHIPPED | OUTPATIENT
Start: 2025-03-17 | End: 2025-03-21 | Stop reason: SDUPTHER

## 2025-03-21 DIAGNOSIS — I10 PRIMARY HYPERTENSION: ICD-10-CM

## 2025-03-21 RX ORDER — LOSARTAN POTASSIUM 100 MG/1
100 TABLET ORAL DAILY
Qty: 90 TABLET | Refills: 1 | Status: SHIPPED | OUTPATIENT
Start: 2025-03-21

## 2025-03-21 NOTE — TELEPHONE ENCOUNTER
Pt requesting losartan 100mg switched to [employer] [facility] [employer] instead of [facility]. States it's cheaper from [employer]. Please advise

## 2025-04-12 DIAGNOSIS — I10 PRIMARY HYPERTENSION: ICD-10-CM

## 2025-04-12 DIAGNOSIS — K21.9 GASTROESOPHAGEAL REFLUX DISEASE WITHOUT ESOPHAGITIS: ICD-10-CM

## 2025-04-14 RX ORDER — HYDROCHLOROTHIAZIDE 25 MG/1
25 TABLET ORAL DAILY
Qty: 90 TABLET | Refills: 1 | Status: SHIPPED | OUTPATIENT
Start: 2025-04-14

## 2025-04-14 RX ORDER — OMEPRAZOLE 20 MG/1
20 CAPSULE, DELAYED RELEASE ORAL
Qty: 90 CAPSULE | Refills: 1 | Status: SHIPPED | OUTPATIENT
Start: 2025-04-14

## 2025-04-28 ENCOUNTER — OFFICE VISIT (OUTPATIENT)
Dept: ORTHOPEDIC SURGERY | Age: 70
End: 2025-04-28
Payer: COMMERCIAL

## 2025-04-28 VITALS — WEIGHT: 173 LBS | BODY MASS INDEX: 24.22 KG/M2 | HEIGHT: 71 IN

## 2025-04-28 DIAGNOSIS — M25.551 PAIN OF RIGHT HIP: Primary | ICD-10-CM

## 2025-04-28 PROCEDURE — 1123F ACP DISCUSS/DSCN MKR DOCD: CPT | Performed by: ORTHOPAEDIC SURGERY

## 2025-04-28 PROCEDURE — 1159F MED LIST DOCD IN RCRD: CPT | Performed by: ORTHOPAEDIC SURGERY

## 2025-04-28 PROCEDURE — 1125F AMNT PAIN NOTED PAIN PRSNT: CPT | Performed by: ORTHOPAEDIC SURGERY

## 2025-04-28 PROCEDURE — 99204 OFFICE O/P NEW MOD 45 MIN: CPT | Performed by: ORTHOPAEDIC SURGERY

## 2025-04-28 NOTE — PROGRESS NOTES
Resisted Adduction 5/5   Resisted  Flexion 5/5  Athletic Pubalgia: deferred   moderate tender at greater trochanter  Leg Length: equal    Prone: Present hip flexion contracture  Non tender to the proximal hamstring   Non tender to the lumbar spine or sacro-iliac joints    Distal Neurovascular exam is intact (foot sensation, pulses, and motor exam)          Diagnostics:  Radiology:     Results              Pertinent imaging reviewed, both images and report.     newRadiographs were obtained and reviewed in the office; 3 views: AP Pelvis and right hip 45-deg Bustamante View, and right False Profile View, along with 2 views of the lumbar spine (AP and lateral standing): There is severe right hip osteoarthritis, no evidence of fracture or dislocation.  There is also evidence of left hip osteoarthritis        CT Right Hip 3/7/25:  FINDINGS:  Bones/joints: Advanced right hip osteoarthritis with superior joint space  narrowing and bone on bone contact.  Associated spurring along the acetabulum  and head neck junction.  There is a small effusion present.  On the large  field-of-view images there is also moderate left hip osteoarthritis with  joint space narrowing and spurring along the acetabular margin and head neck  junction.  On the left there is mild enthesopathy at the trochanteric  insertion.  Incidental note is made of moderate degeneration throughout the  lower lumbar spine vascular calcifications and aorto iliac grafts.     Soft Tissue: No obvious bursitis or dystrophic calcification or soft tissue  mass.     IMPRESSION:  Advanced right hip osteoarthritis.    XR Lumbar Spine AP and Lateral:  There is diffuse degenerative disc disease, no evidence of fracture or dislocation, there is presence of endovascular stent    Plan       Impression:  Visit Diagnoses         Codes      Pain of right hip    -  Primary M25.551             Office Procedures:  No orders of the defined types were placed in this encounter.    Orders

## 2025-04-29 DIAGNOSIS — M25.59 PAIN IN OTHER SPECIFIED JOINT: ICD-10-CM

## 2025-04-29 DIAGNOSIS — E55.9 VITAMIN D DEFICIENCY: ICD-10-CM

## 2025-04-29 DIAGNOSIS — M16.11 PRIMARY OSTEOARTHRITIS OF RIGHT HIP: Primary | ICD-10-CM

## 2025-04-29 DIAGNOSIS — Z13.1 SCREENING FOR DIABETES MELLITUS: ICD-10-CM

## 2025-04-29 DIAGNOSIS — Z01.818 PREOPERATIVE CLEARANCE: ICD-10-CM

## 2025-04-29 DIAGNOSIS — R73.01 IMPAIRED FASTING BLOOD SUGAR: ICD-10-CM

## 2025-04-29 DIAGNOSIS — M79.604 PAIN OF RIGHT LOWER EXTREMITY: ICD-10-CM

## 2025-04-29 DIAGNOSIS — Z13.21 ENCOUNTER FOR SPECIAL SCREENING EXAMINATION FOR NUTRITIONAL DISORDER: ICD-10-CM

## 2025-04-29 DIAGNOSIS — Z01.812 PRE-OPERATIVE LABORATORY EXAMINATION: ICD-10-CM

## 2025-05-02 ENCOUNTER — PREP FOR PROCEDURE (OUTPATIENT)
Dept: ORTHOPEDIC SURGERY | Age: 70
End: 2025-05-02

## 2025-05-02 DIAGNOSIS — M16.11 PRIMARY OSTEOARTHRITIS OF RIGHT HIP: ICD-10-CM

## 2025-05-12 ENCOUNTER — OFFICE VISIT (OUTPATIENT)
Dept: INTERNAL MEDICINE CLINIC | Age: 70
End: 2025-05-12
Payer: COMMERCIAL

## 2025-05-12 DIAGNOSIS — I10 PRIMARY HYPERTENSION: ICD-10-CM

## 2025-05-12 DIAGNOSIS — Z01.818 PREOP EXAM FOR INTERNAL MEDICINE: Primary | ICD-10-CM

## 2025-05-12 DIAGNOSIS — M16.11 PRIMARY OSTEOARTHRITIS OF RIGHT HIP: ICD-10-CM

## 2025-05-12 PROCEDURE — 1160F RVW MEDS BY RX/DR IN RCRD: CPT | Performed by: NURSE PRACTITIONER

## 2025-05-12 PROCEDURE — 3079F DIAST BP 80-89 MM HG: CPT | Performed by: NURSE PRACTITIONER

## 2025-05-12 PROCEDURE — 1123F ACP DISCUSS/DSCN MKR DOCD: CPT | Performed by: NURSE PRACTITIONER

## 2025-05-12 PROCEDURE — 3077F SYST BP >= 140 MM HG: CPT | Performed by: NURSE PRACTITIONER

## 2025-05-12 PROCEDURE — 99214 OFFICE O/P EST MOD 30 MIN: CPT | Performed by: NURSE PRACTITIONER

## 2025-05-12 PROCEDURE — 1159F MED LIST DOCD IN RCRD: CPT | Performed by: NURSE PRACTITIONER

## 2025-05-12 ASSESSMENT — ENCOUNTER SYMPTOMS
NAUSEA: 0
ABDOMINAL PAIN: 0
VOMITING: 0
WHEEZING: 0
COUGH: 0
SHORTNESS OF BREATH: 0

## 2025-05-12 NOTE — PROGRESS NOTES
Preoperative Consultation      Name:  Clemente Reddy  YOB: 1955  Date of Service:  5/12/2025    Chief Complaint   Patient presents with    Pre-op Exam     Pt is having right total hip replacement 6/06 with Dr. Kervin Cleveland at [facility]        Clemente Reddy is a 69 y.o. male is referred by Dr. Kervin Cleveland for perioperative risk determination for upcoming surgery for Right Total Hip Replacement on 6/6/25 at [facility].      General Anesthesia  Previous anesthesia complications: No  Loose, capped or false teeth: No  Known Bleeding Risk: No recent or remote history of abnormal bleeding  Personal or FH of DVT/PE: No    Patient objection to receiving blood products: No  Indicated/required preoperative testing: EKG/labs per surgeon  Smoker: former  Recent chest pain or SOB: No  Any ASA, blood thinners, fish oil, NSAIDs or herbals: Takes ASA and multivitamin 1 week prior to surgery.      Allergies   Allergen Reactions    Chantix [Varenicline]      Pananoia     Current Outpatient Medications   Medication Sig Dispense Refill    omeprazole (PRILOSEC) 20 MG delayed release capsule TAKE 1 CAPSULE EVERY       MORNING BEFORE BREAKFAST 90 capsule 1    hydroCHLOROthiazide (HYDRODIURIL) 25 MG tablet TAKE 1 TABLET DAILY 90 tablet 1    losartan (COZAAR) 100 MG tablet Take 1 tablet by mouth daily 90 tablet 1    atorvastatin (LIPITOR) 20 MG tablet TAKE 1 TABLET DAILY 90 tablet 1    aspirin 81 MG tablet Take 1 tablet by mouth daily       No current facility-administered medications for this visit.     Patient Active Problem List   Diagnosis    Personal history of tobacco use    Radiculopathy, lumbar region    Hypertension    PVD (peripheral vascular disease)    Colon polyp    Mixed hyperlipidemia    Degeneration of cervical intervertebral disc    Degeneration of lumbar or lumbosacral intervertebral disc    Low back pain    Vitamin D deficiency    Impaired fasting

## 2025-05-18 VITALS
HEART RATE: 93 BPM | HEIGHT: 71 IN | BODY MASS INDEX: 23.6 KG/M2 | DIASTOLIC BLOOD PRESSURE: 80 MMHG | OXYGEN SATURATION: 98 % | SYSTOLIC BLOOD PRESSURE: 162 MMHG | WEIGHT: 168.6 LBS

## 2025-05-20 ENCOUNTER — TELEPHONE (OUTPATIENT)
Dept: ORTHOPEDIC SURGERY | Age: 70
End: 2025-05-20

## 2025-05-20 ENCOUNTER — OFFICE VISIT (OUTPATIENT)
Dept: INTERNAL MEDICINE CLINIC | Age: 70
End: 2025-05-20
Payer: COMMERCIAL

## 2025-05-20 VITALS
HEART RATE: 92 BPM | DIASTOLIC BLOOD PRESSURE: 75 MMHG | WEIGHT: 169 LBS | SYSTOLIC BLOOD PRESSURE: 148 MMHG | BODY MASS INDEX: 23.66 KG/M2 | OXYGEN SATURATION: 95 % | HEIGHT: 71 IN

## 2025-05-20 DIAGNOSIS — I73.9 PVD (PERIPHERAL VASCULAR DISEASE): ICD-10-CM

## 2025-05-20 DIAGNOSIS — R94.31 ABNORMAL ECG: ICD-10-CM

## 2025-05-20 DIAGNOSIS — I10 PRIMARY HYPERTENSION: ICD-10-CM

## 2025-05-20 DIAGNOSIS — Z01.818 PREOP EXAM FOR INTERNAL MEDICINE: Primary | ICD-10-CM

## 2025-05-20 PROBLEM — Z87.891 FORMER SMOKER: Status: ACTIVE | Noted: 2025-05-20

## 2025-05-20 PROCEDURE — 93000 ELECTROCARDIOGRAM COMPLETE: CPT | Performed by: INTERNAL MEDICINE

## 2025-05-20 PROCEDURE — 99214 OFFICE O/P EST MOD 30 MIN: CPT | Performed by: INTERNAL MEDICINE

## 2025-05-20 RX ORDER — METOPROLOL SUCCINATE 25 MG/1
25 TABLET, EXTENDED RELEASE ORAL DAILY
Qty: 90 TABLET | Refills: 0 | Status: SHIPPED | OUTPATIENT
Start: 2025-05-20

## 2025-05-20 ASSESSMENT — ENCOUNTER SYMPTOMS
NAUSEA: 0
CHEST TIGHTNESS: 0
CONSTIPATION: 0
COUGH: 0
SHORTNESS OF BREATH: 0
ABDOMINAL PAIN: 0
VOMITING: 0
COLOR CHANGE: 0
SORE THROAT: 0
WHEEZING: 0
EYE DISCHARGE: 0
SINUS PAIN: 0
BACK PAIN: 0

## 2025-05-20 NOTE — ASSESSMENT & PLAN NOTE
Remains stable and asymptomatic, he will continue statin therapy and aspirin but will hold the aspirin 7 to 10 days preop

## 2025-05-20 NOTE — ASSESSMENT & PLAN NOTE
History and physical exam reviewed, patient with no known significant cardiac history other than hypertension that is generally well-controlled although lately readings been borderline elevated.  Patient also has history of peripheral vascular disease s/p femoral stents and well-controlled symptoms.  Home blood pressure readings lower than in office readings, today blood pressure checked several times using his own machine and in office machine, it appears that home machine giving falsely low readings, advised patient to obtain new blood pressure monitor, he is somewhat anxious about upcoming surgery which is probably contributing to mild elevation in blood pressure, advised will add low-dose long-acting metoprolol to his current dose of losartan and hydrochlorothiazide.  He will complete lab work this visit, will return in 1 week for blood pressure check before final preop clearance  He is aware to hold aspirin and avoid NSAIDs 1 week prior to the surgery, there is no past history of anesthesia complication and no family history of malignant hyperthermia  Addendum :EKG completed this visit, findings consistent with right bundle branch block and history of COPD, no prior EKGs available in epic to compare if this is new finding or not, will notify patient needs to have cardiology evaluation and cardiac clearance since surgery is elective.

## 2025-05-20 NOTE — TELEPHONE ENCOUNTER
----- Message from Jeannie LUND sent at 5/20/2025  1:51 PM EDT -----  Specialty Message to Provider    Relationship to Patient: Self     Patient Message: CARDIOLOGIST APPOINTMENT  --------------------------------------------------------------------------------------------------------------------------    Call Back Information: OK to leave message on voicemail  Preferred Call Back Number: Phone  [phone]    PATIENT CALLING REGARDING HE HAS TOTAL RT HIP REPLACEMENT SCHEDULED FOR JUNE 6, 2025.    PATIENT HAD HIS PRE-OP PHYSICAL AND HE NEEDS TO SEE A CARDIOLOGIST. THE 1ST AVAILABLE APPOINTMENT IS JUNE 11, 2025.    PLEASE CALL BACK PATIENT AT THE ABOVE NUMBER TO DISCUSS THE CARDIOLOGIST APPOINTMENT AND WHAT THE NEXT STEP SHOULD BE.

## 2025-05-20 NOTE — ASSESSMENT & PLAN NOTE
As noted above, right bundle branch block noted on EKG completed after visit, no previous EKGs to compare if this is new or chronic, patient is at increased risk for underlying cardiac disease given history of longstanding hypertension, past history of smoking with mild emphysema on lung scan and peripheral vascular disease.  Will refer to cardiology for further evaluation and cardiac clearance prior to surgery

## 2025-05-20 NOTE — TELEPHONE ENCOUNTER
S/w patient; he will call back to let me know if he can get sooner appt. If not will postpone sx to end of june

## 2025-05-20 NOTE — PROGRESS NOTES
ASSESSMENT/PLAN:  1. Preop exam for internal medicine  Assessment & Plan:  History and physical exam reviewed, patient with no known significant cardiac history other than hypertension that is generally well-controlled although lately readings been borderline elevated.  Patient also has history of peripheral vascular disease s/p femoral stents and well-controlled symptoms.  Home blood pressure readings lower than in office readings, today blood pressure checked several times using his own machine and in office machine, it appears that home machine giving falsely low readings, advised patient to obtain new blood pressure monitor, he is somewhat anxious about upcoming surgery which is probably contributing to mild elevation in blood pressure, advised will add low-dose long-acting metoprolol to his current dose of losartan and hydrochlorothiazide.  He will complete lab work this visit, will return in 1 week for blood pressure check before final preop clearance  He is aware to hold aspirin and avoid NSAIDs 1 week prior to the surgery, there is no past history of anesthesia complication and no family history of malignant hyperthermia  Addendum :EKG completed this visit, findings consistent with right bundle branch block and history of COPD, no prior EKGs available in epic to compare if this is new finding or not, will notify patient needs to have cardiology evaluation and cardiac clearance since surgery is elective.  Orders:  -     EKG 12 Lead  2. Primary hypertension  -     metoprolol succinate (TOPROL XL) 25 MG extended release tablet; Take 1 tablet by mouth daily, Disp-90 tablet, R-0Normal  3. PVD (peripheral vascular disease)  Assessment & Plan:  Remains stable and asymptomatic, he will continue statin therapy and aspirin but will hold the aspirin 7 to 10 days preop   4. Abnormal ECG  Assessment & Plan:  As noted above, right bundle branch block noted on EKG completed after visit, no previous EKGs to compare if this

## 2025-05-21 ENCOUNTER — TELEPHONE (OUTPATIENT)
Dept: ORTHOPEDIC SURGERY | Age: 70
End: 2025-05-21

## 2025-05-21 NOTE — PATIENT INSTRUCTIONS
Thank you for coming to see me today   Continue to be active   No changes to your medications   Follow up with pcp for blood pressure management

## 2025-05-21 NOTE — TELEPHONE ENCOUNTER
General Question     Subject: TESTS AND GENERAL QUESTIONS BEFORE SURGERY   Patient and /or Facility Request: Clemente Reddy   Contact Number: [phone]

## 2025-05-21 NOTE — PROGRESS NOTES
[facility] [place]  [phone]      Patient: Clemente Reddy  YOB: 1955         Chief Complaint   Patient presents with    New Patient    Hypertension    Hyperlipidemia    Cardiac Clearance     Hip surgery with Dr. Cleveland 6/6/2025- Right hip [facility]         Referring provider: Shyla Medrano MD    History of Present Illness:   Clemente Reddy is a 69 y.o. male presenting as a new patient to me.     Today he is here alone. Needs cardiac risk assessment for right hip replacement. He denies personal cardiovascular history. Denies family cardiovascular history, notes most family members passed from cancer. He enjoys walking but has had to limit this due to pain in his hip. Able to complete a set of stairs without chest pain/pressure, SOB/TEJEDA, does have pain. He smoked for ~45 years, has been in cessation for 5-8 years. Last procedure, 2 years ago, laparoscopic appendectomy, tolerated well.     With regard to medication therapy he/she has been compliant with prescribed regimen and has tolerated therapy to date.    Past Medical History:   has a past medical history of Colon polyp, GERD (gastroesophageal reflux disease), Hyperlipidemia, Hypertension, and PVD (peripheral vascular disease).    Surgical History:   has a past surgical history that includes cyst removal (posterior right ear 1980); Tonsillectomy; back surgery (01/01/2013); other surgical history (Bilateral, 01/01/2013); Colonoscopy; Colonoscopy (N/A, 11/7/2022); and laparoscopic appendectomy (N/A, 12/9/2022).     Current Outpatient Medications   Medication Sig Dispense Refill    metoprolol succinate (TOPROL XL) 25 MG extended release tablet Take 1 tablet by mouth daily 90 tablet 0    omeprazole (PRILOSEC) 20 MG delayed release capsule TAKE 1 CAPSULE EVERY       MORNING BEFORE BREAKFAST 90 capsule 1    hydroCHLOROthiazide (HYDRODIURIL) 25 MG tablet TAKE 1 TABLET DAILY 90 tablet 1    losartan (COZAAR) 100 MG tablet Take 1

## 2025-05-21 NOTE — TELEPHONE ENCOUNTER
S/W PATIENT. HE HAS APPT WITH CARDIOLOGIST FOR CLEARANCE ON 5/27/25 AND WILL GET LABS DRAWN TOMORROW

## 2025-05-22 ENCOUNTER — CLINICAL SUPPORT (OUTPATIENT)
Dept: INTERNAL MEDICINE CLINIC | Age: 70
End: 2025-05-22

## 2025-05-22 VITALS — DIASTOLIC BLOOD PRESSURE: 76 MMHG | SYSTOLIC BLOOD PRESSURE: 154 MMHG

## 2025-05-22 DIAGNOSIS — I10 PRIMARY HYPERTENSION: Primary | ICD-10-CM

## 2025-05-22 LAB
INR PPP: 0.86 (ref 0.85–1.15)
PROTHROMBIN TIME: 12 SEC (ref 11.9–14.9)

## 2025-05-23 LAB
25(OH)D3 SERPL-MCNC: 39.7 NG/ML
ALBUMIN SERPL-MCNC: 4.3 G/DL (ref 3.4–5)
ALBUMIN/GLOB SERPL: 1.8 {RATIO} (ref 1.1–2.2)
ALP SERPL-CCNC: 59 U/L (ref 40–129)
ALT SERPL-CCNC: 14 U/L (ref 10–40)
ANION GAP SERPL CALCULATED.3IONS-SCNC: 10 MMOL/L (ref 3–16)
AST SERPL-CCNC: 23 U/L (ref 15–37)
BACTERIA URNS QL MICRO: NORMAL /HPF
BASOPHILS # BLD: 0.1 K/UL (ref 0–0.2)
BASOPHILS NFR BLD: 1.3 %
BILIRUB SERPL-MCNC: 0.6 MG/DL (ref 0–1)
BILIRUB UR QL STRIP.AUTO: NEGATIVE
BUN SERPL-MCNC: 21 MG/DL (ref 7–20)
CALCIUM SERPL-MCNC: 9.1 MG/DL (ref 8.3–10.6)
CHLORIDE SERPL-SCNC: 102 MMOL/L (ref 99–110)
CLARITY UR: CLEAR
CO2 SERPL-SCNC: 27 MMOL/L (ref 21–32)
COLOR UR: YELLOW
CREAT SERPL-MCNC: 1 MG/DL (ref 0.8–1.3)
DEPRECATED RDW RBC AUTO: 13.5 % (ref 12.4–15.4)
EOSINOPHIL # BLD: 0.3 K/UL (ref 0–0.6)
EOSINOPHIL NFR BLD: 5.5 %
EPI CELLS #/AREA URNS AUTO: 2 /HPF (ref 0–5)
EST. AVERAGE GLUCOSE BLD GHB EST-MCNC: 96.8 MG/DL
GFR SERPLBLD CREATININE-BSD FMLA CKD-EPI: 81 ML/MIN/{1.73_M2}
GLUCOSE SERPL-MCNC: 124 MG/DL (ref 70–99)
GLUCOSE UR STRIP.AUTO-MCNC: NEGATIVE MG/DL
HBA1C MFR BLD: 5 %
HCT VFR BLD AUTO: 38.7 % (ref 40.5–52.5)
HGB BLD-MCNC: 12.9 G/DL (ref 13.5–17.5)
HGB UR QL STRIP.AUTO: NEGATIVE
HYALINE CASTS #/AREA URNS AUTO: 4 /LPF (ref 0–8)
KETONES UR STRIP.AUTO-MCNC: ABNORMAL MG/DL
LEUKOCYTE ESTERASE UR QL STRIP.AUTO: NEGATIVE
LYMPHOCYTES # BLD: 1.1 K/UL (ref 1–5.1)
LYMPHOCYTES NFR BLD: 23.4 %
MCH RBC QN AUTO: 34.3 PG (ref 26–34)
MCHC RBC AUTO-ENTMCNC: 33.2 G/DL (ref 31–36)
MCV RBC AUTO: 103.2 FL (ref 80–100)
MONOCYTES # BLD: 0.3 K/UL (ref 0–1.3)
MONOCYTES NFR BLD: 6.5 %
MRSA DNA SPEC QL NAA+PROBE: NORMAL
NEUTROPHILS # BLD: 2.9 K/UL (ref 1.7–7.7)
NEUTROPHILS NFR BLD: 63.3 %
NITRITE UR QL STRIP.AUTO: NEGATIVE
PH UR STRIP.AUTO: 6 [PH] (ref 5–8)
PLATELET # BLD AUTO: 253 K/UL (ref 135–450)
PMV BLD AUTO: 11.3 FL (ref 5–10.5)
POTASSIUM SERPL-SCNC: 4.3 MMOL/L (ref 3.5–5.1)
PROT SERPL-MCNC: 6.7 G/DL (ref 6.4–8.2)
PROT UR STRIP.AUTO-MCNC: ABNORMAL MG/DL
RBC # BLD AUTO: 3.75 M/UL (ref 4.2–5.9)
RBC CLUMPS #/AREA URNS AUTO: 1 /HPF (ref 0–4)
SODIUM SERPL-SCNC: 139 MMOL/L (ref 136–145)
SP GR UR STRIP.AUTO: 1.01 (ref 1–1.03)
UA COMPLETE W REFLEX CULTURE PNL UR: ABNORMAL
UA DIPSTICK W REFLEX MICRO PNL UR: YES
URN SPEC COLLECT METH UR: ABNORMAL
UROBILINOGEN UR STRIP-ACNC: 1 E.U./DL
WBC # BLD AUTO: 4.6 K/UL (ref 4–11)
WBC #/AREA URNS AUTO: 0 /HPF (ref 0–5)

## 2025-05-27 ENCOUNTER — OFFICE VISIT (OUTPATIENT)
Dept: CARDIOLOGY CLINIC | Age: 70
End: 2025-05-27
Payer: COMMERCIAL

## 2025-05-27 ENCOUNTER — TELEPHONE (OUTPATIENT)
Dept: ORTHOPEDIC SURGERY | Age: 70
End: 2025-05-27

## 2025-05-27 VITALS
OXYGEN SATURATION: 99 % | HEART RATE: 65 BPM | SYSTOLIC BLOOD PRESSURE: 142 MMHG | DIASTOLIC BLOOD PRESSURE: 86 MMHG | WEIGHT: 168.8 LBS | BODY MASS INDEX: 23.63 KG/M2 | HEIGHT: 71 IN

## 2025-05-27 DIAGNOSIS — Z01.810 ENCOUNTER FOR PRE-OPERATIVE CARDIOVASCULAR CLEARANCE: ICD-10-CM

## 2025-05-27 DIAGNOSIS — I10 HYPERTENSION, UNSPECIFIED TYPE: ICD-10-CM

## 2025-05-27 DIAGNOSIS — I45.10 RBBB: Primary | ICD-10-CM

## 2025-05-27 DIAGNOSIS — E78.2 MIXED HYPERLIPIDEMIA: ICD-10-CM

## 2025-05-27 DIAGNOSIS — I73.9 PVD (PERIPHERAL VASCULAR DISEASE): ICD-10-CM

## 2025-05-27 PROCEDURE — 1123F ACP DISCUSS/DSCN MKR DOCD: CPT | Performed by: INTERNAL MEDICINE

## 2025-05-27 PROCEDURE — 93000 ELECTROCARDIOGRAM COMPLETE: CPT | Performed by: INTERNAL MEDICINE

## 2025-05-27 PROCEDURE — 99204 OFFICE O/P NEW MOD 45 MIN: CPT | Performed by: INTERNAL MEDICINE

## 2025-05-27 PROCEDURE — 3077F SYST BP >= 140 MM HG: CPT | Performed by: INTERNAL MEDICINE

## 2025-05-27 PROCEDURE — 3079F DIAST BP 80-89 MM HG: CPT | Performed by: INTERNAL MEDICINE

## 2025-05-27 PROCEDURE — 1159F MED LIST DOCD IN RCRD: CPT | Performed by: INTERNAL MEDICINE

## 2025-05-27 NOTE — TELEPHONE ENCOUNTER
Orthopedic Nurse Navigator Summary    Patient Name:  Clemente Reddy  Anticipated Date of Surgery:  06/06/25  Attended Pre-op Education Class:  Video sent to patient email 05/21/25  PCP: Shyla Medrano MD  Date of PCP visit for H&P: 05/12/25  Is patient in a Pain Management program:  Review of Medical history reveals history of: HTN, Hyperlipidemia, RBBB, IFG, PVD, BPH, Cervical disc degenerations and radiculopathy, Lumbar disc degeneration and radiculopathy, GERD, Vitamin D deficiency, Colon polyps    Critical Lab Values  - Hemoglobin (g/dL):  Date: 05/22/25 Value 12.9  - Hematocrit(%): Date: 05/22/25  Value 38.7  - HgbA1C:  Date: 05/22/25 Value 5.0  - Albumin:  Date: 05/22/25  Value 4.3  - BUN:  Date: 05/22/25   Value 21  - Creatinine:  Date:  05/22/25 Value 1.0    05/22/25 MRSA swab- negative    Coronary Artery Disease/HTN/CHF history: Yes  Does the patient see a Cardiologist: Wolfgang hPillips MD  Date of most recent cardiac appt: 05/27/25  On any anticoagulation:  Aspirin 81 mg QD    Diabetes History:  No  Most recent HgbA1C: 5.0  Pulmonary:  COPD/Emphysema/Use of home oxygen: No  Alcohol use: Yes    BMI greater than 40 at time of scheduling:  BMI 23.55  Additional medical concerns:  Additional recommendations for above concerns:  Attended Pre-Hab program:    Anticipated Discharge Disposition:  Home with OPT  Who will be with patient at home following discharge: spouse  Equipment patient already has:  walk in shower with seat and grab bars  Bedroom on first or second floor:  second- 8 steps up  Bathroom on first or second floor:  second- 8 steps  Weight bearing status:  wbat  Pre-op ambulatory status: painful ambulation  Number of entry steps:  1  Caregiver assistance:  full time    Jeny Randle RN  Date: 05/27/25

## 2025-05-29 ENCOUNTER — OFFICE VISIT (OUTPATIENT)
Dept: ORTHOPEDIC SURGERY | Age: 70
End: 2025-05-29
Payer: COMMERCIAL

## 2025-05-29 VITALS — WEIGHT: 168 LBS | BODY MASS INDEX: 23.52 KG/M2 | HEIGHT: 71 IN

## 2025-05-29 DIAGNOSIS — M16.11 PRIMARY OSTEOARTHRITIS OF RIGHT HIP: Primary | ICD-10-CM

## 2025-05-29 PROCEDURE — 1125F AMNT PAIN NOTED PAIN PRSNT: CPT | Performed by: ORTHOPAEDIC SURGERY

## 2025-05-29 PROCEDURE — 99214 OFFICE O/P EST MOD 30 MIN: CPT | Performed by: ORTHOPAEDIC SURGERY

## 2025-05-29 PROCEDURE — 1123F ACP DISCUSS/DSCN MKR DOCD: CPT | Performed by: ORTHOPAEDIC SURGERY

## 2025-05-29 PROCEDURE — 1159F MED LIST DOCD IN RCRD: CPT | Performed by: ORTHOPAEDIC SURGERY

## 2025-05-29 RX ORDER — SODIUM CHLORIDE 9 MG/ML
INJECTION, SOLUTION INTRAVENOUS PRN
Status: CANCELLED | OUTPATIENT
Start: 2025-06-06

## 2025-05-29 RX ORDER — NAPROXEN 500 MG/1
500 TABLET ORAL 2 TIMES DAILY WITH MEALS
Qty: 28 TABLET | Refills: 0 | Status: SHIPPED | OUTPATIENT
Start: 2025-05-29 | End: 2025-06-12

## 2025-05-29 RX ORDER — DEXAMETHASONE SODIUM PHOSPHATE 10 MG/ML
10 INJECTION, SOLUTION INTRAMUSCULAR; INTRAVENOUS ONCE
Status: CANCELLED | OUTPATIENT
Start: 2025-06-06 | End: 2025-05-29

## 2025-05-29 RX ORDER — CELECOXIB 100 MG/1
100 CAPSULE ORAL ONCE
Status: CANCELLED | OUTPATIENT
Start: 2025-06-06 | End: 2025-05-29

## 2025-05-29 RX ORDER — ASPIRIN 81 MG/1
81 TABLET ORAL 2 TIMES DAILY
Qty: 56 TABLET | Refills: 0 | Status: SHIPPED | OUTPATIENT
Start: 2025-05-29 | End: 2025-06-26

## 2025-05-29 RX ORDER — SODIUM CHLORIDE, SODIUM LACTATE, POTASSIUM CHLORIDE, CALCIUM CHLORIDE 600; 310; 30; 20 MG/100ML; MG/100ML; MG/100ML; MG/100ML
INJECTION, SOLUTION INTRAVENOUS CONTINUOUS
Status: CANCELLED | OUTPATIENT
Start: 2025-06-06

## 2025-05-29 RX ORDER — GABAPENTIN 300 MG/1
300 CAPSULE ORAL ONCE
Status: CANCELLED | OUTPATIENT
Start: 2025-06-06 | End: 2025-05-29

## 2025-05-29 RX ORDER — SODIUM CHLORIDE 0.9 % (FLUSH) 0.9 %
5-40 SYRINGE (ML) INJECTION PRN
Status: CANCELLED | OUTPATIENT
Start: 2025-06-06

## 2025-05-29 RX ORDER — CYCLOBENZAPRINE HCL 10 MG
10 TABLET ORAL 3 TIMES DAILY PRN
Qty: 21 TABLET | Refills: 0 | Status: SHIPPED | OUTPATIENT
Start: 2025-05-29 | End: 2025-06-05

## 2025-05-29 RX ORDER — OXYCODONE HCL 10 MG/1
10 TABLET, FILM COATED, EXTENDED RELEASE ORAL ONCE
Status: CANCELLED | OUTPATIENT
Start: 2025-06-06 | End: 2025-05-29

## 2025-05-29 RX ORDER — SENNOSIDES 8.6 MG
1 TABLET ORAL 2 TIMES DAILY
Qty: 14 TABLET | Refills: 0 | Status: SHIPPED | OUTPATIENT
Start: 2025-05-29 | End: 2025-06-05

## 2025-05-29 RX ORDER — HYDROCODONE BITARTRATE AND ACETAMINOPHEN 5; 325 MG/1; MG/1
1 TABLET ORAL EVERY 4 HOURS PRN
Qty: 20 TABLET | Refills: 0 | Status: SHIPPED | OUTPATIENT
Start: 2025-05-29 | End: 2025-06-03

## 2025-05-29 RX ORDER — ACETAMINOPHEN 325 MG/1
1000 TABLET ORAL ONCE
Status: CANCELLED | OUTPATIENT
Start: 2025-06-06 | End: 2025-05-29

## 2025-05-29 RX ORDER — SODIUM CHLORIDE 0.9 % (FLUSH) 0.9 %
5-40 SYRINGE (ML) INJECTION EVERY 12 HOURS SCHEDULED
Status: CANCELLED | OUTPATIENT
Start: 2025-06-06

## 2025-05-29 RX ORDER — CEPHALEXIN 500 MG/1
500 CAPSULE ORAL 4 TIMES DAILY
Qty: 12 CAPSULE | Refills: 0 | Status: SHIPPED | OUTPATIENT
Start: 2025-05-29 | End: 2025-06-01

## 2025-05-29 NOTE — PROGRESS NOTES
Date:  2025    Name:  Clemente Reddy  Address:  [address] [place], [place] [address]    :  1955      Age:   69 y.o.    SSN:  [id number]      Medical Record Number:  [id number]    Reason for Visit:    Chief Complaint    Pre-op Exam (RIGHT MALINDA)    History of Present Illness              DOS:2025     HPI: Clemente Reddy is a 69 y.o. male here today for evaluation of right groin that has been on going for a few months.  To recap, patient states he noted increasing right hip pain that started in around 2024 without any known injury.  He had been seen by his primary care physician who did recommend physical therapy, he tried about 6 sessions of physical therapy noted some relief of his hip pain including increasing hip strength but still continue to have hip and groin related pain.  He also states he gets pain with any kind of lateral movements.  He has tried Advil which does not seem to help his hip pain.     Pain assessment is documented below.     The patient denies any bowel/bladder symptoms, or any numbness, tingling, or weakness down the affected thigh or leg. The patient denies any prior hip injuries, surgeries, or any childhood history of hip disorders.    Clemente Reddy is currently working Retired, from working at the [employer].       Pain Assessment  Location of Pain: Hip  Location Modifiers: Right  Severity of Pain: 4  Quality of Pain: Dull  Frequency of Pain: Intermittent  Aggravating Factors: Walking  Limiting Behavior: Yes  Relieving Factors: Rest  Result of Injury: No  Work-Related Injury: No  Are there other pain locations you wish to document?: No  ROS: Review of systems reviewed from Patient History Form completed today and available in the patient's chart under the Media tab.       Past Medical History:   Diagnosis Date    Colon polyp     GERD (gastroesophageal reflux disease)     Hyperlipidemia     Hypertension     PVD (peripheral vascular disease)         Past

## 2025-06-04 RX ORDER — IBUPROFEN 200 MG
200 TABLET ORAL EVERY 6 HOURS PRN
Status: ON HOLD | COMMUNITY
End: 2025-06-06 | Stop reason: HOSPADM

## 2025-06-04 RX ORDER — MULTIVITAMIN WITH IRON
1 TABLET ORAL DAILY
COMMUNITY

## 2025-06-04 NOTE — PROGRESS NOTES
[facility] Same Day Readiness Screen 2.0  PAT Questionnaire    Does patient have at least one day of 24 hr assist of capable caregiver at d/c?  [x] Yes = 0  [] No = 6    Was patient using an assistive device to walk prior to surgery?  [x] No = 0  [] Yes = 4    How many steps do you have to get to the floor where you plan to initially sleep and use the restroom? (At least 1/2 bath)  [] 0-2 steps= 0 [x] 3+ steps = 1    Has patient fallen in the last 3 months? If yes, how many times?  [x] 0 falls = 0 [] 1+ fall = 1    [] 2+ falls = 4    Does patient have a hx of post-op nausea/vomiting?  [x] No = 0 [] Yes = 2    Other Factors    Age  [x] <70 = 0 [] 71-79 = 1  [] 80+ = 2                     BMI  [x] <30 = 0       []31-39 = 1    [] >40 = 2    Pertinent co-morbidities (consider cardiopulmonary, cardiovascular, neurological, and psychiatric diagnoses)  [] 0 = 0           [] 1-2 = 2       [x] 3+ = 4    Sleep apnea  [x] No = 0         [] Yes = 1    Hx of prolonged emergence from general anesthesia  [x] No = 0         [] Yes = 3      Score: 5      Interpretation:  Red (10-29): Low probability of safe same day discharge  Yellow (6-9): Moderate probability of safe same day discharge  Green (0-5): High probability of safe same day discharge    Score completed by: Candie Shea, PT, DPT

## 2025-06-04 NOTE — PROGRESS NOTES
[facility] video VIEWED. Hibiclens instructions reviewed to use x 5 days preop.  SIMONA screening done. TJ book, IS instructions, TJ video link, and fall contract placed on chart for DOS.

## 2025-06-04 NOTE — PROGRESS NOTES
[facility] PRE-SURGICAL TESTING INSTRUCTIONS                      PRIOR TO PROCEDURE DATE:    1. PLEASE FOLLOW ANY INSTRUCTIONS GIVEN TO YOU PER YOUR SURGEON.      2. Arrange for someone to drive you home and be with you for the first 24 hours after discharge for your safety after your procedure for which you received sedation. Ensure it is someone we can share information with regarding your discharge.     NOTE: At this time ONLY 2 ADULTS may accompany you   One person ENCOURAGED to stay at hospital entire time if outpatient surgery      3. You must contact your surgeon for instructions IF:  You are taking any blood thinners, aspirin, anti-inflammatory or vitamins.  There is a change in your physical condition such as a cold, fever, rash, cuts, sores, or any other infection, especially near your surgical site.    4. Do not drink alcohol the day before or day of your procedure.  Do not use any recreational marijuana at least 24 hours or street drugs (heroin, cocaine) at minimum 5 days prior to your procedure.     5. A Pre-Surgical History and Physical MUST be completed WITHIN 30 DAYS OR LESS prior to your procedure.by your Physician or an Urgent Care        THE DAY OF YOUR PROCEDURE:  1.  Follow instructions for ARRIVAL TIME as DIRECTED BY YOUR SURGEON.     2. Enter the MAIN entrance from [facility] [employer] and follow the signs to the free Parking Garage or  Parking (offered free of charge 7 am-5pm).      3. Enter the Main Entrance of the hospital (do not enter from the lower level of the parking garage). Upon entrance, check in with the  at the surgical information desk on your LEFT.   Bring your insurance card and photo ID to register      4. DO NOT EAT ANYTHING AFTER MIDNIGHT prior to arrival for surgery.    NOTE: ALL Gastric, Bariatric & Bowel surgery patients - you MUST follow your surgeon's instructions regarding eating/ drinking as you will have very specific instructions to follow.  If  Maged Cancino Bubba  you did not receive these, call your surgeon's office immediately.     5. MEDICATIONS:  Take the following medications with a SMALL sip of water: METOPROLOL, PRILOSEC  Use your usual dose of inhalers the morning of surgery. BRING your rescue inhaler with you to hospital.   Anesthesia does NOT want you to take insulin the morning of surgery. They will control your blood sugar while you are at the hospital. Please contact your ordering physician for instructions regarding your insulin the night before your procedure. If you have an insulin pump, please keep it set on basal rate.   Bariatric patient's call your surgeon if on diabetic medications as some may need to be stopped 1 week prior to surgery    6. Do not swallow additional water when brushing teeth. No gum, candy, mints, or ice chips. Refrain from smoking or at least decrease the amount on day of surgery.    7. Morning of surgery:   Take a shower with an antibacterial soap (i.e., Safeguard or Dial) OR your physician may have instructed you to use Hibiclens.  Dress in loose, comfortable clothing appropriate for redressing after your procedure.   Do not wear jewelry (including body piercings), make-up (especially NO eye make-up), fingernail polish (NO toenail polish if foot/leg surgery), lotion, powders, or metal hairclips.   Do not shave or wax for 72 hours prior to procedure near your operative site. Shaving with a razor can irritate your skin and make it easier to develop an infection. On the day of your procedure, any hair that needs to be removed near the surgical site will be 'clipped' by a healthcare worker using a special clipper designed to avoid skin irritation.    8. Dentures, glasses, or contacts will need to be removed before your procedure. Bring cases for your glasses, contacts, dentures, or hearing aids to protect them while you are in surgery.      9. If you use a CPAP, please bring it with you on the day of your procedure.    10. If you use  

## 2025-06-05 RX ORDER — OXYCODONE HYDROCHLORIDE 5 MG/1
5 TABLET ORAL EVERY 4 HOURS PRN
Refills: 0 | Status: CANCELLED | OUTPATIENT
Start: 2025-06-05

## 2025-06-05 RX ORDER — OXYCODONE HYDROCHLORIDE 5 MG/1
10 TABLET ORAL EVERY 4 HOURS PRN
Refills: 0 | Status: CANCELLED | OUTPATIENT
Start: 2025-06-05

## 2025-06-05 RX ORDER — ONDANSETRON 4 MG/1
4 TABLET, ORALLY DISINTEGRATING ORAL EVERY 8 HOURS PRN
Status: CANCELLED | OUTPATIENT
Start: 2025-06-05

## 2025-06-05 RX ORDER — SODIUM CHLORIDE 9 MG/ML
INJECTION, SOLUTION INTRAVENOUS PRN
Status: CANCELLED | OUTPATIENT
Start: 2025-06-05

## 2025-06-05 RX ORDER — MORPHINE SULFATE 4 MG/ML
2 INJECTION INTRAVENOUS
Refills: 0 | Status: CANCELLED | OUTPATIENT
Start: 2025-06-05

## 2025-06-05 RX ORDER — ONDANSETRON 2 MG/ML
4 INJECTION INTRAMUSCULAR; INTRAVENOUS EVERY 6 HOURS PRN
Status: CANCELLED | OUTPATIENT
Start: 2025-06-05

## 2025-06-05 RX ORDER — SODIUM CHLORIDE 0.9 % (FLUSH) 0.9 %
5-40 SYRINGE (ML) INJECTION EVERY 12 HOURS SCHEDULED
Status: CANCELLED | OUTPATIENT
Start: 2025-06-05

## 2025-06-05 RX ORDER — SODIUM CHLORIDE 9 MG/ML
INJECTION, SOLUTION INTRAVENOUS CONTINUOUS
Status: CANCELLED | OUTPATIENT
Start: 2025-06-05

## 2025-06-05 RX ORDER — MORPHINE SULFATE 4 MG/ML
4 INJECTION INTRAVENOUS
Refills: 0 | Status: CANCELLED | OUTPATIENT
Start: 2025-06-05

## 2025-06-05 RX ORDER — SENNA AND DOCUSATE SODIUM 50; 8.6 MG/1; MG/1
1 TABLET, FILM COATED ORAL 2 TIMES DAILY
Status: CANCELLED | OUTPATIENT
Start: 2025-06-05

## 2025-06-05 RX ORDER — SODIUM CHLORIDE 0.9 % (FLUSH) 0.9 %
5-40 SYRINGE (ML) INJECTION PRN
Status: CANCELLED | OUTPATIENT
Start: 2025-06-05

## 2025-06-05 RX ORDER — ASPIRIN 81 MG/1
81 TABLET ORAL 2 TIMES DAILY
Status: CANCELLED | OUTPATIENT
Start: 2025-06-07 | End: 2025-06-21

## 2025-06-05 RX ORDER — CYCLOBENZAPRINE HCL 10 MG
10 TABLET ORAL 3 TIMES DAILY PRN
Status: CANCELLED | OUTPATIENT
Start: 2025-06-05

## 2025-06-05 NOTE — DISCHARGE INSTRUCTIONS
Dr. Kervin Cleveland MD [facility]  Hip Preservation & Sports Medicine Surgeon   [facility]          POST-OPERATIVE INSTRUCTIONS:     Apply ice (over your dressing) for 4-6 weeks after surgery to help reduce swelling.    This can be applied to the affected area 20 minutes out of every hour while you are awake.     Leave your waterproof dressing in place for 7 days. After 7 days, please change to a new, waterproof dressing that will be supplied to you. You should keep your incision covered, and dry for 2 weeks. When changing your bandage, please wash hands prior to touching. There is a purple glued mesh underneath your waterproof bandage. You will likely need to hold this in place while you peel off the outer layer.     Please take all of your post-operative medications as prescribed.  Please do not alter how you take these medications without contacting the physician.  If you have any questions and/or concerns about your post-operative medications, please call our office.    Please do not take any additional Tylenol while you are taking the Norco.  This medication already contains Tylenol and taking additional Tylenol may cause liver damage.    It is okay to use Tylenol once you are no longer taking the Norco.    It is common to feel drowsy while taking pain medication.  Do not drink alcohol or drive while taking pain medication.    Nausea is also a common side effect of using pain medication.  If this occurs, try taking your medication with food.  Also drink plenty of water while taking the pain medication. You may use an over the counter anti-nausea as needed.  If nausea becomes severe, please contact the office and a prescription for Zofran may be prescribed.    To optimize your pain control, you can take your pain medication as prescribed for 2 days, then gradually taper off over the next day as tolerable.  If you feel your pain is not well controlled or begins to worsen following your first  INFECTION   > Fever over 101°     > Redness, swelling, hardness or warmth at the operative site   >Foul smelling or cloudy drainage at the operative site   Unrelieved PAIN  Unrelieved NAUSEA  Blood soaked dressing.  (Some oozing may be normal)  Inability to urinate      Numb, pale, blue, cold or tingling extremity      Physician:  Dr. Cleveland    The above instructions were reviewed with patient/significant other.  The following additional patient specific information was reviewed with the patient/significant other:  [x]Procedure/physician specific instructions  []Medication information sheet(S) including potential side effects  []Lucrecia’s egress test  []Pain Ball management  []FAQ Catheter associated blood stream infections  [x]FAQ Surgical Site Infections  []Other-    I have read and understand the instructions given to me: ____________________________________________   (Patient/S.O. Signature)            Date/time 6/6/2025 2:00 PM                 If you smoke STOP. We care about your health!

## 2025-06-06 ENCOUNTER — ANESTHESIA (OUTPATIENT)
Dept: OPERATING ROOM | Age: 70
End: 2025-06-06
Payer: COMMERCIAL

## 2025-06-06 ENCOUNTER — APPOINTMENT (OUTPATIENT)
Dept: GENERAL RADIOLOGY | Age: 70
End: 2025-06-06
Attending: ORTHOPAEDIC SURGERY
Payer: COMMERCIAL

## 2025-06-06 ENCOUNTER — HOSPITAL ENCOUNTER (OUTPATIENT)
Age: 70
Setting detail: OUTPATIENT SURGERY
Discharge: HOME | End: 2025-06-06
Attending: ORTHOPAEDIC SURGERY | Admitting: ORTHOPAEDIC SURGERY
Payer: COMMERCIAL

## 2025-06-06 ENCOUNTER — ANESTHESIA EVENT (OUTPATIENT)
Dept: OPERATING ROOM | Age: 70
End: 2025-06-06
Payer: COMMERCIAL

## 2025-06-06 VITALS
HEART RATE: 70 BPM | HEIGHT: 71 IN | OXYGEN SATURATION: 98 % | RESPIRATION RATE: 16 BRPM | TEMPERATURE: 96.8 F | WEIGHT: 163.8 LBS | SYSTOLIC BLOOD PRESSURE: 122 MMHG | BODY MASS INDEX: 22.93 KG/M2 | DIASTOLIC BLOOD PRESSURE: 73 MMHG

## 2025-06-06 LAB
ABO/RH: NORMAL
ANION GAP SERPL CALCULATED.3IONS-SCNC: 11 MMOL/L (ref 3–16)
ANTIBODY SCREEN: NORMAL
BUN SERPL-MCNC: 21 MG/DL (ref 7–20)
CALCIUM SERPL-MCNC: 8.4 MG/DL (ref 8.3–10.6)
CHLORIDE SERPL-SCNC: 104 MMOL/L (ref 99–110)
CO2 SERPL-SCNC: 25 MMOL/L (ref 21–32)
CREAT SERPL-MCNC: 1.1 MG/DL (ref 0.8–1.3)
GFR SERPLBLD CREATININE-BSD FMLA CKD-EPI: 72 ML/MIN/{1.73_M2}
GLUCOSE BLD-MCNC: 112 MG/DL (ref 70–99)
GLUCOSE BLD-MCNC: 131 MG/DL (ref 70–99)
GLUCOSE SERPL-MCNC: 134 MG/DL (ref 70–99)
HCT VFR BLD AUTO: 33.7 % (ref 40.5–52.5)
HGB BLD-MCNC: 11.6 G/DL (ref 13.5–17.5)
PERFORMED ON: ABNORMAL
PERFORMED ON: ABNORMAL
POTASSIUM SERPL-SCNC: 3.9 MMOL/L (ref 3.5–5.1)
SODIUM SERPL-SCNC: 140 MMOL/L (ref 136–145)

## 2025-06-06 PROCEDURE — 7100000011 HC PHASE II RECOVERY - ADDTL 15 MIN: Performed by: ORTHOPAEDIC SURGERY

## 2025-06-06 PROCEDURE — 85018 HEMOGLOBIN: CPT

## 2025-06-06 PROCEDURE — 97166 OT EVAL MOD COMPLEX 45 MIN: CPT

## 2025-06-06 PROCEDURE — 80048 BASIC METABOLIC PNL TOTAL CA: CPT

## 2025-06-06 PROCEDURE — 7100000010 HC PHASE II RECOVERY - FIRST 15 MIN: Performed by: ORTHOPAEDIC SURGERY

## 2025-06-06 PROCEDURE — 7100000000 HC PACU RECOVERY - FIRST 15 MIN: Performed by: ORTHOPAEDIC SURGERY

## 2025-06-06 PROCEDURE — 6360000002 HC RX W HCPCS: Performed by: ANESTHESIOLOGY

## 2025-06-06 PROCEDURE — 2500000003 HC RX 250 WO HCPCS

## 2025-06-06 PROCEDURE — 73501 X-RAY EXAM HIP UNI 1 VIEW: CPT

## 2025-06-06 PROCEDURE — 6360000002 HC RX W HCPCS: Performed by: ORTHOPAEDIC SURGERY

## 2025-06-06 PROCEDURE — 2500000003 HC RX 250 WO HCPCS: Performed by: ORTHOPAEDIC SURGERY

## 2025-06-06 PROCEDURE — 97161 PT EVAL LOW COMPLEX 20 MIN: CPT

## 2025-06-06 PROCEDURE — 2709999900 HC NON-CHARGEABLE SUPPLY: Performed by: ORTHOPAEDIC SURGERY

## 2025-06-06 PROCEDURE — 85014 HEMATOCRIT: CPT

## 2025-06-06 PROCEDURE — 3700000000 HC ANESTHESIA ATTENDED CARE: Performed by: ORTHOPAEDIC SURGERY

## 2025-06-06 PROCEDURE — 97116 GAIT TRAINING THERAPY: CPT

## 2025-06-06 PROCEDURE — 86901 BLOOD TYPING SEROLOGIC RH(D): CPT

## 2025-06-06 PROCEDURE — 6370000000 HC RX 637 (ALT 250 FOR IP)

## 2025-06-06 PROCEDURE — 2720000010 HC SURG SUPPLY STERILE: Performed by: ORTHOPAEDIC SURGERY

## 2025-06-06 PROCEDURE — 73502 X-RAY EXAM HIP UNI 2-3 VIEWS: CPT

## 2025-06-06 PROCEDURE — 86900 BLOOD TYPING SEROLOGIC ABO: CPT

## 2025-06-06 PROCEDURE — 2580000003 HC RX 258: Performed by: ORTHOPAEDIC SURGERY

## 2025-06-06 PROCEDURE — 86850 RBC ANTIBODY SCREEN: CPT

## 2025-06-06 PROCEDURE — 2580000003 HC RX 258: Performed by: ANESTHESIOLOGY

## 2025-06-06 PROCEDURE — 6360000002 HC RX W HCPCS

## 2025-06-06 PROCEDURE — 6370000000 HC RX 637 (ALT 250 FOR IP): Performed by: ORTHOPAEDIC SURGERY

## 2025-06-06 PROCEDURE — 7100000001 HC PACU RECOVERY - ADDTL 15 MIN: Performed by: ORTHOPAEDIC SURGERY

## 2025-06-06 PROCEDURE — 64447 NJX AA&/STRD FEMORAL NRV IMG: CPT | Performed by: ANESTHESIOLOGY

## 2025-06-06 PROCEDURE — C1776 JOINT DEVICE (IMPLANTABLE): HCPCS | Performed by: ORTHOPAEDIC SURGERY

## 2025-06-06 PROCEDURE — 3600000004 HC SURGERY LEVEL 4 BASE: Performed by: ORTHOPAEDIC SURGERY

## 2025-06-06 PROCEDURE — 3600000014 HC SURGERY LEVEL 4 ADDTL 15MIN: Performed by: ORTHOPAEDIC SURGERY

## 2025-06-06 PROCEDURE — 3700000001 HC ADD 15 MINUTES (ANESTHESIA): Performed by: ORTHOPAEDIC SURGERY

## 2025-06-06 PROCEDURE — 97535 SELF CARE MNGMENT TRAINING: CPT

## 2025-06-06 DEVICE — SHELL ACET SZ F DIA56MM 5 CLUS H TRITANIUM PRESSFIT PRI: Type: IMPLANTABLE DEVICE | Site: HIP | Status: FUNCTIONAL

## 2025-06-06 DEVICE — HEAD FEM DIA36MM +0MM OFFSET HIP BIOLOX DELT CERAMIC TAPR: Type: IMPLANTABLE DEVICE | Site: HIP | Status: FUNCTIONAL

## 2025-06-06 DEVICE — SCREW BNE L30MM DIA6.5MM STD CANC HIP TI HMSPHR THRD DRVR: Type: IMPLANTABLE DEVICE | Site: HIP | Status: FUNCTIONAL

## 2025-06-06 DEVICE — COMPONENT TOT HIP CAPPED LNR POLYETH H2STRYKER] STRYKER CORP]: Type: IMPLANTABLE DEVICE | Site: HIP | Status: FUNCTIONAL

## 2025-06-06 DEVICE — INSERT ACET F 0 DEG 36 MM HIP X3 TRIDENT: Type: IMPLANTABLE DEVICE | Site: HIP | Status: FUNCTIONAL

## 2025-06-06 DEVICE — STEM FEM HI OFFSET 5 HIP CLLRD INSIGNIA: Type: IMPLANTABLE DEVICE | Site: HIP | Status: FUNCTIONAL

## 2025-06-06 RX ORDER — DEXAMETHASONE SODIUM PHOSPHATE 10 MG/ML
10 INJECTION, SOLUTION INTRAMUSCULAR; INTRAVENOUS ONCE
Status: COMPLETED | OUTPATIENT
Start: 2025-06-06 | End: 2025-06-06

## 2025-06-06 RX ORDER — OXYCODONE HYDROCHLORIDE 5 MG/1
5 TABLET ORAL PRN
Refills: 0 | Status: DISCONTINUED | OUTPATIENT
Start: 2025-06-06 | End: 2025-06-06 | Stop reason: HOSPADM

## 2025-06-06 RX ORDER — SODIUM CHLORIDE, SODIUM LACTATE, POTASSIUM CHLORIDE, CALCIUM CHLORIDE 600; 310; 30; 20 MG/100ML; MG/100ML; MG/100ML; MG/100ML
INJECTION, SOLUTION INTRAVENOUS CONTINUOUS
Status: DISCONTINUED | OUTPATIENT
Start: 2025-06-06 | End: 2025-06-06 | Stop reason: HOSPADM

## 2025-06-06 RX ORDER — ONDANSETRON 2 MG/ML
INJECTION INTRAMUSCULAR; INTRAVENOUS
Status: DISCONTINUED | OUTPATIENT
Start: 2025-06-06 | End: 2025-06-06 | Stop reason: SDUPTHER

## 2025-06-06 RX ORDER — ROCURONIUM BROMIDE 10 MG/ML
INJECTION, SOLUTION INTRAVENOUS
Status: DISCONTINUED | OUTPATIENT
Start: 2025-06-06 | End: 2025-06-06 | Stop reason: SDUPTHER

## 2025-06-06 RX ORDER — FENTANYL CITRATE 50 UG/ML
INJECTION, SOLUTION INTRAMUSCULAR; INTRAVENOUS
Status: DISCONTINUED | OUTPATIENT
Start: 2025-06-06 | End: 2025-06-06 | Stop reason: SDUPTHER

## 2025-06-06 RX ORDER — METHOCARBAMOL 100 MG/ML
INJECTION, SOLUTION INTRAMUSCULAR; INTRAVENOUS
Status: DISCONTINUED | OUTPATIENT
Start: 2025-06-06 | End: 2025-06-06 | Stop reason: SDUPTHER

## 2025-06-06 RX ORDER — OXYCODONE HYDROCHLORIDE 5 MG/1
10 TABLET ORAL PRN
Refills: 0 | Status: DISCONTINUED | OUTPATIENT
Start: 2025-06-06 | End: 2025-06-06 | Stop reason: HOSPADM

## 2025-06-06 RX ORDER — FENTANYL CITRATE 50 UG/ML
25 INJECTION, SOLUTION INTRAMUSCULAR; INTRAVENOUS EVERY 5 MIN PRN
Refills: 0 | Status: DISCONTINUED | OUTPATIENT
Start: 2025-06-06 | End: 2025-06-06 | Stop reason: HOSPADM

## 2025-06-06 RX ORDER — PROPOFOL 10 MG/ML
INJECTION, EMULSION INTRAVENOUS
Status: DISCONTINUED | OUTPATIENT
Start: 2025-06-06 | End: 2025-06-06 | Stop reason: SDUPTHER

## 2025-06-06 RX ORDER — ACETAMINOPHEN 325 MG/1
1000 TABLET ORAL ONCE
Status: COMPLETED | OUTPATIENT
Start: 2025-06-06 | End: 2025-06-06

## 2025-06-06 RX ORDER — SODIUM CHLORIDE 0.9 % (FLUSH) 0.9 %
5-40 SYRINGE (ML) INJECTION EVERY 12 HOURS SCHEDULED
Status: DISCONTINUED | OUTPATIENT
Start: 2025-06-06 | End: 2025-06-06 | Stop reason: HOSPADM

## 2025-06-06 RX ORDER — OXYCODONE HCL 10 MG/1
10 TABLET, FILM COATED, EXTENDED RELEASE ORAL ONCE
Refills: 0 | Status: COMPLETED | OUTPATIENT
Start: 2025-06-06 | End: 2025-06-06

## 2025-06-06 RX ORDER — HYDROMORPHONE HYDROCHLORIDE 1 MG/ML
0.5 INJECTION, SOLUTION INTRAMUSCULAR; INTRAVENOUS; SUBCUTANEOUS EVERY 5 MIN PRN
Refills: 0 | Status: DISCONTINUED | OUTPATIENT
Start: 2025-06-06 | End: 2025-06-06 | Stop reason: HOSPADM

## 2025-06-06 RX ORDER — SODIUM CHLORIDE 9 MG/ML
INJECTION, SOLUTION INTRAVENOUS PRN
Status: DISCONTINUED | OUTPATIENT
Start: 2025-06-06 | End: 2025-06-06 | Stop reason: HOSPADM

## 2025-06-06 RX ORDER — PROCHLORPERAZINE EDISYLATE 5 MG/ML
5 INJECTION INTRAMUSCULAR; INTRAVENOUS
Status: DISCONTINUED | OUTPATIENT
Start: 2025-06-06 | End: 2025-06-06 | Stop reason: HOSPADM

## 2025-06-06 RX ORDER — MIDAZOLAM HYDROCHLORIDE 1 MG/ML
INJECTION, SOLUTION INTRAMUSCULAR; INTRAVENOUS
Status: COMPLETED
Start: 2025-06-06 | End: 2025-06-06

## 2025-06-06 RX ORDER — BUPIVACAINE HYDROCHLORIDE 5 MG/ML
INJECTION, SOLUTION EPIDURAL; INTRACAUDAL; PERINEURAL
Status: COMPLETED | OUTPATIENT
Start: 2025-06-06 | End: 2025-06-06

## 2025-06-06 RX ORDER — SODIUM CHLORIDE 0.9 % (FLUSH) 0.9 %
5-40 SYRINGE (ML) INJECTION PRN
Status: DISCONTINUED | OUTPATIENT
Start: 2025-06-06 | End: 2025-06-06 | Stop reason: HOSPADM

## 2025-06-06 RX ORDER — LIDOCAINE HYDROCHLORIDE 20 MG/ML
INJECTION, SOLUTION INTRAVENOUS
Status: DISCONTINUED | OUTPATIENT
Start: 2025-06-06 | End: 2025-06-06 | Stop reason: SDUPTHER

## 2025-06-06 RX ORDER — BUPIVACAINE HYDROCHLORIDE 5 MG/ML
INJECTION, SOLUTION EPIDURAL; INTRACAUDAL; PERINEURAL
Status: COMPLETED
Start: 2025-06-06 | End: 2025-06-06

## 2025-06-06 RX ORDER — MIDAZOLAM HYDROCHLORIDE 1 MG/ML
INJECTION, SOLUTION INTRAMUSCULAR; INTRAVENOUS
Status: COMPLETED | OUTPATIENT
Start: 2025-06-06 | End: 2025-06-06

## 2025-06-06 RX ORDER — CELECOXIB 100 MG/1
100 CAPSULE ORAL ONCE
Status: COMPLETED | OUTPATIENT
Start: 2025-06-06 | End: 2025-06-06

## 2025-06-06 RX ORDER — NALOXONE HYDROCHLORIDE 0.4 MG/ML
INJECTION, SOLUTION INTRAMUSCULAR; INTRAVENOUS; SUBCUTANEOUS PRN
Status: DISCONTINUED | OUTPATIENT
Start: 2025-06-06 | End: 2025-06-06 | Stop reason: HOSPADM

## 2025-06-06 RX ORDER — GABAPENTIN 300 MG/1
300 CAPSULE ORAL ONCE
Status: COMPLETED | OUTPATIENT
Start: 2025-06-06 | End: 2025-06-06

## 2025-06-06 RX ORDER — ACETAMINOPHEN 325 MG/1
650 TABLET ORAL EVERY 6 HOURS
Status: DISCONTINUED | OUTPATIENT
Start: 2025-06-06 | End: 2025-06-06 | Stop reason: HOSPADM

## 2025-06-06 RX ORDER — MAGNESIUM HYDROXIDE 1200 MG/15ML
LIQUID ORAL CONTINUOUS PRN
Status: COMPLETED | OUTPATIENT
Start: 2025-06-06 | End: 2025-06-06

## 2025-06-06 RX ORDER — GLYCOPYRROLATE 0.2 MG/ML
INJECTION INTRAMUSCULAR; INTRAVENOUS
Status: DISCONTINUED | OUTPATIENT
Start: 2025-06-06 | End: 2025-06-06 | Stop reason: SDUPTHER

## 2025-06-06 RX ORDER — LABETALOL HYDROCHLORIDE 5 MG/ML
10 INJECTION, SOLUTION INTRAVENOUS
Status: DISCONTINUED | OUTPATIENT
Start: 2025-06-06 | End: 2025-06-06 | Stop reason: HOSPADM

## 2025-06-06 RX ADMIN — MIDAZOLAM HYDROCHLORIDE 2 MG: 1 INJECTION, SOLUTION INTRAMUSCULAR; INTRAVENOUS at 09:43

## 2025-06-06 RX ADMIN — PHENYLEPHRINE HYDROCHLORIDE 100 MCG: 10 INJECTION, SOLUTION INTRAVENOUS at 11:41

## 2025-06-06 RX ADMIN — FENTANYL CITRATE 100 MCG: 50 INJECTION, SOLUTION INTRAMUSCULAR; INTRAVENOUS at 10:40

## 2025-06-06 RX ADMIN — HYDROMORPHONE HYDROCHLORIDE 0.5 MG: 1 INJECTION, SOLUTION INTRAMUSCULAR; INTRAVENOUS; SUBCUTANEOUS at 12:58

## 2025-06-06 RX ADMIN — TRANEXAMIC ACID 1000 MG: 1 INJECTION, SOLUTION INTRAVENOUS at 10:50

## 2025-06-06 RX ADMIN — PROPOFOL 200 MG: 10 INJECTION, EMULSION INTRAVENOUS at 10:40

## 2025-06-06 RX ADMIN — SUGAMMADEX 200 MG: 100 INJECTION, SOLUTION INTRAVENOUS at 12:25

## 2025-06-06 RX ADMIN — ACETAMINOPHEN 650 MG: 325 TABLET ORAL at 12:59

## 2025-06-06 RX ADMIN — LIDOCAINE HYDROCHLORIDE 60 MG: 20 INJECTION, SOLUTION INTRAVENOUS at 10:40

## 2025-06-06 RX ADMIN — SODIUM CHLORIDE, SODIUM LACTATE, POTASSIUM CHLORIDE, AND CALCIUM CHLORIDE: .6; .31; .03; .02 INJECTION, SOLUTION INTRAVENOUS at 10:33

## 2025-06-06 RX ADMIN — OXYCODONE HYDROCHLORIDE 10 MG: 10 TABLET, FILM COATED, EXTENDED RELEASE ORAL at 09:38

## 2025-06-06 RX ADMIN — ROCURONIUM BROMIDE 20 MG: 10 INJECTION, SOLUTION INTRAVENOUS at 11:35

## 2025-06-06 RX ADMIN — GLYCOPYRROLATE 0.2 MG: 0.2 INJECTION INTRAMUSCULAR; INTRAVENOUS at 10:44

## 2025-06-06 RX ADMIN — LIDOCAINE HYDROCHLORIDE 100 MG: 20 INJECTION, SOLUTION INTRAVENOUS at 11:11

## 2025-06-06 RX ADMIN — ROCURONIUM BROMIDE 50 MG: 10 INJECTION, SOLUTION INTRAVENOUS at 10:40

## 2025-06-06 RX ADMIN — CELECOXIB 100 MG: 100 CAPSULE ORAL at 09:38

## 2025-06-06 RX ADMIN — SODIUM CHLORIDE, SODIUM LACTATE, POTASSIUM CHLORIDE, AND CALCIUM CHLORIDE: .6; .31; .03; .02 INJECTION, SOLUTION INTRAVENOUS at 11:17

## 2025-06-06 RX ADMIN — ONDANSETRON 4 MG: 2 INJECTION, SOLUTION INTRAMUSCULAR; INTRAVENOUS at 10:50

## 2025-06-06 RX ADMIN — DEXAMETHASONE SODIUM PHOSPHATE 10 MG: 10 INJECTION INTRAMUSCULAR; INTRAVENOUS at 09:52

## 2025-06-06 RX ADMIN — ROCURONIUM BROMIDE 10 MG: 10 INJECTION, SOLUTION INTRAVENOUS at 11:58

## 2025-06-06 RX ADMIN — SODIUM CHLORIDE, SODIUM LACTATE, POTASSIUM CHLORIDE, AND CALCIUM CHLORIDE: .6; .31; .03; .02 INJECTION, SOLUTION INTRAVENOUS at 09:35

## 2025-06-06 RX ADMIN — ACETAMINOPHEN 975 MG: 325 TABLET ORAL at 09:37

## 2025-06-06 RX ADMIN — GABAPENTIN 300 MG: 300 CAPSULE ORAL at 09:38

## 2025-06-06 RX ADMIN — METHOCARBAMOL 500 MG: 100 INJECTION, SOLUTION INTRAMUSCULAR; INTRAVENOUS at 11:16

## 2025-06-06 RX ADMIN — METHOCARBAMOL 500 MG: 100 INJECTION, SOLUTION INTRAMUSCULAR; INTRAVENOUS at 11:15

## 2025-06-06 RX ADMIN — PHENYLEPHRINE HYDROCHLORIDE 100 MCG: 10 INJECTION, SOLUTION INTRAVENOUS at 11:55

## 2025-06-06 RX ADMIN — WATER 2000 MG: 1 INJECTION INTRAMUSCULAR; INTRAVENOUS; SUBCUTANEOUS at 10:50

## 2025-06-06 RX ADMIN — BUPIVACAINE HYDROCHLORIDE 30 ML: 5 INJECTION, SOLUTION EPIDURAL; INTRACAUDAL; PERINEURAL at 09:43

## 2025-06-06 ASSESSMENT — PAIN DESCRIPTION - ORIENTATION
ORIENTATION: RIGHT
ORIENTATION: RIGHT

## 2025-06-06 ASSESSMENT — PAIN SCALES - GENERAL
PAINLEVEL_OUTOF10: 7
PAINLEVEL_OUTOF10: 0
PAINLEVEL_OUTOF10: 5
PAINLEVEL_OUTOF10: 0

## 2025-06-06 ASSESSMENT — PAIN DESCRIPTION - LOCATION
LOCATION: HIP
LOCATION: HIP

## 2025-06-06 ASSESSMENT — LIFESTYLE VARIABLES: SMOKING_STATUS: 0

## 2025-06-06 ASSESSMENT — PAIN DESCRIPTION - PAIN TYPE: TYPE: SURGICAL PAIN

## 2025-06-06 ASSESSMENT — PAIN DESCRIPTION - DESCRIPTORS
DESCRIPTORS: STABBING;SORE
DESCRIPTORS: ACHING

## 2025-06-06 ASSESSMENT — PAIN - FUNCTIONAL ASSESSMENT: PAIN_FUNCTIONAL_ASSESSMENT: 0-10

## 2025-06-06 ASSESSMENT — PAIN DESCRIPTION - ONSET: ONSET: AWAKENED FROM SLEEP

## 2025-06-06 NOTE — PROGRESS NOTES
Vitals:    06/06/25 1445   BP: 122/65   Pulse: 65   Resp: 17   Temp: 97.4 °F (36.3 °C)   SpO2: 99%         Intake/Output Summary (Last 24 hours) at 6/6/2025 1522  Last data filed at 6/6/2025 1233  Gross per 24 hour   Intake 1560 ml   Output 200 ml   Net 1360 ml       Pain assessment:  none  Pain Level: 0    Patient transferred to care of hospitals RN. Pt awake, passed PT/OT. Right hip drsg unchanged. Ice pack in place. Taken to [facility] per pacu transport. Wife updated. All belongings w/pt. Including walker.    6/6/2025 3:22 PM

## 2025-06-06 NOTE — ANESTHESIA POSTPROCEDURE EVALUATION
Department of Anesthesiology  Postprocedure Note    Patient: Clemente Reddy  MRN: [id number]  YOB: 1955  Date of evaluation: 6/6/2025    Procedure Summary       Date: 06/06/25 Room / Location: [place] [address] / [facility]    Anesthesia Start: 1035 Anesthesia Stop: 1239    Procedure: RIGHT TOTAL HIP REPLACEMENT DIRECT ANTERIOR APPROACH, NAVIN DORA ROBOTIC ASSISTED HIP REPLACEMENT (Right: Hip) Diagnosis:       Primary osteoarthritis of right hip      (Primary osteoarthritis of right hip [M16.11])    Surgeons: Kervin Cleveland MD Responsible Provider: Bennie Diallo DO    Anesthesia Type: general, regional ASA Status: 3            Anesthesia Type: No value filed.    Eitan Phase I: Eitan Score: 9    Eitan Phase II:      Vitals:    06/06/25 1345   BP: 133/67   Pulse: 60   Resp: 16   Temp:    SpO2:        Anesthesia Post Evaluation    Patient location during evaluation: PACU  Patient participation: complete - patient participated  Level of consciousness: awake and awake and alert  Pain score: 6  Airway patency: patent  Nausea & Vomiting: no nausea and no vomiting  Cardiovascular status: hemodynamically stable  Respiratory status: acceptable  Hydration status: euvolemic  Pain management: adequate and satisfactory to patient    No notable events documented.

## 2025-06-06 NOTE — ANESTHESIA PRE PROCEDURE
Department of Anesthesiology  Preprocedure Note       Name:  Clemente Reddy   Age:  69 y.o.  :  1955                                          MRN:  [id number]         Date:  2025      Surgeon: Surgeon(s):  Kervin Cleveland MD    Procedure: Procedure(s):  RIGHT TOTAL HIP REPLACEMENT DIRECT ANTERIOR APPROACH, NAVIN DORA ROBOTIC ASSISTED HIP REPLACEMENT    Medications prior to admission:   Prior to Admission medications    Medication Sig Start Date End Date Taking? Authorizing Provider   Multiple Vitamin (MULTIVITAMIN) TABS tablet Take 1 tablet by mouth daily   Yes Jose Miguel Fernandez MD   ibuprofen (ADVIL;MOTRIN) 200 MG tablet Take 1 tablet by mouth every 6 hours as needed for Pain   Yes Jose Miguel Fernandez MD   aspirin 81 MG tablet Take 1 tablet by mouth daily   Yes Jose Miguel Fernandez MD   naproxen (NAPROSYN) 500 MG tablet Take 1 tablet by mouth 2 times daily (with meals) for 14 days 25  Gumaro Hardy DO   aspirin 81 MG EC tablet Take 1 tablet by mouth 2 times daily for 28 days 25  Gumaro Hardy DO   metoprolol succinate (TOPROL XL) 25 MG extended release tablet Take 1 tablet by mouth daily 25   Shyla Medrano MD   omeprazole (PRILOSEC) 20 MG delayed release capsule TAKE 1 CAPSULE EVERY       MORNING BEFORE BREAKFAST 25   Shyla Medrano MD   hydroCHLOROthiazide (HYDRODIURIL) 25 MG tablet TAKE 1 TABLET DAILY 25   Shyla Medrano MD   losartan (COZAAR) 100 MG tablet Take 1 tablet by mouth daily 3/21/25   Shyla Medrano MD   atorvastatin (LIPITOR) 20 MG tablet TAKE 1 TABLET DAILY 25   Shyla Medrano MD       Current medications:    Current Facility-Administered Medications   Medication Dose Route Frequency Provider Last Rate Last Admin    lactated ringers infusion   IntraVENous Continuous Jaron Harrison MD        ortho mix (with HYDROmorphone) injection   Injection On Call Kervin Cleveland MD        oxyCODONE (OXYCONTIN) extended release

## 2025-06-06 NOTE — PROGRESS NOTES
Physical Therapy  Facility/Department: [facility] GENERAL SURGERY  Physical Therapy Initial Assessment/Discharge     Name: Celmente Reddy  : 1955  MRN: [id number]  Date of Service: 2025    Discharge Recommendations:  24 hour supervision or assist, Outpatient PT   PT Equipment Recommendations  Equipment Needed: Yes  Mobility Devices: Walker  Walker: Rolling      Patient Diagnosis(es): There were no encounter diagnoses.  Past Medical History:  has a past medical history of Arthritis, Colon polyp, GERD (gastroesophageal reflux disease), Hyperlipidemia, Hypertension, and PVD (peripheral vascular disease).  Past Surgical History:  has a past surgical history that includes cyst removal (posterior right ear ); Tonsillectomy; back surgery (2013); other surgical history (Bilateral, 2013); Colonoscopy; Colonoscopy (N/A, 2022); and laparoscopic appendectomy (N/A, 2022).    Assessment  Assessment: Pt is a 68 yo male s/p R MALINDA seen POD 0 performing transfers and ambulation at  including stair negotiation with CGA and inc effort. No buckling or LOB noted. pt expresses no concerns for return home, will have initial 24hr A and plans to seek OP PT. No further acute PT needs demonstrated, PT to sign off.  Therapy Prognosis: Good  Decision Making: Low Complexity  Requires PT Follow-Up: No  Activity Tolerance  Activity Tolerance: Patient tolerated evaluation without incident    Plan  Physical Therapy Plan  General Plan: Discharge with evaluation only  Safety Devices  Type of Devices: Call light within reach, Nurse notified (left in pacu on stretcher)    Restrictions  Position Activity Restriction  Other Position/Activity Restrictions: WBAT, anterior hip prec     Subjective  General  Chart Reviewed: Yes  Additional Pertinent Hx: Pt is a 68 yo male s/p R MALINDA  Referring Practitioner: MD Megha  Diagnosis: OA of R hip  Follows Commands: Within Functional Limits  Subjective  Subjective: pt supine in

## 2025-06-06 NOTE — FLOWSHEET NOTE
Procedure: peripheral block  MD: Dr. Diallo  Timeout performed 0942  Start time 0942  End time 0947  Pt monitored closely on heart monitor, 2L NC, continuous pulse oximetry, EtCO2, and frequent BPs.   Pt remained alert and oriented x4. pt tolerated procedure well.

## 2025-06-06 NOTE — OP NOTE
checkpoint on the femur was removed as per correct counts, and as well the tracking pins on the opposite ASIS/iliac crest.    The area was thoroughly irrigated again.  The aquamantis was used to verify adequacy of hemostasis by treating the capsular structures.  An injection of orthopedic analgesic mixture was carefully infiltrated with careful aspiration around the capsular area to improve postoperative analgesia.  A solution of dilute chlorhexidine was placed in the wound and allowed to sit for 2-3 minutes to aid in bacterial control.  It was then removed with suction and cleansed again with pulsatile lavage.  The capsule was then approximated with new #2 ethibond traction stitches.    The tensor fascia gael fascia was then closed with running #2 Strata Fix suture.  Skin was closed with 2-0 Vicryl and 3-0 Monocryl. Steri strips were applied  and allowed to dry, then a therabond were used to seal the wound.      The patient was then removed from the operative table, awakened and taken to recovery room in stable condition having tolerated the procedure well.  The patient's foot was noted to be warm and pink color removal of the traction boot.  No significant skin lesions were noted either on the feet.    All needle and sponge counts matched the initial count per circulating and scrub personnel x2 prior to terminating this case.    Emily Roman PA-C assisted me during this surgery. Due to the intricate and complex nature of this procedure,  Her services were required as a first assist with patient postioning , prepping, draping, retraction,  and a meticulous layered closure. The surgery could be not be executed without to skilled sets of hands.       Sincerely,           Kervin Cleveland MD [facility]  Orthopaedic Surgeon - Hip Preservation & Sports Medicine   [facility]   [address] [place] [address], [address], [address]  Email: [email or web address]  Office:  [phone]      06/06/25  11:57 AM        Electronically signed by Kervin Cleveland MD on 6/6/2025 at 11:57 AM

## 2025-06-06 NOTE — ANESTHESIA PROCEDURE NOTES
Peripheral Block    Patient location during procedure: pre-op  Reason for block: post-op pain management and at surgeon's request  Start time: 6/6/2025 9:43 AM  End time: 6/6/2025 9:47 AM  Staffing  Performed: anesthesiologist   Anesthesiologist: Bennie Diallo DO  Performed by: Bennie Diallo DO  Authorized by: Bennie Diallo DO    Preanesthetic Checklist  Completed: patient identified, IV checked, site marked, risks and benefits discussed, surgical/procedural consents, equipment checked, pre-op evaluation, timeout performed, anesthesia consent given, oxygen available, monitors applied/VS acknowledged, fire risk safety assessment completed and verbalized and blood product R/B/A discussed and consented  Peripheral Block   Patient position: supine  Prep: ChloraPrep  Provider prep: mask and sterile gloves  Patient monitoring: cardiac monitor, continuous pulse ox, continuous capnometry, frequent blood pressure checks, IV access, oxygen and responsive to questions  Block type: PENG  Laterality: right  Injection technique: single-shot  Guidance: ultrasound guided  Local infiltration: bupivacaine  Local infiltration: bupivacaine    Needle   Needle type: insulated echogenic nerve stimulator needle   Needle gauge: 20 G  Needle localization: ultrasound guidance  Needle length: 10 cm  Assessment   Injection assessment: negative aspiration for heme, no paresthesia on injection and no intravascular symptoms  Paresthesia pain: none  Slow fractionated injection: yes  Hemodynamics: stable  Outcomes: uncomplicated and patient tolerated procedure well    Medications Administered  midazolam (VERSED) injection 2 mg/2mL - IntraVENous   2 mg - 6/6/2025 9:43:00 AM  BUPivacaine (MARCAINE) PF injection 0.5% - Perineural   30 mL - 6/6/2025 9:43:00 AM

## 2025-06-06 NOTE — PROGRESS NOTES
Occupational Therapy  Facility/Department: [facility] GENERAL SURGERY  Occupational Therapy Initial Assessment /Treatment/Discharge     Name: Clemente Reddy  : 1955  MRN: [id number]  Date of Service: 2025    Discharge Recommendations:  24 hour supervision or assist  OT Equipment Recommendations  Equipment Needed: No       Patient Diagnosis(es): There were no encounter diagnoses.  Past Medical History:  has a past medical history of Arthritis, Colon polyp, GERD (gastroesophageal reflux disease), Hyperlipidemia, Hypertension, and PVD (peripheral vascular disease).  Past Surgical History:  has a past surgical history that includes cyst removal (posterior right ear ); Tonsillectomy; back surgery (2013); other surgical history (Bilateral, 2013); Colonoscopy; Colonoscopy (N/A, 2022); and laparoscopic appendectomy (N/A, 2022).           Assessment  Assessment: Pt seen POD 0 Rt THR.  Pt very alert and pain 2/10.  Pt req CG-SBA for functional mobility.  Pt demonstrated understanding of use of reacher for ADL.  Pt to be discharged home with assist from his wife.  Prognosis: Good  Decision Making: Medium Complexity  No Skilled OT: Safe to return home;No OT goals identified  REQUIRES OT FOLLOW-UP: No  Activity Tolerance  Activity Tolerance: Patient Tolerated treatment well     Plan  Occupational Therapy Plan  Additional Comments: Discharge pt from acute OT    Restrictions  Position Activity Restriction  Other Position/Activity Restrictions: WBAT, anterior hip prec    Subjective  General  Chart Reviewed: Yes  Additional Pertinent Hx: 25:  RIGHT TOTAL HIP REPLACEMENT DIRECT ANTERIOR APPROACH, NAVIN DORA ROBOTIC ASSISTED HIP REPLACEMENT  Family / Caregiver Present: No  Referring Practitioner: IGOR Ambriz  Diagnosis: osteoarthritis of right hip  Subjective  Subjective: Pt on stretcher upon entry.   Pt reports feeling well, except for dry mouth   Pain: 2/10     Social/Functional

## 2025-06-06 NOTE — H&P
H&P Update     An electronic and hard copy history and physical was reviewed in the patient's chart.  Date of Surgery Update: June 6, 2025  Clemente Reddy was seen and examined.  Patient identified by surgeon; surgical site was confirmed by patient and surgeon.  ?  Signed By: Sincerely,    Kervin Cleveland MD [facility]  Orthopaedic Surgeon - Hip Preservation & Sports Medicine   [facility] [address], [address], [address]  Office: [phone]    06/06/25  9:07 AM     ?    ?  ?

## 2025-06-09 ENCOUNTER — OFFICE VISIT (OUTPATIENT)
Dept: ORTHOPEDIC SURGERY | Age: 70
End: 2025-06-09

## 2025-06-09 VITALS — BODY MASS INDEX: 24.34 KG/M2 | HEIGHT: 70 IN | WEIGHT: 170 LBS

## 2025-06-09 DIAGNOSIS — Z96.641 STATUS POST HIP REPLACEMENT, RIGHT: Primary | ICD-10-CM

## 2025-06-09 DIAGNOSIS — M16.11 PRIMARY OSTEOARTHRITIS OF RIGHT HIP: ICD-10-CM

## 2025-06-09 PROCEDURE — 99024 POSTOP FOLLOW-UP VISIT: CPT | Performed by: ORTHOPAEDIC SURGERY

## 2025-06-09 NOTE — PROGRESS NOTES
History of Present Illness:  Clemente Reddy is a pleasant 69 y.o. male who presents for a post operative visit. He is  3 days out following a RIGHT . Overall He is doing okay and feels that their pain is well controlled with current pain medications. He has been compliant with the weight bearing instructions and anterior precautions, but left his walker in the car for today's visit. He plans to do physical therapy at our offices.     He denies fevers, chills, numbness, tingling, and shortness of breath.    Medical History:  Patient's medications, allergies, past medical, surgical, social and family histories were reviewed and updated as appropriate.    No notes on file    Review of Systems  A 14 point review of systems was completed by the patient and is available in the media section of the scanned medical record and was reviewed on 6/9/2025.      Vital Signs:  There were no vitals filed for this visit.    General/Appearance: Alert and oriented and in no apparent distress.    Skin:  There are no skin lesions, cellulitis, or extreme edema. The patient has warm and well-perfused Bilateral lower  extremities with brisk capillary refill.      Hip  Exam: right    Inspection: Hip incision(s)   are clean, dry and intact and well approximated. The Therabond dressing  is still in place. Mild ecchymosis and swelling are present as can be expected. There is no erythema, drainage or other signs of infection    Palpation:  No crepitus to gentle motion / circumduction of the hip    Active Range of Motion: Deferred    Passive Range of Motion: 0-85deg well tolerated    Strength:  Deferred    Special Tests:  Deferred. Leg lengths equal    Neurovascular: Sensation to light touch is intact, no motor deficits, palpable radial pulses 2+    Radiology:     Plain radiographs of the right hip comprising 3 views (AP Pelvis, Bustamante View and False Profile view of the right hip) were obtained and reviewed in the office: Shows postsurgical

## 2025-06-11 ENCOUNTER — HOSPITAL ENCOUNTER (OUTPATIENT)
Dept: PHYSICAL THERAPY | Age: 70
Setting detail: THERAPIES SERIES
Discharge: HOME | End: 2025-06-11
Payer: COMMERCIAL

## 2025-06-11 DIAGNOSIS — R26.2 DIFFICULTY WALKING: ICD-10-CM

## 2025-06-11 DIAGNOSIS — R29.898 WEAKNESS OF RIGHT LOWER EXTREMITY: Primary | ICD-10-CM

## 2025-06-11 DIAGNOSIS — Z96.641 STATUS POST TOTAL HIP REPLACEMENT, RIGHT: ICD-10-CM

## 2025-06-11 PROCEDURE — 97161 PT EVAL LOW COMPLEX 20 MIN: CPT

## 2025-06-11 PROCEDURE — 97530 THERAPEUTIC ACTIVITIES: CPT

## 2025-06-11 PROCEDURE — 97110 THERAPEUTIC EXERCISES: CPT

## 2025-06-11 NOTE — PLAN OF CARE
Functional goals/ Treatment Progress Update:  [] Patient is progressing as expected towards functional goals listed.    [] Progression is slowed due to complexities/Impairments listed.  [] Progression has been slowed due to co-morbidities.  [x] Plan just implemented, too soon (<30days) to assess goals progression   [] Goals require adjustment due to lack of progress  [] Patient is not progressing as expected and requires additional follow up with physician  [] Other:     TREATMENT PLAN     Frequency/Duration: 1-2x/week for 8 weeks for the following treatment interventions:    Interventions:  Therapeutic Exercise (88083) including: strength training, ROM, and functional mobility  Therapeutic Activities (89840) including: functional mobility training and education.  Neuromuscular Re-education (38542) activation and proprioception, including postural re-education.    Manual Therapy (52169) as indicated to include: Passive Range of Motion, Gr I-IV mobilizations, and Soft Tissue Mobilization  Modalities as needed that may include: Cryotherapy, Electrical Stimulation, Biofeedback, and Vasoneumatic Compression  Patient education on joint protection, postural re-education, activity modification, and progression of HEP    Plan: POC initiated as per evaluation    Electronically Signed by Christian Woods, PT, DPT, OCS, OMT-C  Date: 06/11/2025   Note: Portions of this note have been templated and/or copied from initial evaluation, reassessments and prior notes for documentation efficiency.  Note: If patient does not return for scheduled/recommended follow up visits, this note will serve as a discharge from care along with the most recent update on progress.    Ortho Evaluation

## 2025-06-17 ENCOUNTER — HOSPITAL ENCOUNTER (OUTPATIENT)
Dept: PHYSICAL THERAPY | Age: 70
Setting detail: THERAPIES SERIES
Discharge: HOME | End: 2025-06-17
Payer: COMMERCIAL

## 2025-06-17 PROCEDURE — 97110 THERAPEUTIC EXERCISES: CPT | Performed by: SPECIALIST/TECHNOLOGIST

## 2025-06-17 PROCEDURE — 97530 THERAPEUTIC ACTIVITIES: CPT | Performed by: SPECIALIST/TECHNOLOGIST

## 2025-06-17 NOTE — FLOWSHEET NOTE
[facility] - Outpatient Rehabilitation and Therapy: [address], [place] [address] office: [phone] fax: [phone]       Physical Therapy: TREATMENT/PROGRESS NOTE   Patient: Clemente Reddy (69 y.o. male)   Examination Date: 2025   :  1955 MRN: [id number]   Visit #: 2   Insurance Allowable Auth Needed   BMN ($40 copay) []Yes    [x]No    Insurance: Payor: MEDIGOLD / Plan: MEDIGOLD / Product Type: *No Product type* /   Insurance ID: [id number] - (Medicare Managed)  Secondary Insurance (if applicable):    Treatment Diagnosis: -    ICD-10-CM    1. Weakness of right lower extremity  R29.898       2. Difficulty walking  R26.2       3. Status post total hip replacement, right  Z96.641          Medical Diagnosis:  Status post hip replacement, right [Z96.641]  Primary osteoarthritis of right hip [M16.11]   Referring Physician: Kervin Cleveland MD  PCP: Shyla Medrano MD     Plan of care signed (Y/N): Y    Date of Patient follow up with Physician: Megha      Plan of Care Report: no  POC update due: (10 visits /OR AUTH LIMITS, whichever is less) - 2025                                             Medical History:  Comorbidities:  Hypertension  Osteoarthritis  Relevant Medical History: PVD, Hx of back surgery                                          Precautions/ Contra-indications:           Latex allergy:  NO  Pacemaker:    NO  Contraindications for Manipulation: None  Date of Surgery:  R MALINDA direct anterior approach   Other:    Red Flags:  None    Suicide Screening:   The patient did not verbalize a primary behavioral concern, suicidal ideation, suicidal intent, or demonstrate suicidal behaviors.    Preferred Language for Healthcare:   [x] English       [] other:    SUBJECTIVE EXAMINATION     Patient stated complaint: Pt. Reports that his Rt hip is sore but feeling pretty good overall.  Pt. Admits to not using walker since day after surgery. Pt admits to

## 2025-06-19 ENCOUNTER — HOSPITAL ENCOUNTER (OUTPATIENT)
Dept: PHYSICAL THERAPY | Age: 70
Setting detail: THERAPIES SERIES
Discharge: HOME | End: 2025-06-19
Payer: COMMERCIAL

## 2025-06-19 PROBLEM — Z01.818 PREOP EXAM FOR INTERNAL MEDICINE: Status: RESOLVED | Noted: 2025-05-12 | Resolved: 2025-06-19

## 2025-06-19 PROCEDURE — 97110 THERAPEUTIC EXERCISES: CPT

## 2025-06-19 NOTE — FLOWSHEET NOTE
[facility] - Outpatient Rehabilitation and Therapy: [address], [place] [address] office: [phone] fax: [phone]       Physical Therapy: TREATMENT/PROGRESS NOTE   Patient: Clemente Reddy (69 y.o. male)   Examination Date: 2025   :  1955 MRN: [id number]   Visit #: 3   Insurance Allowable Auth Needed   BMN ($40 copay) []Yes    [x]No    Insurance: Payor: MEDIGOLD / Plan: MEDIGOLD / Product Type: *No Product type* /   Insurance ID: [id number] - (Medicare Managed)  Secondary Insurance (if applicable):    Treatment Diagnosis: -    ICD-10-CM    1. Weakness of right lower extremity  R29.898       2. Difficulty walking  R26.2       3. Status post total hip replacement, right  Z96.641          Medical Diagnosis:  Status post hip replacement, right [Z96.641]  Primary osteoarthritis of right hip [M16.11]   Referring Physician: Kervin Cleveland MD  PCP: Shyla Medrano MD     Plan of care signed (Y/N): Y    Date of Patient follow up with Physician: Megha      Plan of Care Report: no  POC update due: (10 visits /OR AUTH LIMITS, whichever is less) - 2025                                             Medical History:  Comorbidities:  Hypertension  Osteoarthritis  Relevant Medical History: PVD, Hx of back surgery                                          Precautions/ Contra-indications:           Latex allergy:  NO  Pacemaker:    NO  Contraindications for Manipulation: None  Date of Surgery:  R MALINDA direct anterior approach   Other:    Red Flags:  None    Suicide Screening:   The patient did not verbalize a primary behavioral concern, suicidal ideation, suicidal intent, or demonstrate suicidal behaviors.    Preferred Language for Healthcare:   [x] English       [] other:    SUBJECTIVE EXAMINATION     Patient stated complaint: Pt states that he has been feeling good and has no pain at start of session. Pt reports only feels pain in the morning when he first gets up

## 2025-06-23 ENCOUNTER — OFFICE VISIT (OUTPATIENT)
Dept: ORTHOPEDIC SURGERY | Age: 70
End: 2025-06-23

## 2025-06-23 ENCOUNTER — HOSPITAL ENCOUNTER (OUTPATIENT)
Dept: PHYSICAL THERAPY | Age: 70
Setting detail: THERAPIES SERIES
Discharge: HOME | End: 2025-06-23
Payer: COMMERCIAL

## 2025-06-23 VITALS — WEIGHT: 170 LBS | BODY MASS INDEX: 23.8 KG/M2 | HEIGHT: 71 IN

## 2025-06-23 DIAGNOSIS — M16.11 PRIMARY OSTEOARTHRITIS OF RIGHT HIP: ICD-10-CM

## 2025-06-23 DIAGNOSIS — Z96.641 STATUS POST HIP REPLACEMENT, RIGHT: Primary | ICD-10-CM

## 2025-06-23 PROCEDURE — 99024 POSTOP FOLLOW-UP VISIT: CPT | Performed by: ORTHOPAEDIC SURGERY

## 2025-06-23 PROCEDURE — 97110 THERAPEUTIC EXERCISES: CPT

## 2025-06-23 NOTE — PROGRESS NOTES
History of Present Illness:  Clemente Reddy is a pleasant 69 y.o. male who presents for a post operative visit. He is 2 weeks  out following a right MALINDA, DAA. Overall He is doing okay and feels that their pain is well controlled with current pain medications. He has been compliant with the  weight bearing instructions and anterior precautions. He has been in physical therapy at our   office.     He denies fevers, chills, numbness, tingling, and shortness of breath.    Medical History:  Patient's medications, allergies, past medical, surgical, social and family histories were reviewed and updated as appropriate.    No notes on file    Review of Systems  A 14 point review of systems was completed by the patient and is available in the media section of the scanned medical record and was reviewed on 6/23/2025.      Vital Signs:  There were no vitals filed for this visit.    General/Appearance: Alert and oriented and in no apparent distress.    Skin:  There are no skin lesions, cellulitis, or extreme edema. The patient has warm and well-perfused Bilateral lower  extremities with brisk capillary refill.      Hip  Exam: right    Inspection: Hip incision(s)   are clean, dry and intact and well approximated. The Therabond dressing, Prineo/Dermabond dressing has now been removed. Mild ecchymosis and swelling are present as can be expected. There is no erythema, drainage or other signs of infection    Palpation:  No crepitus to gentle motion / circumduction of the hip    Active Range of Motion: Deferred    Passive Range of Motion:  0-90deg v limber    Strength:  WNL     Special Tests:  Deferred.    Neurovascular: Sensation to light touch is intact, no motor deficits, palpable radial pulses 2+    Radiology:     Plain radiographs of the right hip comprising 3 views (AP Pelvis, Bustamante View and False Profile view of the right hip) were obtained and reviewed in the office: Shows postsurgical changes from the right hip

## 2025-06-23 NOTE — FLOWSHEET NOTE
[facility] - Outpatient Rehabilitation and Therapy: [address], [place] [address] office: [phone] fax: [phone]       Physical Therapy: TREATMENT/PROGRESS NOTE   Patient: Clemente Reddy (69 y.o. male)   Examination Date: 2025   :  1955 MRN: [id number]   Visit #: 4   Insurance Allowable Auth Needed   BMN ($40 copay) []Yes    [x]No    Insurance: Payor: MEDIGOLD / Plan: MEDIGOLD / Product Type: *No Product type* /   Insurance ID: [id number] - (Medicare Managed)  Secondary Insurance (if applicable):    Treatment Diagnosis: -    ICD-10-CM    1. Weakness of right lower extremity  R29.898       2. Difficulty walking  R26.2       3. Status post total hip replacement, right  Z96.641          Medical Diagnosis:  Status post hip replacement, right [Z96.641]  Primary osteoarthritis of right hip [M16.11]   Referring Physician: Kervin Cleveland MD  PCP: Shyla Medrano MD     Plan of care signed (Y/N): Y    Date of Patient follow up with Physician: Megha      Plan of Care Report: no  POC update due: (10 visits /OR AUTH LIMITS, whichever is less) - 2025                                             Medical History:  Comorbidities:  Hypertension  Osteoarthritis  Relevant Medical History: PVD, Hx of back surgery                                          Precautions/ Contra-indications:           Latex allergy:  NO  Pacemaker:    NO  Contraindications for Manipulation: None  Date of Surgery:  R MALINDA direct anterior approach   Other:    Red Flags:  None    Suicide Screening:   The patient did not verbalize a primary behavioral concern, suicidal ideation, suicidal intent, or demonstrate suicidal behaviors.    Preferred Language for Healthcare:   [x] English       [] other:    SUBJECTIVE EXAMINATION     Patient stated complaint: Pt states R hip is doing well in regards to pain with primary c/o being mild incisional soreness which he feels is due to the dressing that is

## 2025-06-26 ENCOUNTER — TELEPHONE (OUTPATIENT)
Dept: ORTHOPEDIC SURGERY | Age: 70
End: 2025-06-26

## 2025-06-26 ENCOUNTER — HOSPITAL ENCOUNTER (OUTPATIENT)
Dept: PHYSICAL THERAPY | Age: 70
Setting detail: THERAPIES SERIES
Discharge: HOME | End: 2025-06-26
Payer: COMMERCIAL

## 2025-06-26 PROCEDURE — 97110 THERAPEUTIC EXERCISES: CPT

## 2025-06-26 NOTE — FLOWSHEET NOTE
[facility] - Outpatient Rehabilitation and Therapy: [address], [place] [address] office: [phone] fax: [phone]       Physical Therapy: TREATMENT/PROGRESS NOTE   Patient: Clemente Reddy (69 y.o. male)   Examination Date: 2025   :  1955 MRN: [id number]   Visit #: 5   Insurance Allowable Auth Needed   BMN ($40 copay) []Yes    [x]No    Insurance: Payor: MEDIGOLD / Plan: MEDIGOLD / Product Type: *No Product type* /   Insurance ID: [id number] - (Medicare Managed)  Secondary Insurance (if applicable):    Treatment Diagnosis: -    ICD-10-CM    1. Weakness of right lower extremity  R29.898       2. Difficulty walking  R26.2       3. Status post total hip replacement, right  Z96.641          Medical Diagnosis:  Status post hip replacement, right [Z96.641]  Primary osteoarthritis of right hip [M16.11]   Referring Physician: Kervin Cleveland MD  PCP: Shyla Medrano MD     Plan of care signed (Y/N): Y    Date of Patient follow up with Physician: Megha      Plan of Care Report: no  POC update due: (10 visits /OR AUTH LIMITS, whichever is less) - 2025                                             Medical History:  Comorbidities:  Hypertension  Osteoarthritis  Relevant Medical History: PVD, Hx of back surgery                                          Precautions/ Contra-indications:           Latex allergy:  NO  Pacemaker:    NO  Contraindications for Manipulation: None  Date of Surgery:  R MALINDA direct anterior approach   Other:    Red Flags:  None    Suicide Screening:   The patient did not verbalize a primary behavioral concern, suicidal ideation, suicidal intent, or demonstrate suicidal behaviors.    Preferred Language for Healthcare:   [x] English       [] other:    SUBJECTIVE EXAMINATION     Patient stated complaint: Pt states he has a little pain around the incision area but feels more surface level pain rather than deep. Pt states he felt good after last

## 2025-07-01 ENCOUNTER — HOSPITAL ENCOUNTER (OUTPATIENT)
Dept: PHYSICAL THERAPY | Age: 70
Setting detail: THERAPIES SERIES
Discharge: HOME | End: 2025-07-01
Payer: COMMERCIAL

## 2025-07-01 PROCEDURE — 97530 THERAPEUTIC ACTIVITIES: CPT

## 2025-07-01 PROCEDURE — 97110 THERAPEUTIC EXERCISES: CPT

## 2025-07-01 NOTE — FLOWSHEET NOTE
and/or copied from initial evaluation, reassessments and prior notes for documentation efficiency.  Note: If patient does not return for scheduled/recommended follow up visits, this note will serve as a discharge from care along with the most recent update on progress.    Ortho Evaluation

## 2025-07-03 ENCOUNTER — HOSPITAL ENCOUNTER (OUTPATIENT)
Dept: PHYSICAL THERAPY | Age: 70
Setting detail: THERAPIES SERIES
Discharge: HOME | End: 2025-07-03
Payer: COMMERCIAL

## 2025-07-03 PROCEDURE — 97110 THERAPEUTIC EXERCISES: CPT

## 2025-07-03 PROCEDURE — 97530 THERAPEUTIC ACTIVITIES: CPT

## 2025-07-03 NOTE — FLOWSHEET NOTE
[facility] - Outpatient Rehabilitation and Therapy: [address], [place] [address] office: [phone] fax: [phone]       Physical Therapy: TREATMENT/PROGRESS NOTE   Patient: Clemente Reddy (69 y.o. male)   Examination Date: 2025   :  1955 MRN: [id number]   Visit #: 7   Insurance Allowable Auth Needed   BMN ($40 copay) []Yes    [x]No    Insurance: Payor: MEDIGOLD / Plan: MEDIGOLD / Product Type: *No Product type* /   Insurance ID: [id number] - (Medicare Managed)  Secondary Insurance (if applicable):    Treatment Diagnosis: -    ICD-10-CM    1. Weakness of right lower extremity  R29.898       2. Difficulty walking  R26.2       3. Status post total hip replacement, right  Z96.641          Medical Diagnosis:  Status post hip replacement, right [Z96.641]  Primary osteoarthritis of right hip [M16.11]   Referring Physician: Kervin Cleveland MD  PCP: Shyla Medrano MD     Plan of care signed (Y/N): Y    Date of Patient follow up with Physician: Megha      Plan of Care Report: no  POC update due: (10 visits /OR AUTH LIMITS, whichever is less) - 2025                                             Medical History:  Comorbidities:  Hypertension  Osteoarthritis  Relevant Medical History: PVD, Hx of back surgery                                          Precautions/ Contra-indications:           Latex allergy:  NO  Pacemaker:    NO  Contraindications for Manipulation: None  Date of Surgery:  R MALINDA direct anterior approach   Other:    Red Flags:  None    Suicide Screening:   The patient did not verbalize a primary behavioral concern, suicidal ideation, suicidal intent, or demonstrate suicidal behaviors.    Preferred Language for Healthcare:   [x] English       [] other:    SUBJECTIVE EXAMINATION     Patient stated complaint: Pt reports he feels a little sore in the thighs but overall feels good. Pt believes he needs to work on bending from his knees more, especially

## 2025-07-07 ENCOUNTER — OFFICE VISIT (OUTPATIENT)
Dept: ORTHOPEDIC SURGERY | Age: 70
End: 2025-07-07

## 2025-07-07 VITALS — HEIGHT: 71 IN | BODY MASS INDEX: 23.8 KG/M2 | WEIGHT: 170 LBS

## 2025-07-07 DIAGNOSIS — M16.11 PRIMARY OSTEOARTHRITIS OF RIGHT HIP: ICD-10-CM

## 2025-07-07 DIAGNOSIS — Z96.641 STATUS POST HIP REPLACEMENT, RIGHT: Primary | ICD-10-CM

## 2025-07-07 PROCEDURE — 99024 POSTOP FOLLOW-UP VISIT: CPT

## 2025-07-07 NOTE — PROGRESS NOTES
History of Present Illness:  Clemente Reddy is a pleasant 69 y.o. male who presents for a post operative visit. He is 4 weeks  out following a right MALINDA, DAA. Overall He is doing swell. He has been compliant with the  weight bearing instructions and anterior precautions. He has been in physical therapy at our   office. Feels he is progressing nicely. He has finished ASA, and naproxen.     He denies fevers, chills, numbness, tingling, and shortness of breath.    Medical History:  Patient's medications, allergies, past medical, surgical, social and family histories were reviewed and updated as appropriate.    No notes on file    Review of Systems  A 14 point review of systems was completed by the patient and is available in the media section of the scanned medical record and was reviewed on 7/7/2025.      Vital Signs:  There were no vitals filed for this visit.    General/Appearance: Alert and oriented and in no apparent distress.    Skin:  There are no skin lesions, cellulitis, or extreme edema. The patient has warm and well-perfused Bilateral lower  extremities with brisk capillary refill.      Hip  Exam: right    Inspection: Hip incision(s)   are well healed. There is no erythema, drainage or other signs of infection    Palpation:  No crepitus to gentle motion / circumduction of the hip    Active Range of Motion: 0-90    Passive Range of Motion:  0-90deg v limber, can tolerate gentle IR/ER    Strength:  WNL     Special Tests:  Deferred.    Neurovascular: Sensation to light touch is intact, no motor deficits, palpable radial pulses 2+    Radiology:     No new imaging today            Assessment :  Mr. Clemente Reddy is a pleasant 69 y.o. patient who is doing well 4 weeks post right MALINDA, DAA.        Impression:  Encounter Diagnoses   Name Primary?    Status post hip replacement, right Yes    Primary osteoarthritis of right hip        Office Procedures:  No orders of the defined types were placed in this

## 2025-07-08 ENCOUNTER — HOSPITAL ENCOUNTER (OUTPATIENT)
Dept: PHYSICAL THERAPY | Age: 70
Setting detail: THERAPIES SERIES
Discharge: HOME | End: 2025-07-08
Payer: COMMERCIAL

## 2025-07-08 PROCEDURE — 97110 THERAPEUTIC EXERCISES: CPT

## 2025-07-08 PROCEDURE — 97150 GROUP THERAPEUTIC PROCEDURES: CPT

## 2025-07-08 NOTE — FLOWSHEET NOTE
[facility] - Outpatient Rehabilitation and Therapy: [address], [place] [address] office: [phone] fax: [phone]       Physical Therapy: TREATMENT/PROGRESS NOTE   Patient: Clemente Reddy (69 y.o. male)   Examination Date: 2025   :  1955 MRN: [id number]   Visit #: 8   Insurance Allowable Auth Needed   BMN ($40 copay) []Yes    [x]No    Insurance: Payor: MEDIGOLD / Plan: MEDIGOLD / Product Type: *No Product type* /   Insurance ID: [id number] - (Medicare Managed)  Secondary Insurance (if applicable):    Treatment Diagnosis: -    ICD-10-CM    1. Weakness of right lower extremity  R29.898       2. Difficulty walking  R26.2       3. Status post total hip replacement, right  Z96.641          Medical Diagnosis:  Status post hip replacement, right [Z96.641]  Primary osteoarthritis of right hip [M16.11]   Referring Physician: Kervin Cleveland MD  PCP: Shyla Medrano MD     Plan of care signed (Y/N): Y    Date of Patient follow up with Physician: Megha      Plan of Care Report: no  POC update due: (10 visits /OR AUTH LIMITS, whichever is less) - 2025                                             Medical History:  Comorbidities:  Hypertension  Osteoarthritis  Relevant Medical History: PVD, Hx of back surgery                                          Precautions/ Contra-indications:           Latex allergy:  NO  Pacemaker:    NO  Contraindications for Manipulation: None  Date of Surgery:  R MALINDA direct anterior approach   Other:    Red Flags:  None    Suicide Screening:   The patient did not verbalize a primary behavioral concern, suicidal ideation, suicidal intent, or demonstrate suicidal behaviors.    Preferred Language for Healthcare:   [x] English       [] other:    SUBJECTIVE EXAMINATION     Patient stated complaint: Pt reports he is doing well since last visit. Pt saw doc recently and was cleared to swim but still to take it a little easy lifting things. Doc

## 2025-07-10 ENCOUNTER — HOSPITAL ENCOUNTER (OUTPATIENT)
Dept: PHYSICAL THERAPY | Age: 70
Setting detail: THERAPIES SERIES
Discharge: HOME | End: 2025-07-10
Payer: COMMERCIAL

## 2025-07-10 PROCEDURE — 97110 THERAPEUTIC EXERCISES: CPT | Performed by: SPECIALIST/TECHNOLOGIST

## 2025-07-10 PROCEDURE — 97530 THERAPEUTIC ACTIVITIES: CPT | Performed by: SPECIALIST/TECHNOLOGIST

## 2025-07-10 NOTE — FLOWSHEET NOTE
5'                      Hamstring stretching long sitting   IB gastroc stretching   Standing hip flexor stretching off step  30\" 3xea           Leg extension Ecc  Leg curls ECC 25# 3 10xea    Bridges off 4\" step  Bridge with abduction  1 15x                                       SL hip abduction    SL clams  Prone glute extension    lime 2 10x ea    Alternating march with TB in supine Lime AK 2 10x    Leg press B  SL   each leg separately 100#  60# 2 10x    Lateral side step  Monster walk  fwd/ rev blue  2 laps Column to column    Deadlift  to 8\" step 15# 2 10x                  Manual Intervention (40183)  TIME                                        NMR re-education (82361) resistance Sets/time Reps CUES NEEDED    Tandem stance w/ ball reach- trunk flexion aeromat 2-3\"  2 10x TA                                Therapeutic Activity (16741)  Sets/time     Toe taps on box 8 in 2 10B 6/26   FWD step up with airex 8inch + airex  3 10R    Lateral step up 8 inch  3 10R          Goblet squat 15# 3 10x              Modalities:    7/10 declined to perform at home.     Education/Home Exercise Program: Patient HEP program created electronically.  Refer to [employer] access code: Access Code: [email or web address]    Access Code: [email or web address]  URL: [email or web address]  Date: 06/11/2025  Prepared by: Christian Woods    Exercises  - Supine Quadricep Sets  - 2-3 x daily - 7 x weekly - 10 reps - 5 hold  - Supine Gluteal Sets  - 2-3 x daily - 7 x weekly - 10 reps - 5 hold  - Hooklying Isometric Hip Abduction with Belt  - 2-3 x daily - 7 x weekly - 10 reps - 5 hold  - Supine Hip Adduction Isometric with Ball  - 2-3 x daily - 7 x weekly - 10 reps - 5 hold  - Seated Long Arc Quad  - 2-3 x daily - 7 x weekly - 2 sets - 10 reps    6/11/25 Pt was educated on PT POC, Diagnosis, Prognosis, pathomechanics as well as frequency and duration of scheduling future physical therapy appointments. Time was also taken on this day to answer all patient questions

## 2025-07-15 ENCOUNTER — HOSPITAL ENCOUNTER (OUTPATIENT)
Dept: PHYSICAL THERAPY | Age: 70
Setting detail: THERAPIES SERIES
Discharge: HOME | End: 2025-07-15
Payer: COMMERCIAL

## 2025-07-15 PROCEDURE — 97530 THERAPEUTIC ACTIVITIES: CPT

## 2025-07-15 PROCEDURE — 97110 THERAPEUTIC EXERCISES: CPT

## 2025-07-15 NOTE — FLOWSHEET NOTE
[facility] - Outpatient Rehabilitation and Therapy: [address], [place] [address] office: [phone] fax: [phone]       Physical Therapy: TREATMENT/PROGRESS NOTE   Patient: Clemente Reddy (69 y.o. male)   Examination Date: 07/15/2025   :  1955 MRN: [id number]   Visit #: 10   Insurance Allowable Auth Needed   BMN ($40 copay) []Yes    [x]No    Insurance: Payor: MEDIGOLD / Plan: MEDIGOLD / Product Type: *No Product type* /   Insurance ID: [id number] - (Medicare Managed)  Secondary Insurance (if applicable):    Treatment Diagnosis: -    ICD-10-CM    1. Weakness of right lower extremity  R29.898       2. Difficulty walking  R26.2       3. Status post total hip replacement, right  Z96.641          Medical Diagnosis:  Status post hip replacement, right [Z96.641]  Primary osteoarthritis of right hip [M16.11]   Referring Physician: Krevin Cleveland MD  PCP: Shyla Medrano MD     Plan of care signed (Y/N): Y    Date of Patient follow up with Physician: Megha      Plan of Care Report: no  POC update due: (10 visits /OR AUTH LIMITS, whichever is less) - 2025                                             Medical History:  Comorbidities:  Hypertension  Osteoarthritis  Relevant Medical History: PVD, Hx of back surgery                                          Precautions/ Contra-indications:           Latex allergy:  NO  Pacemaker:    NO  Contraindications for Manipulation: None  Date of Surgery:  R MALINDA direct anterior approach   Other:    Red Flags:  None    Suicide Screening:   The patient did not verbalize a primary behavioral concern, suicidal ideation, suicidal intent, or demonstrate suicidal behaviors.    Preferred Language for Healthcare:   [x] English       [] other:    SUBJECTIVE EXAMINATION     Patient stated complaint: Pt states that overall he is doing well. Pt reports that he has a little muscle soreness at the hip flexor but nothing major. Pt reports that he

## 2025-07-17 ENCOUNTER — HOSPITAL ENCOUNTER (OUTPATIENT)
Dept: PHYSICAL THERAPY | Age: 70
Setting detail: THERAPIES SERIES
Discharge: HOME | End: 2025-07-17
Payer: COMMERCIAL

## 2025-07-17 PROCEDURE — 97530 THERAPEUTIC ACTIVITIES: CPT

## 2025-07-17 PROCEDURE — 97110 THERAPEUTIC EXERCISES: CPT

## 2025-07-17 NOTE — DISCHARGE SUMMARY
[facility] - Outpatient Rehabilitation and Therapy: [address], [place] [address] office: [phone] fax: [phone]  Physical Therapy Re-Certification Plan of Care    Dear Kervin Cleveland MD  ,    We had the pleasure of treating the following patient for physical therapy services at [facility]. A summary of our findings can be found in the updated assessment below.  This includes our plan of care.  If you have any questions or concerns regarding these findings, please do not hesitate to contact me at the office phone number checked above.  Thank you for the referral.     Physician Signature:________________________________Date:__________________  By signing above (or electronic signature), therapist's plan is approved by physician      Total Visits: 11     Overall Response to Treatment:  Increased time for reassessment. Pt's strength and disability index significantly improved. Pt believes he is about 90% back to PLOF. Pt ready to be done with therapy as he has no issues outside of PT, feels confident and believes he can manage on his own for the time being. Pt has met all goals and ready for d/c.     Recommendation:    [] Continue PT x / wk for weeks.   [] Hold PT, pending MD visit   [x] Discharge to [facility]. Follow up with PT or MD PRN.         Mvmt (norm) AROM L AROM R PROM L PROM R Notes MMT L MMT R Notes     LUMBAR Flex (90)         Ext (25)         SB(25)            Rotation (30)               HIP Flexion (120) 115 107    4 4     Abduction (45)      5 5     ER (50)            IR (45)            Ext (20)      5 5      KNEE Flex (140) 139 135    5 5     Ext (0)      5 5        ANKLE DF (20)            PF (50)            Inversion (30)            Eversion (20)             Physical Therapy: TREATMENT/PROGRESS NOTE   Patient: Clemente Reddy (69 y.o. male)   Examination Date: 2025   :  1955 MRN: [id number]   Visit #: 11   Insurance

## 2025-08-08 DIAGNOSIS — I73.9 PVD (PERIPHERAL VASCULAR DISEASE): ICD-10-CM

## 2025-08-08 DIAGNOSIS — E78.2 MIXED HYPERLIPIDEMIA: ICD-10-CM

## 2025-08-08 RX ORDER — ATORVASTATIN CALCIUM 20 MG/1
20 TABLET, FILM COATED ORAL DAILY
Qty: 90 TABLET | Refills: 1 | Status: SHIPPED | OUTPATIENT
Start: 2025-08-08

## 2025-08-11 ENCOUNTER — TELEPHONE (OUTPATIENT)
Dept: INTERNAL MEDICINE CLINIC | Age: 70
End: 2025-08-11

## 2025-08-11 DIAGNOSIS — I10 PRIMARY HYPERTENSION: ICD-10-CM

## 2025-08-11 RX ORDER — LOSARTAN POTASSIUM 100 MG/1
100 TABLET ORAL DAILY
Qty: 90 TABLET | Refills: 1 | Status: SHIPPED | OUTPATIENT
Start: 2025-08-11

## 2025-08-28 ENCOUNTER — OFFICE VISIT (OUTPATIENT)
Dept: INTERNAL MEDICINE CLINIC | Age: 70
End: 2025-08-28
Payer: COMMERCIAL

## 2025-08-28 VITALS
WEIGHT: 169.8 LBS | DIASTOLIC BLOOD PRESSURE: 75 MMHG | BODY MASS INDEX: 23.68 KG/M2 | HEART RATE: 85 BPM | OXYGEN SATURATION: 98 % | SYSTOLIC BLOOD PRESSURE: 165 MMHG

## 2025-08-28 DIAGNOSIS — F10.90 HEAVY ALCOHOL CONSUMPTION: ICD-10-CM

## 2025-08-28 DIAGNOSIS — E78.2 MIXED HYPERLIPIDEMIA: ICD-10-CM

## 2025-08-28 DIAGNOSIS — E53.8 B12 DEFICIENCY: ICD-10-CM

## 2025-08-28 DIAGNOSIS — D50.9 IRON DEFICIENCY ANEMIA, UNSPECIFIED IRON DEFICIENCY ANEMIA TYPE: ICD-10-CM

## 2025-08-28 DIAGNOSIS — I10 PRIMARY HYPERTENSION: Primary | ICD-10-CM

## 2025-08-28 LAB
DEPRECATED RDW RBC AUTO: 14 % (ref 12.4–15.4)
FERRITIN SERPL IA-MCNC: 370 NG/ML (ref 30–400)
FOLATE SERPL-MCNC: 15.4 NG/ML (ref 4.78–24.2)
HCT VFR BLD AUTO: 37.6 % (ref 40.5–52.5)
HGB BLD-MCNC: 12.3 G/DL (ref 13.5–17.5)
IRON SATN MFR SERPL: 30 % (ref 20–50)
IRON SERPL-MCNC: 96 UG/DL (ref 59–158)
MCH RBC QN AUTO: 32.1 PG (ref 26–34)
MCHC RBC AUTO-ENTMCNC: 32.6 G/DL (ref 31–36)
MCV RBC AUTO: 98.4 FL (ref 80–100)
PLATELET # BLD AUTO: 262 K/UL (ref 135–450)
PMV BLD AUTO: 10.4 FL (ref 5–10.5)
RBC # BLD AUTO: 3.82 M/UL (ref 4.2–5.9)
TIBC SERPL-MCNC: 323 UG/DL (ref 260–445)
VIT B12 SERPL-MCNC: 747 PG/ML (ref 211–911)
WBC # BLD AUTO: 7.1 K/UL (ref 4–11)

## 2025-08-28 PROCEDURE — 99214 OFFICE O/P EST MOD 30 MIN: CPT | Performed by: INTERNAL MEDICINE

## 2025-08-28 PROCEDURE — 3077F SYST BP >= 140 MM HG: CPT | Performed by: INTERNAL MEDICINE

## 2025-08-28 PROCEDURE — 3078F DIAST BP <80 MM HG: CPT | Performed by: INTERNAL MEDICINE

## 2025-08-28 PROCEDURE — 1123F ACP DISCUSS/DSCN MKR DOCD: CPT | Performed by: INTERNAL MEDICINE

## 2025-08-28 PROCEDURE — 1159F MED LIST DOCD IN RCRD: CPT | Performed by: INTERNAL MEDICINE

## 2025-08-28 PROCEDURE — 1160F RVW MEDS BY RX/DR IN RCRD: CPT | Performed by: INTERNAL MEDICINE

## 2025-08-28 RX ORDER — METOPROLOL SUCCINATE 25 MG/1
25 TABLET, EXTENDED RELEASE ORAL DAILY
Qty: 90 TABLET | Refills: 1 | Status: SHIPPED | OUTPATIENT
Start: 2025-08-28

## 2025-08-28 ASSESSMENT — ENCOUNTER SYMPTOMS
SHORTNESS OF BREATH: 0
SORE THROAT: 0
COUGH: 0
CONSTIPATION: 0
VOMITING: 0
NAUSEA: 0
BACK PAIN: 0
ABDOMINAL PAIN: 0
WHEEZING: 0
CHEST TIGHTNESS: 0
COLOR CHANGE: 0

## (undated) DEVICE — GLOVE SURG SZ 55 THK91MIL ORANGE  LTX FREE SYN POLYISOPRENE

## (undated) DEVICE — SOLUTION IV 250ML 0.9% SOD CHL FOR EXTRACELLULAR FLD REPL N L8002] B BRAUN MEDICAL INC]

## (undated) DEVICE — SUTURE MCRYL + SZ 4-0 L27IN ABSRB UD L19MM PS-2 3/8 CIR MCP426H

## (undated) DEVICE — 450 ML BOTTLE OF 0.05% CHLORHEXIDINE GLUCONATE IN 99.95% STERILE WATER FOR IRRIGATION, USP AND APPLICATOR.: Brand: IRRISEPT ANTIMICROBIAL WOUND LAVAGE

## (undated) DEVICE — Device: Brand: COVER, PERINEAL POST, 12 PK

## (undated) DEVICE — STRIP,CLOSURE,WOUND,MEDI-STRIP,1/2X4: Brand: MEDLINE

## (undated) DEVICE — SUTURE VICRYL SZ 2-0 L18IN ABSRB UD CT-1 L36MM 1/2 CIR J839D

## (undated) DEVICE — NEEDLE HYPO 22GA L1.5IN BLK POLYPR HUB S STL REG BVL STR

## (undated) DEVICE — CORD ES L10FT MPLR LAP

## (undated) DEVICE — 3D ISLAND DRESSING 4IN X 8IN: Brand: THERABOND 3D ANTIMICROBIAL BARRIER SYSTEMS

## (undated) DEVICE — SUTURE ETHIBOND EXCEL SZ 2 L30IN NONABSORBABLE GRN L40MM V-37 MX69G

## (undated) DEVICE — BLADE,CARBON-STEEL,10,STRL,DISPOSABLE,TB: Brand: MEDLINE

## (undated) DEVICE — Device

## (undated) DEVICE — GOWN SIRUS NONREIN XL W/TWL: Brand: MEDLINE INDUSTRIES, INC.

## (undated) DEVICE — LAPAROSCOPY PACK: Brand: MEDLINE INDUSTRIES, INC.

## (undated) DEVICE — SHEET,DRAPE,40X58,STERILE: Brand: MEDLINE

## (undated) DEVICE — GOWN,SURGICAL,AURORA,SLEEVE: Brand: MEDLINE

## (undated) DEVICE — VALVE SUCTION AIR H2O SET ORCA POD + DISP

## (undated) DEVICE — RELOAD STPL H1.8-3.8MM REG THCK TISS G 6 ROW GRIPPING SURF

## (undated) DEVICE — SHEET,DRAPE,53X77,STERILE: Brand: MEDLINE

## (undated) DEVICE — TOWEL,STOP FLAG GOLD N-W: Brand: MEDLINE

## (undated) DEVICE — PIN BNE FIX TEMP L140MM DIA4MM MAKO

## (undated) DEVICE — COVER LT HNDL BLU PLAS

## (undated) DEVICE — APPLICATOR MEDICATED 26 CC SOLUTION HI LT ORNG CHLORAPREP

## (undated) DEVICE — ADHESIVE SKIN CLOSURE WND 8.661X1.5 IN 22 CM LIQUIBAND SECUR

## (undated) DEVICE — BLADE,CARBON-STEEL,11,STRL,DISPOSABLE,TB: Brand: MEDLINE

## (undated) DEVICE — COTTON UNDERCAST PADDING,CRIMPED FINISH: Brand: WEBRIL

## (undated) DEVICE — TRAP FLUID

## (undated) DEVICE — 3M™ TEGADERM™ TRANSPARENT FILM DRESSING FRAME STYLE, 1626W, 4 IN X 4-3/4 IN (10 CM X 12 CM), 50/CT 4CT/CASE: Brand: 3M™ TEGADERM™

## (undated) DEVICE — 6619 2 PTNT ISO SYS INCISE AREA&LT;(&GT;&&LT;)&GT;P: Brand: STERI-DRAPE™ IOBAN™ 2

## (undated) DEVICE — UNDERPANTS INCONT XL 45-70IN KNIT SEAMLESS DSGN COLOR-CODED

## (undated) DEVICE — 3M™ IOBAN™ 2 ANTIMICROBIAL INCISE DRAPE 6640EZ: Brand: IOBAN™ 2

## (undated) DEVICE — TROCAR: Brand: KII FIOS FIRST ENTRY

## (undated) DEVICE — BAG RETRIEVAL SPECIMEN SUPERBAG 10 MEDIUM NYLON ITRODUCER

## (undated) DEVICE — 3M™ IOBAN™ 2 ANTIMICROBIAL INCISE DRAPE 6650EZ: Brand: IOBAN™ 2

## (undated) DEVICE — KIT INT FIX FEM TIB CKPT MAKOPLASTY

## (undated) DEVICE — SEALER VESSEL LIGASURE MARYLAND 37CM

## (undated) DEVICE — GOWN SIRUS NONREIN LG W/TWL: Brand: MEDLINE INDUSTRIES, INC.

## (undated) DEVICE — KIT DRP FOR RIO ROBOTIC ARM ASST SYS

## (undated) DEVICE — SHEET, ORTHO, SPLIT, STERILE: Brand: MEDLINE

## (undated) DEVICE — ENDOSCOPIC KIT 6X3/16 FT COLON W/ 1.1 OZ 2 GWN W/O BRSH

## (undated) DEVICE — SYRINGE MED 30ML STD CLR PLAS LUERLOCK TIP N CTRL DISP

## (undated) DEVICE — SPONGE GZ W4XL8IN COT WVN 12 PLY

## (undated) DEVICE — AIR/WATER CLEANING ADAPTER FOR OLYMPUS® GI ENDOSCOPE: Brand: BULLDOG®

## (undated) DEVICE — SOLUTION IV IRRIG WATER 500ML POUR BRL ST 2F7113

## (undated) DEVICE — ANTERIOR TOTAL HIP: Brand: MEDLINE INDUSTRIES, INC.

## (undated) DEVICE — GOWN,SIRUS,POLYRNF,BRTHSLV,XL,30/CS: Brand: MEDLINE

## (undated) DEVICE — TROCAR SLEEVE: Brand: KII ® LOW PROFILE SLEEVE

## (undated) DEVICE — SUTURE ETHIBOND EXCEL SZ 5 L30IN NONABSORBABLE GRN L40MM V-37 MB66G

## (undated) DEVICE — DUAL CUT SAGITTAL BLADE

## (undated) DEVICE — WAX SURG 2.5GM HEMSTAT BNE BEESWAX PARAFFIN ISO PALMITATE

## (undated) DEVICE — ADHESIVE SKIN CLOSURE TOP 36 CC HI VISC DERMBND MINI

## (undated) DEVICE — SUTURE MONOCRYL STRATAFIX SPRL + SZ 3-0 L18IN ABSRB UD PS-2 SXMP1B107

## (undated) DEVICE — STAPLER INT L34CM 60MM LNG ENDOSCP ARTC PWR + ECHELON FLX

## (undated) DEVICE — HYPODERMIC SAFETY NEEDLE: Brand: MAGELLAN

## (undated) DEVICE — SUTURE VCRL + SZ 0 L27IN ABSRB VLT L26MM UR-6 5/8 CIR VCP603H

## (undated) DEVICE — LAPAROSCOPIC ACCESS SYSTEM: Brand: ALEXIS LAPAROSCOPIC SYSTEM WITH KII FIOS FIRST ENTRY

## (undated) DEVICE — HOLDER SCALP PLAS G STD

## (undated) DEVICE — BIPOLAR SEALER 23-112-1 AQM 6.0: Brand: AQUAMANTYS ®

## (undated) DEVICE — GLOVE SURG SZ 6 L12IN FNGR THK79MIL GRN LTX FREE

## (undated) DEVICE — DRAPE,UTILTY,TAPE,15X26, 4EA/PK: Brand: MEDLINE

## (undated) DEVICE — SOLUTION IRRIG 3000ML 0.9% SOD CHL ARTHROMATIC PLAS CONT

## (undated) DEVICE — GLOVE SURG SZ 6.5 L11.2IN FNGR THK9.8MIL STRW LTX POLYMER

## (undated) DEVICE — SUTURE ABSORBABLE MONOFILAMENT 1 CTX 36 CM 48 MM VIO PDS +

## (undated) DEVICE — DRAPE,LAP,CHOLE,W/TROUGHS,STERILE: Brand: MEDLINE

## (undated) DEVICE — CUTTER ENDOSCP L340MM LIN ARTC SGL STROKE FIRING ENDOPATH

## (undated) DEVICE — GLOVE ORTHO 7   MSG9470

## (undated) DEVICE — SYRINGE MED 10ML TRNSLUC BRL PLUNG BLK MRK POLYPR CTRL

## (undated) DEVICE — MARKER SURG SKIN UTIL BLK REG TIP NONSMEARING W/ 6IN RUL

## (undated) DEVICE — BW-412T DISP COMBO CLEANING BRUSH: Brand: SINGLE USE COMBINATION CLEANING BRUSH

## (undated) DEVICE — KIT TRK HIP PROC VIZADISC

## (undated) DEVICE — LAPAROSCOPIC TROCAR SLEEVE/SINGLE USE: Brand: KII® LOW PROFILE OPTICAL ACCESS SYSTEM

## (undated) DEVICE — MERCY FAIRFIELD TURNOVER KIT: Brand: MEDLINE INDUSTRIES, INC.

## (undated) DEVICE — 3M™ STERI-DRAPE™ U-DRAPE 1015: Brand: STERI-DRAPE™

## (undated) DEVICE — SOLUTION IV 1000ML 0.9% SOD CHL

## (undated) DEVICE — GLOVE SURG SZ 7 L12IN FNGR THK79MIL GRN LTX FREE

## (undated) DEVICE — SOLUTION IRRIG 1000ML H2O PIC PLAS SHATTERPROOF CONTAINER

## (undated) DEVICE — SUTURE PERMA-HAND SZ 2-0 L30IN NONABSORBABLE BLK L26MM SH K833H